# Patient Record
Sex: MALE | Race: WHITE | NOT HISPANIC OR LATINO
[De-identification: names, ages, dates, MRNs, and addresses within clinical notes are randomized per-mention and may not be internally consistent; named-entity substitution may affect disease eponyms.]

---

## 2017-07-06 ENCOUNTER — APPOINTMENT (OUTPATIENT)
Dept: DERMATOLOGY | Facility: CLINIC | Age: 72
End: 2017-07-06

## 2018-04-13 ENCOUNTER — APPOINTMENT (OUTPATIENT)
Dept: ORTHOPEDIC SURGERY | Facility: CLINIC | Age: 73
End: 2018-04-13
Payer: MEDICARE

## 2018-04-13 VITALS — BODY MASS INDEX: 34.07 KG/M2 | HEIGHT: 69 IN | WEIGHT: 230 LBS

## 2018-04-13 DIAGNOSIS — M75.102 UNSPECIFIED ROTATOR CUFF TEAR OR RUPTURE OF LEFT SHOULDER, NOT SPECIFIED AS TRAUMATIC: ICD-10-CM

## 2018-04-13 DIAGNOSIS — M75.101 UNSPECIFIED ROTATOR CUFF TEAR OR RUPTURE OF RIGHT SHOULDER, NOT SPECIFIED AS TRAUMATIC: ICD-10-CM

## 2018-04-13 DIAGNOSIS — M25.511 PAIN IN RIGHT SHOULDER: ICD-10-CM

## 2018-04-13 DIAGNOSIS — S16.1XXS STRAIN OF MUSCLE, FASCIA AND TENDON AT NECK LEVEL, SEQUELA: ICD-10-CM

## 2018-04-13 DIAGNOSIS — M25.512 PAIN IN RIGHT SHOULDER: ICD-10-CM

## 2018-04-13 PROCEDURE — 99202 OFFICE O/P NEW SF 15 MIN: CPT

## 2018-04-16 ENCOUNTER — MOBILE ON CALL (OUTPATIENT)
Age: 73
End: 2018-04-16

## 2018-04-16 PROBLEM — M75.101 RIGHT ROTATOR CUFF TEAR: Status: ACTIVE | Noted: 2018-04-16

## 2018-04-16 PROBLEM — M75.102 LEFT ROTATOR CUFF TEAR: Status: ACTIVE | Noted: 2018-04-16

## 2018-04-16 PROBLEM — S16.1XXS STRAIN OF NECK MUSCLE, SEQUELA: Status: ACTIVE | Noted: 2018-04-16

## 2018-04-16 PROBLEM — M25.511 SHOULDER PAIN, BILATERAL: Status: ACTIVE | Noted: 2018-04-16

## 2018-04-19 ENCOUNTER — APPOINTMENT (OUTPATIENT)
Dept: PHYSICAL MEDICINE AND REHAB | Facility: CLINIC | Age: 73
End: 2018-04-19
Payer: MEDICARE

## 2018-04-19 VITALS — HEART RATE: 74 BPM | HEIGHT: 69 IN | BODY MASS INDEX: 34.07 KG/M2 | WEIGHT: 230 LBS

## 2018-04-19 DIAGNOSIS — M54.2 CERVICALGIA: ICD-10-CM

## 2018-04-19 DIAGNOSIS — Z78.9 OTHER SPECIFIED HEALTH STATUS: ICD-10-CM

## 2018-04-19 PROCEDURE — 20552 NJX 1/MLT TRIGGER POINT 1/2: CPT

## 2018-04-19 PROCEDURE — 99204 OFFICE O/P NEW MOD 45 MIN: CPT | Mod: 25

## 2018-04-20 PROBLEM — Z78.9 CONSUMES ALCOHOL OCCASIONALLY: Status: ACTIVE | Noted: 2018-04-19

## 2018-05-25 ENCOUNTER — INPATIENT (INPATIENT)
Facility: HOSPITAL | Age: 73
LOS: 6 days | Discharge: OTHER INSTITUTION | DRG: 287 | End: 2018-06-01
Attending: HOSPITALIST | Admitting: HOSPITALIST
Payer: MEDICARE

## 2018-05-25 VITALS
TEMPERATURE: 98 F | OXYGEN SATURATION: 92 % | DIASTOLIC BLOOD PRESSURE: 61 MMHG | SYSTOLIC BLOOD PRESSURE: 119 MMHG | HEIGHT: 69 IN | WEIGHT: 229.94 LBS | HEART RATE: 71 BPM | RESPIRATION RATE: 18 BRPM

## 2018-05-25 DIAGNOSIS — M50.20 OTHER CERVICAL DISC DISPLACEMENT, UNSPECIFIED CERVICAL REGION: Chronic | ICD-10-CM

## 2018-05-25 PROCEDURE — 93010 ELECTROCARDIOGRAM REPORT: CPT

## 2018-05-25 PROCEDURE — 99285 EMERGENCY DEPT VISIT HI MDM: CPT

## 2018-05-25 NOTE — ED ADULT NURSE NOTE - OBJECTIVE STATEMENT
Patient presents to ER for medical evaluation, patient reports having colonoscopy this AM, was sent home and during evening hours wife states patient had an episode where patient was unable to speak and not responding to questions, patient currently A&O X3, resp even/unlabored, lungs CTAB.

## 2018-05-25 NOTE — ED PROVIDER NOTE - PMH
Diverticular disease    Hard of hearing, bilateral  wears bilateral hearing aids. not with patient  Hemorrhoids    HTN (hypertension)

## 2018-05-25 NOTE — ED PROVIDER NOTE - OBJECTIVE STATEMENT
74 y/o M with PMHx of HTN, gout and diverticular disease presents to ED c/o syncopal episode onset today while sitting in a chair at home. As per wife, the episode lasted a few minutes, his eyes appeared to roll into the back of is head and she was unable to wake him so she activated EMS. When the patient woke up, he was not aware of what happened and was extremely diaphoretic. Patient had colonoscopy earlier today and went home without any complications. Denies fevers, chills, head trauma, chest pain, difficulty breathing, abdominal pain, nausea, vomiting, headaches or previous cardiac history. No further acute complaints at this time.     Internist: Dr. Pal (who performed colonoscopy earlier today)

## 2018-05-26 DIAGNOSIS — R55 SYNCOPE AND COLLAPSE: ICD-10-CM

## 2018-05-26 DIAGNOSIS — M10.9 GOUT, UNSPECIFIED: ICD-10-CM

## 2018-05-26 DIAGNOSIS — I10 ESSENTIAL (PRIMARY) HYPERTENSION: ICD-10-CM

## 2018-05-26 DIAGNOSIS — Z29.9 ENCOUNTER FOR PROPHYLACTIC MEASURES, UNSPECIFIED: ICD-10-CM

## 2018-05-26 LAB
ALBUMIN SERPL ELPH-MCNC: 3.9 G/DL — SIGNIFICANT CHANGE UP (ref 3.3–5.2)
ALP SERPL-CCNC: 62 U/L — SIGNIFICANT CHANGE UP (ref 40–120)
ALT FLD-CCNC: 19 U/L — SIGNIFICANT CHANGE UP
ANION GAP SERPL CALC-SCNC: 18 MMOL/L — HIGH (ref 5–17)
APTT BLD: 27.8 SEC — SIGNIFICANT CHANGE UP (ref 27.5–37.4)
AST SERPL-CCNC: 24 U/L — SIGNIFICANT CHANGE UP
BASOPHILS # BLD AUTO: 0 K/UL — SIGNIFICANT CHANGE UP (ref 0–0.2)
BASOPHILS NFR BLD AUTO: 0.1 % — SIGNIFICANT CHANGE UP (ref 0–2)
BILIRUB SERPL-MCNC: 0.5 MG/DL — SIGNIFICANT CHANGE UP (ref 0.4–2)
BLD GP AB SCN SERPL QL: SIGNIFICANT CHANGE UP
BUN SERPL-MCNC: 22 MG/DL — HIGH (ref 8–20)
CALCIUM SERPL-MCNC: 9.1 MG/DL — SIGNIFICANT CHANGE UP (ref 8.6–10.2)
CHLORIDE SERPL-SCNC: 99 MMOL/L — SIGNIFICANT CHANGE UP (ref 98–107)
CK MB CFR SERPL CALC: 3.2 NG/ML — SIGNIFICANT CHANGE UP (ref 0–6.7)
CK MB CFR SERPL CALC: 3.5 NG/ML — SIGNIFICANT CHANGE UP (ref 0–6.7)
CK SERPL-CCNC: 221 U/L — HIGH (ref 30–200)
CK SERPL-CCNC: 233 U/L — HIGH (ref 30–200)
CO2 SERPL-SCNC: 23 MMOL/L — SIGNIFICANT CHANGE UP (ref 22–29)
CREAT SERPL-MCNC: 1.17 MG/DL — SIGNIFICANT CHANGE UP (ref 0.5–1.3)
EOSINOPHIL # BLD AUTO: 0 K/UL — SIGNIFICANT CHANGE UP (ref 0–0.5)
EOSINOPHIL NFR BLD AUTO: 0.1 % — SIGNIFICANT CHANGE UP (ref 0–5)
GLUCOSE SERPL-MCNC: 139 MG/DL — HIGH (ref 70–115)
HCT VFR BLD CALC: 46.6 % — SIGNIFICANT CHANGE UP (ref 42–52)
HGB BLD-MCNC: 15.4 G/DL — SIGNIFICANT CHANGE UP (ref 14–18)
INR BLD: 1.06 RATIO — SIGNIFICANT CHANGE UP (ref 0.88–1.16)
LACTATE BLDV-MCNC: 1.9 MMOL/L — SIGNIFICANT CHANGE UP (ref 0.5–2)
LYMPHOCYTES # BLD AUTO: 0.9 K/UL — LOW (ref 1–4.8)
LYMPHOCYTES # BLD AUTO: 10.3 % — LOW (ref 20–55)
MCHC RBC-ENTMCNC: 33 G/DL — SIGNIFICANT CHANGE UP (ref 32–36)
MCHC RBC-ENTMCNC: 33 PG — HIGH (ref 27–31)
MCV RBC AUTO: 99.8 FL — HIGH (ref 80–94)
MONOCYTES # BLD AUTO: 0.4 K/UL — SIGNIFICANT CHANGE UP (ref 0–0.8)
MONOCYTES NFR BLD AUTO: 4.6 % — SIGNIFICANT CHANGE UP (ref 3–10)
NEUTROPHILS # BLD AUTO: 7.7 K/UL — SIGNIFICANT CHANGE UP (ref 1.8–8)
NEUTROPHILS NFR BLD AUTO: 84.7 % — HIGH (ref 37–73)
PLATELET # BLD AUTO: 243 K/UL — SIGNIFICANT CHANGE UP (ref 150–400)
POTASSIUM SERPL-MCNC: 3.9 MMOL/L — SIGNIFICANT CHANGE UP (ref 3.5–5.3)
POTASSIUM SERPL-SCNC: 3.9 MMOL/L — SIGNIFICANT CHANGE UP (ref 3.5–5.3)
PROT SERPL-MCNC: 6.7 G/DL — SIGNIFICANT CHANGE UP (ref 6.6–8.7)
PROTHROM AB SERPL-ACNC: 11.7 SEC — SIGNIFICANT CHANGE UP (ref 9.8–12.7)
RBC # BLD: 4.67 M/UL — SIGNIFICANT CHANGE UP (ref 4.6–6.2)
RBC # FLD: 14.9 % — SIGNIFICANT CHANGE UP (ref 11–15.6)
SODIUM SERPL-SCNC: 140 MMOL/L — SIGNIFICANT CHANGE UP (ref 135–145)
TROPONIN T SERPL-MCNC: <0.01 NG/ML — SIGNIFICANT CHANGE UP (ref 0–0.06)
TROPONIN T SERPL-MCNC: <0.01 NG/ML — SIGNIFICANT CHANGE UP (ref 0–0.06)
TYPE + AB SCN PNL BLD: SIGNIFICANT CHANGE UP
WBC # BLD: 9.1 K/UL — SIGNIFICANT CHANGE UP (ref 4.8–10.8)
WBC # FLD AUTO: 9.1 K/UL — SIGNIFICANT CHANGE UP (ref 4.8–10.8)

## 2018-05-26 PROCEDURE — 71045 X-RAY EXAM CHEST 1 VIEW: CPT | Mod: 26

## 2018-05-26 PROCEDURE — 74177 CT ABD & PELVIS W/CONTRAST: CPT | Mod: 26

## 2018-05-26 PROCEDURE — 93010 ELECTROCARDIOGRAM REPORT: CPT

## 2018-05-26 PROCEDURE — 99223 1ST HOSP IP/OBS HIGH 75: CPT

## 2018-05-26 PROCEDURE — 99223 1ST HOSP IP/OBS HIGH 75: CPT | Mod: AI

## 2018-05-26 RX ORDER — ALLOPURINOL 300 MG
300 TABLET ORAL DAILY
Qty: 0 | Refills: 0 | Status: DISCONTINUED | OUTPATIENT
Start: 2018-05-26 | End: 2018-06-01

## 2018-05-26 RX ORDER — SODIUM CHLORIDE 9 MG/ML
1000 INJECTION, SOLUTION INTRAVENOUS
Qty: 0 | Refills: 0 | Status: DISCONTINUED | OUTPATIENT
Start: 2018-05-26 | End: 2018-05-26

## 2018-05-26 RX ORDER — VALSARTAN 80 MG/1
80 TABLET ORAL DAILY
Qty: 0 | Refills: 0 | Status: DISCONTINUED | OUTPATIENT
Start: 2018-05-26 | End: 2018-05-29

## 2018-05-26 RX ADMIN — Medication 300 MILLIGRAM(S): at 12:08

## 2018-05-26 RX ADMIN — VALSARTAN 80 MILLIGRAM(S): 80 TABLET ORAL at 12:08

## 2018-05-26 RX ADMIN — SODIUM CHLORIDE 100 MILLILITER(S): 9 INJECTION, SOLUTION INTRAVENOUS at 12:08

## 2018-05-26 NOTE — H&P ADULT - HISTORY OF PRESENT ILLNESS
72 yo M with h/o HTN, BPH presents from home with acute onset syncope. Pt reports that he had a colonoscopy on the morning of 5/25 which was uneventful. He returned home and was watching TV when his wife saw that he had slumped backwards in his chair, was unresponsive, and eyes rolled backwards. Wife immediately called EMS, and pt woke up shortly afterwards. Pt denies any palpitations, chest pain, shortness of breath, dizziness, vision changes. Reports that he did not remember the incident at all, and only remembers when he woke up and found himself diaphoretic.    In the ED, CT abdomen/pelvis was negative. EKG showed sinus bradycardia. Troponin neg x 1.

## 2018-05-26 NOTE — H&P ADULT - NEGATIVE NEUROLOGICAL SYMPTOMS
no vertigo/no headache/no confusion/no loss of consciousness/no difficulty walking/no weakness/no generalized seizures/no tremors/no hemiparesis/no facial palsy

## 2018-05-26 NOTE — H&P ADULT - NEUROLOGICAL DETAILS
sensation intact/cranial nerves intact/normal strength/responds to pain/responds to verbal commands/alert and oriented x 3

## 2018-05-26 NOTE — H&P ADULT - RS GEN PE MLT RESP DETAILS PC
good air movement/clear to auscultation bilaterally/no intercostal retractions/respirations non-labored/no chest wall tenderness/no rales

## 2018-05-26 NOTE — PROGRESS NOTE ADULT - SUBJECTIVE AND OBJECTIVE BOX
Pt seen and examined.   Full consult dictated.   One episode of syncope  EKG and tele with frequent PVCs  TTE and CT head  carotid Duplex  Plan to have a stress test.

## 2018-05-26 NOTE — H&P ADULT - PROBLEM SELECTOR PLAN 1
?Cardiogenic etiology  EKG showed sinus samaria  CT head pending  Trend cardiac enzymes  TTE pending  Had recent colonscopy, ? dehydration. No orthostatics done in ED, will start LR @100cc/hr  Cardio consult pending

## 2018-05-27 LAB
ANION GAP SERPL CALC-SCNC: 12 MMOL/L — SIGNIFICANT CHANGE UP (ref 5–17)
BUN SERPL-MCNC: 19 MG/DL — SIGNIFICANT CHANGE UP (ref 8–20)
CALCIUM SERPL-MCNC: 8.9 MG/DL — SIGNIFICANT CHANGE UP (ref 8.6–10.2)
CHLORIDE SERPL-SCNC: 103 MMOL/L — SIGNIFICANT CHANGE UP (ref 98–107)
CO2 SERPL-SCNC: 27 MMOL/L — SIGNIFICANT CHANGE UP (ref 22–29)
CREAT SERPL-MCNC: 1.17 MG/DL — SIGNIFICANT CHANGE UP (ref 0.5–1.3)
GLUCOSE SERPL-MCNC: 91 MG/DL — SIGNIFICANT CHANGE UP (ref 70–115)
HCT VFR BLD CALC: 44.8 % — SIGNIFICANT CHANGE UP (ref 42–52)
HGB BLD-MCNC: 14.5 G/DL — SIGNIFICANT CHANGE UP (ref 14–18)
MAGNESIUM SERPL-MCNC: 2.1 MG/DL — SIGNIFICANT CHANGE UP (ref 1.6–2.6)
MCHC RBC-ENTMCNC: 32.4 G/DL — SIGNIFICANT CHANGE UP (ref 32–36)
MCHC RBC-ENTMCNC: 32.4 PG — HIGH (ref 27–31)
MCV RBC AUTO: 100.2 FL — HIGH (ref 80–94)
PLATELET # BLD AUTO: 220 K/UL — SIGNIFICANT CHANGE UP (ref 150–400)
POTASSIUM SERPL-MCNC: 4.2 MMOL/L — SIGNIFICANT CHANGE UP (ref 3.5–5.3)
POTASSIUM SERPL-SCNC: 4.2 MMOL/L — SIGNIFICANT CHANGE UP (ref 3.5–5.3)
RBC # BLD: 4.47 M/UL — LOW (ref 4.6–6.2)
RBC # FLD: 14.7 % — SIGNIFICANT CHANGE UP (ref 11–15.6)
SODIUM SERPL-SCNC: 142 MMOL/L — SIGNIFICANT CHANGE UP (ref 135–145)
WBC # BLD: 5.9 K/UL — SIGNIFICANT CHANGE UP (ref 4.8–10.8)
WBC # FLD AUTO: 5.9 K/UL — SIGNIFICANT CHANGE UP (ref 4.8–10.8)

## 2018-05-27 PROCEDURE — 93970 EXTREMITY STUDY: CPT | Mod: 26

## 2018-05-27 PROCEDURE — 99233 SBSQ HOSP IP/OBS HIGH 50: CPT

## 2018-05-27 PROCEDURE — 70450 CT HEAD/BRAIN W/O DYE: CPT | Mod: 26

## 2018-05-27 PROCEDURE — 93306 TTE W/DOPPLER COMPLETE: CPT | Mod: 26

## 2018-05-27 PROCEDURE — 93880 EXTRACRANIAL BILAT STUDY: CPT | Mod: 26

## 2018-05-27 PROCEDURE — 93010 ELECTROCARDIOGRAM REPORT: CPT

## 2018-05-27 PROCEDURE — 99232 SBSQ HOSP IP/OBS MODERATE 35: CPT

## 2018-05-27 RX ORDER — ENOXAPARIN SODIUM 100 MG/ML
40 INJECTION SUBCUTANEOUS DAILY
Qty: 0 | Refills: 0 | Status: DISCONTINUED | OUTPATIENT
Start: 2018-05-27 | End: 2018-05-28

## 2018-05-27 RX ORDER — ATENOLOL 25 MG/1
12.5 TABLET ORAL DAILY
Qty: 0 | Refills: 0 | Status: DISCONTINUED | OUTPATIENT
Start: 2018-05-27 | End: 2018-06-01

## 2018-05-27 RX ADMIN — ENOXAPARIN SODIUM 40 MILLIGRAM(S): 100 INJECTION SUBCUTANEOUS at 21:44

## 2018-05-27 RX ADMIN — VALSARTAN 80 MILLIGRAM(S): 80 TABLET ORAL at 05:12

## 2018-05-27 RX ADMIN — ATENOLOL 12.5 MILLIGRAM(S): 25 TABLET ORAL at 17:31

## 2018-05-27 RX ADMIN — Medication 300 MILLIGRAM(S): at 11:56

## 2018-05-27 NOTE — PROGRESS NOTE ADULT - SUBJECTIVE AND OBJECTIVE BOX
CHIEF COMPLAINT:Patient is a 73y old  Male who presents with a chief complaint of Syncope (26 May 2018 09:05)  pt is walking today. no more syncope or presyncope.  PVCs on monitor.   no n/v/d    Allergies  No Known Allergies    MEDICATIONS:  valsartan 80 milliGRAM(s) Oral daily  allopurinol 300 milliGRAM(s) Oral daily    PHYSICAL EXAM:  T(C): 36.1 (05-27-18 @ 05:10), Max: 36.7 (05-26-18 @ 21:00)  HR: 74 (05-27-18 @ 13:06) (68 - 77)  BP: 118/87 (05-27-18 @ 13:06) (98/70 - 125/75)  RR: 18 (05-27-18 @ 13:06) (18 - 20)  SpO2: 97% (05-27-18 @ 10:10) (95% - 97%)  Wt(kg): --    I&O's Summary    26 May 2018 07:01  -  27 May 2018 07:00  --------------------------------------------------------  IN: 120 mL / OUT: 0 mL / NET: 120 mL    Appearance: Normal	  HEENT:   NC/AT  Eye: Pink Conjunctiva  Lungs: CTA B/L  CVS: RRR, Normal S1 and S2, No Edema  Pulses: Normal distal pulses.  Neuro: A&O x3      LABS:		    CARDIAC MARKERS:                            14.5   5.9   )-----------( 220      ( 27 May 2018 05:35 )             44.8     05-27    142  |  103  |  19.0  ----------------------------<  91  4.2   |  27.0  |  1.17    Ca    8.9      27 May 2018 05:35  Mg     2.1     05-27    TPro  6.7  /  Alb  3.9  /  TBili  0.5  /  DBili  x   /  AST  24  /  ALT  19  /  AlkPhos  62  05-26    TTE:  Summary:   1. Technically difficult study.   2. Endocardial visualization was enhanced with intravenous echo contrast.   3. There is moderate concentric left ventricular hypertrophy.   4. Normal global left ventricular systolic function.   5. Left ventricular ejection fraction, by visual estimation, is 60 to   65%.   6. Normal left ventricular internal cavity size.   7. Spectral Doppler shows impaired relaxation pattern of left   ventricular myocardial filling (Grade I diastolic dysfunction).   8. There is no evidence of pericardial effusion.   9. Thickening of the anterior mitral valve leaflet.  10. Dilatation of the aortic root.  11. The aortic valve mean gradient is 3.0 mmHg consistent with normally   opening aortic valve.  12. Trace tricuspid regurgitation.    [id number] Margarito Harper MD, Electronically signed on 5/27/2018 at 2:53:37 PM       ASSESSMENT/PLAN:

## 2018-05-27 NOTE — PROGRESS NOTE ADULT - SUBJECTIVE AND OBJECTIVE BOX
DONTA CRABTREE    [id number]    73y      Male    INTERVAL HPI/OVERNIGHT EVENTS: Multiple PVCs on monitor. Offers no complaints.    Hospital course:  74 yo M with h/o HTN, BPH presents from home with acute onset syncope. Pt reports that he had a colonoscopy on the morning of 5/25 which was uneventful. He returned home and was watching TV when his wife saw that he had slumped backwards in his chair, was unresponsive, and eyes rolled backwards. Wife immediately called EMS, and pt woke up shortly afterwards. Pt denies any palpitations, chest pain, shortness of breath, dizziness, vision changes. Reports that he did not remember the incident at all, and only remembers when he woke up and found himself diaphoretic. In the ED, CT abdomen/pelvis was negative. EKG showed sinus bradycardia. Troponin neg x 2. CT head negative.       REVIEW OF SYSTEMS:    CONSTITUTIONAL: No fever   RESPIRATORY: No cough   CARDIOVASCULAR: No chest pain       Vital Signs Last 24 Hrs  T(C): 36.1 (27 May 2018 05:10), Max: 36.7 (26 May 2018 21:00)  T(F): 97 (27 May 2018 05:10), Max: 98 (26 May 2018 21:00)  HR: 75 (27 May 2018 10:10) (68 - 77)  BP: 125/75 (27 May 2018 10:10) (98/70 - 125/75)  BP(mean): --  RR: 18 (27 May 2018 10:10) (18 - 20)  SpO2: 97% (27 May 2018 10:10) (95% - 97%)    PHYSICAL EXAM:    GENERAL: NAD, well-groomed  HEENT: PERRL, +EOMI, MMM  CHEST/LUNG: Clear to percussion bilaterally; No wheezing  HEART: S1S2+, Regular rate and rhythm   ABDOMEN: Soft, Nontender, Nondistended; Bowel sounds present  EXTREMITIES:  no edema    LABS:                        14.5   5.9   )-----------( 220      ( 27 May 2018 05:35 )             44.8     05-27    142  |  103  |  19.0  ----------------------------<  91  4.2   |  27.0  |  1.17    Ca    8.9      27 May 2018 05:35  Mg     2.1     05-27    TPro  6.7  /  Alb  3.9  /  TBili  0.5  /  DBili  x   /  AST  24  /  ALT  19  /  AlkPhos  62  05-26    PT/INR - ( 26 May 2018 00:11 )   PT: 11.7 sec;   INR: 1.06 ratio         PTT - ( 26 May 2018 00:11 )  PTT:27.8 sec        MEDICATIONS  (STANDING):  allopurinol 300 milliGRAM(s) Oral daily  valsartan 80 milliGRAM(s) Oral daily    MEDICATIONS  (PRN):      RADIOLOGY & ADDITIONAL TESTS:

## 2018-05-28 DIAGNOSIS — K62.5 HEMORRHAGE OF ANUS AND RECTUM: ICD-10-CM

## 2018-05-28 LAB
ANION GAP SERPL CALC-SCNC: 13 MMOL/L — SIGNIFICANT CHANGE UP (ref 5–17)
BUN SERPL-MCNC: 20 MG/DL — SIGNIFICANT CHANGE UP (ref 8–20)
CALCIUM SERPL-MCNC: 9.5 MG/DL — SIGNIFICANT CHANGE UP (ref 8.6–10.2)
CHLORIDE SERPL-SCNC: 101 MMOL/L — SIGNIFICANT CHANGE UP (ref 98–107)
CO2 SERPL-SCNC: 25 MMOL/L — SIGNIFICANT CHANGE UP (ref 22–29)
CREAT SERPL-MCNC: 1.16 MG/DL — SIGNIFICANT CHANGE UP (ref 0.5–1.3)
GLUCOSE SERPL-MCNC: 107 MG/DL — SIGNIFICANT CHANGE UP (ref 70–115)
HCT VFR BLD CALC: 48 % — SIGNIFICANT CHANGE UP (ref 42–52)
HGB BLD-MCNC: 15.8 G/DL — SIGNIFICANT CHANGE UP (ref 14–18)
MAGNESIUM SERPL-MCNC: 2 MG/DL — SIGNIFICANT CHANGE UP (ref 1.6–2.6)
MCHC RBC-ENTMCNC: 32.9 G/DL — SIGNIFICANT CHANGE UP (ref 32–36)
MCHC RBC-ENTMCNC: 33.1 PG — HIGH (ref 27–31)
MCV RBC AUTO: 100.4 FL — HIGH (ref 80–94)
PLATELET # BLD AUTO: 232 K/UL — SIGNIFICANT CHANGE UP (ref 150–400)
POTASSIUM SERPL-MCNC: 4.6 MMOL/L — SIGNIFICANT CHANGE UP (ref 3.5–5.3)
POTASSIUM SERPL-SCNC: 4.6 MMOL/L — SIGNIFICANT CHANGE UP (ref 3.5–5.3)
RBC # BLD: 4.78 M/UL — SIGNIFICANT CHANGE UP (ref 4.6–6.2)
RBC # FLD: 14.7 % — SIGNIFICANT CHANGE UP (ref 11–15.6)
SODIUM SERPL-SCNC: 139 MMOL/L — SIGNIFICANT CHANGE UP (ref 135–145)
WBC # BLD: 7.2 K/UL — SIGNIFICANT CHANGE UP (ref 4.8–10.8)
WBC # FLD AUTO: 7.2 K/UL — SIGNIFICANT CHANGE UP (ref 4.8–10.8)

## 2018-05-28 PROCEDURE — 99232 SBSQ HOSP IP/OBS MODERATE 35: CPT

## 2018-05-28 PROCEDURE — 99233 SBSQ HOSP IP/OBS HIGH 50: CPT

## 2018-05-28 RX ORDER — DOCUSATE SODIUM 100 MG
100 CAPSULE ORAL
Qty: 0 | Refills: 0 | Status: DISCONTINUED | OUTPATIENT
Start: 2018-05-28 | End: 2018-06-01

## 2018-05-28 RX ORDER — SENNA PLUS 8.6 MG/1
2 TABLET ORAL AT BEDTIME
Qty: 0 | Refills: 0 | Status: DISCONTINUED | OUTPATIENT
Start: 2018-05-28 | End: 2018-06-01

## 2018-05-28 RX ORDER — SIMETHICONE 80 MG/1
80 TABLET, CHEWABLE ORAL ONCE
Qty: 0 | Refills: 0 | Status: COMPLETED | OUTPATIENT
Start: 2018-05-28 | End: 2018-05-28

## 2018-05-28 RX ADMIN — SIMETHICONE 80 MILLIGRAM(S): 80 TABLET, CHEWABLE ORAL at 23:03

## 2018-05-28 RX ADMIN — ATENOLOL 12.5 MILLIGRAM(S): 25 TABLET ORAL at 05:02

## 2018-05-28 RX ADMIN — Medication 300 MILLIGRAM(S): at 11:24

## 2018-05-28 RX ADMIN — Medication 100 MILLIGRAM(S): at 05:02

## 2018-05-28 RX ADMIN — VALSARTAN 80 MILLIGRAM(S): 80 TABLET ORAL at 05:02

## 2018-05-28 NOTE — PROGRESS NOTE ADULT - SUBJECTIVE AND OBJECTIVE BOX
DONTA CRABTREE    [id number]    73y      Male    INTERVAL HPI/OVERNIGHT EVENTS: Had episode of rectal bleeding this morning that has since stopped. Denies any lightheadedness, palpitations. Reports that this happens occasionally. Lovenox d/c'd.    Hospital course:  72 yo M with h/o HTN, BPH presents from home with acute onset syncope. Pt reports that he had a colonoscopy on the morning of 5/25 which was uneventful. He returned home and was watching TV when his wife saw that he had slumped backwards in his chair, was unresponsive, and eyes rolled backwards. Wife immediately called EMS, and pt woke up shortly afterwards. Pt denies any palpitations, chest pain, shortness of breath, dizziness, vision changes. Reports that he did not remember the incident at all, and only remembers when he woke up and found himself diaphoretic. In the ED, CT abdomen/pelvis was negative. EKG showed sinus bradycardia. Troponin neg x 2. CT head negative. Carotid US negative. B/l LE US negative for DVT. TTE showed EF 60-65% and grade 1 diastolic dysfunction. Awaiting stress test.       REVIEW OF SYSTEMS:    CONSTITUTIONAL: No fever   RESPIRATORY: No cough   CARDIOVASCULAR: No chest pain     Vital Signs Last 24 Hrs  T(C): 36.2 (28 May 2018 05:01), Max: 36.8 (27 May 2018 21:43)  T(F): 97.1 (28 May 2018 05:01), Max: 98.3 (27 May 2018 21:43)  HR: 63 (28 May 2018 05:01) (50 - 74)  BP: 133/83 (28 May 2018 05:01) (114/71 - 133/83)  BP(mean): --  RR: 18 (28 May 2018 05:01) (18 - 18)  SpO2: 96% (28 May 2018 05:01) (93% - 96%)    PHYSICAL EXAM:    GENERAL: NAD   HEENT: PERRL, +EOMI, MMM  CHEST/LUNG: Clear to percussion bilaterally; No wheezing  HEART: S1S2+, Regular rate and rhythm  ABDOMEN: Soft, Nontender, Nondistended; Bowel sounds present  EXTREMITIES: no edema      LABS:                        15.8   7.2   )-----------( 232      ( 28 May 2018 05:28 )             48.0     05-28    139  |  101  |  20.0  ----------------------------<  107  4.6   |  25.0  |  1.16    Ca    9.5      28 May 2018 05:28  Mg     2.0     05-28              MEDICATIONS  (STANDING):  allopurinol 300 milliGRAM(s) Oral daily  ATENolol  Tablet 12.5 milliGRAM(s) Oral daily  docusate sodium 100 milliGRAM(s) Oral two times a day  senna 2 Tablet(s) Oral at bedtime  valsartan 80 milliGRAM(s) Oral daily    MEDICATIONS  (PRN):      RADIOLOGY & ADDITIONAL TESTS:

## 2018-05-28 NOTE — CHART NOTE - NSCHARTNOTEFT_GEN_A_CORE
Called by RN to report rectal bleeding. Pt seen and assessed, states he felt some left-side abdominal cramps, went to the bathroom and had a bloody bowel movement. Pt has a hx of hemorrhoids and diverticulosis, reports she was straining during BM. Bowel sounds hypoactive, abdomen distended but nontender to palpation. BP stable. Pt recently had colonoscopy and CT abdomen (unremarkable). Pt was admitted for rectal bleed and diverticulitis back in 2015 and was discharged on Flagyl/Cipro.  A/P:  -rectal bleed 2/2 hemorrhoids vs diverticular disease  -hold Lovenox  -f/u CBC  -add stool softeners  -consider GI consult

## 2018-05-28 NOTE — PROGRESS NOTE ADULT - SUBJECTIVE AND OBJECTIVE BOX
CHIEF COMPLAINT:Patient is a 73y old  Male who presents with a chief complaint of Syncope (26 May 2018 09:05)  no more syncope or presyncope  GIB, lower with BRBPR, possibly from diverticular bleed, hx of such in the past.  No cp or sob  No n/v.    Allergies  No Known Allergies    	  MEDICATIONS:  ATENolol  Tablet 12.5 milliGRAM(s) Oral daily  valsartan 80 milliGRAM(s) Oral daily  docusate sodium 100 milliGRAM(s) Oral two times a day  senna 2 Tablet(s) Oral at bedtime  allopurinol 300 milliGRAM(s) Oral daily    PHYSICAL EXAM:  T(C): 36.2 (05-28-18 @ 05:01), Max: 36.8 (05-27-18 @ 21:43)  HR: 63 (05-28-18 @ 05:01) (50 - 74)  BP: 133/83 (05-28-18 @ 05:01) (114/71 - 133/83)  RR: 18 (05-28-18 @ 05:01) (18 - 18)  SpO2: 96% (05-28-18 @ 05:01) (93% - 96%)  Wt(kg): --    I&O's Summary    27 May 2018 07:01  -  28 May 2018 07:00  --------------------------------------------------------  IN: 240 mL / OUT: 0 mL / NET: 240 mL    Appearance: Normal	  HEENT:   NC/AT  Eye: Pink Conjunctiva  Lungs: CTA B/L  CVS: RRR, Normal S1 and S2, No Edema  Pulses: Normal distal pulses.  Neuro: A&O x3    TELEMETRY: PVC, couplets     LABS:	 	    CARDIAC MARKERS:                            15.8   7.2   )-----------( 232      ( 28 May 2018 05:28 )             48.0     05-28    139  |  101  |  20.0  ----------------------------<  107  4.6   |  25.0  |  1.16    Ca    9.5      28 May 2018 05:28  Mg     2.0     05-28      proBNP:   Lipid Profile:   HgA1c:   TSH:     ASSESSMENT/PLAN:

## 2018-05-29 LAB
ANION GAP SERPL CALC-SCNC: 16 MMOL/L — SIGNIFICANT CHANGE UP (ref 5–17)
BUN SERPL-MCNC: 21 MG/DL — HIGH (ref 8–20)
CALCIUM SERPL-MCNC: 9.2 MG/DL — SIGNIFICANT CHANGE UP (ref 8.6–10.2)
CHLORIDE SERPL-SCNC: 100 MMOL/L — SIGNIFICANT CHANGE UP (ref 98–107)
CO2 SERPL-SCNC: 23 MMOL/L — SIGNIFICANT CHANGE UP (ref 22–29)
CREAT SERPL-MCNC: 1.22 MG/DL — SIGNIFICANT CHANGE UP (ref 0.5–1.3)
GLUCOSE SERPL-MCNC: 101 MG/DL — SIGNIFICANT CHANGE UP (ref 70–115)
HCT VFR BLD CALC: 45.8 % — SIGNIFICANT CHANGE UP (ref 42–52)
HGB BLD-MCNC: 15.1 G/DL — SIGNIFICANT CHANGE UP (ref 14–18)
MAGNESIUM SERPL-MCNC: 1.7 MG/DL — SIGNIFICANT CHANGE UP (ref 1.6–2.6)
MCHC RBC-ENTMCNC: 33 G/DL — SIGNIFICANT CHANGE UP (ref 32–36)
MCHC RBC-ENTMCNC: 33.3 PG — HIGH (ref 27–31)
MCV RBC AUTO: 100.9 FL — HIGH (ref 80–94)
PLATELET # BLD AUTO: 233 K/UL — SIGNIFICANT CHANGE UP (ref 150–400)
POTASSIUM SERPL-MCNC: 4 MMOL/L — SIGNIFICANT CHANGE UP (ref 3.5–5.3)
POTASSIUM SERPL-SCNC: 4 MMOL/L — SIGNIFICANT CHANGE UP (ref 3.5–5.3)
RBC # BLD: 4.54 M/UL — LOW (ref 4.6–6.2)
RBC # FLD: 14.6 % — SIGNIFICANT CHANGE UP (ref 11–15.6)
SODIUM SERPL-SCNC: 139 MMOL/L — SIGNIFICANT CHANGE UP (ref 135–145)
WBC # BLD: 14.4 K/UL — HIGH (ref 4.8–10.8)
WBC # FLD AUTO: 14.4 K/UL — HIGH (ref 4.8–10.8)

## 2018-05-29 PROCEDURE — 99152 MOD SED SAME PHYS/QHP 5/>YRS: CPT

## 2018-05-29 PROCEDURE — 93016 CV STRESS TEST SUPVJ ONLY: CPT

## 2018-05-29 PROCEDURE — 99233 SBSQ HOSP IP/OBS HIGH 50: CPT

## 2018-05-29 PROCEDURE — 99232 SBSQ HOSP IP/OBS MODERATE 35: CPT

## 2018-05-29 PROCEDURE — 93018 CV STRESS TEST I&R ONLY: CPT

## 2018-05-29 PROCEDURE — 78452 HT MUSCLE IMAGE SPECT MULT: CPT | Mod: 26

## 2018-05-29 PROCEDURE — 93458 L HRT ARTERY/VENTRICLE ANGIO: CPT | Mod: 26

## 2018-05-29 RX ORDER — MAGNESIUM SULFATE 500 MG/ML
1 VIAL (ML) INJECTION ONCE
Qty: 0 | Refills: 0 | Status: COMPLETED | OUTPATIENT
Start: 2018-05-29 | End: 2018-05-29

## 2018-05-29 RX ORDER — ASPIRIN/CALCIUM CARB/MAGNESIUM 324 MG
81 TABLET ORAL DAILY
Qty: 0 | Refills: 0 | Status: DISCONTINUED | OUTPATIENT
Start: 2018-05-29 | End: 2018-05-30

## 2018-05-29 RX ORDER — SODIUM CHLORIDE 9 MG/ML
1000 INJECTION INTRAMUSCULAR; INTRAVENOUS; SUBCUTANEOUS
Qty: 0 | Refills: 0 | Status: DISCONTINUED | OUTPATIENT
Start: 2018-05-29 | End: 2018-06-01

## 2018-05-29 RX ORDER — SODIUM CHLORIDE 9 MG/ML
500 INJECTION INTRAMUSCULAR; INTRAVENOUS; SUBCUTANEOUS ONCE
Qty: 0 | Refills: 0 | Status: COMPLETED | OUTPATIENT
Start: 2018-05-29 | End: 2018-05-29

## 2018-05-29 RX ORDER — VALSARTAN 80 MG/1
40 TABLET ORAL DAILY
Qty: 0 | Refills: 0 | Status: DISCONTINUED | OUTPATIENT
Start: 2018-05-30 | End: 2018-06-01

## 2018-05-29 RX ORDER — CLOPIDOGREL BISULFATE 75 MG/1
600 TABLET, FILM COATED ORAL ONCE
Qty: 0 | Refills: 0 | Status: COMPLETED | OUTPATIENT
Start: 2018-05-29 | End: 2018-05-29

## 2018-05-29 RX ORDER — METOPROLOL TARTRATE 50 MG
2.5 TABLET ORAL ONCE
Qty: 0 | Refills: 0 | Status: COMPLETED | OUTPATIENT
Start: 2018-05-29 | End: 2018-05-29

## 2018-05-29 RX ORDER — CLOPIDOGREL BISULFATE 75 MG/1
75 TABLET, FILM COATED ORAL DAILY
Qty: 0 | Refills: 0 | Status: DISCONTINUED | OUTPATIENT
Start: 2018-05-30 | End: 2018-05-30

## 2018-05-29 RX ADMIN — SODIUM CHLORIDE 1000 MILLILITER(S): 9 INJECTION INTRAMUSCULAR; INTRAVENOUS; SUBCUTANEOUS at 06:16

## 2018-05-29 RX ADMIN — SODIUM CHLORIDE 50 MILLILITER(S): 9 INJECTION INTRAMUSCULAR; INTRAVENOUS; SUBCUTANEOUS at 22:04

## 2018-05-29 RX ADMIN — Medication 81 MILLIGRAM(S): at 14:31

## 2018-05-29 RX ADMIN — Medication 100 GRAM(S): at 15:36

## 2018-05-29 RX ADMIN — Medication 2.5 MILLIGRAM(S): at 02:32

## 2018-05-29 RX ADMIN — Medication 300 MILLIGRAM(S): at 12:49

## 2018-05-29 RX ADMIN — CLOPIDOGREL BISULFATE 600 MILLIGRAM(S): 75 TABLET, FILM COATED ORAL at 15:55

## 2018-05-29 RX ADMIN — ATENOLOL 12.5 MILLIGRAM(S): 25 TABLET ORAL at 06:16

## 2018-05-29 NOTE — PROGRESS NOTE ADULT - SUBJECTIVE AND OBJECTIVE BOX
DONTA CRABTREE    [id number]    73y      Male    INTERVAL HPI/OVERNIGHT EVENTS: Offers no complaints. S/p stress test.    Hospital course:  74 yo M with h/o HTN, BPH presents from home with acute onset syncope. Pt reports that he had a colonoscopy on the morning of 5/25 which was uneventful. He returned home and was watching TV when his wife saw that he had slumped backwards in his chair, was unresponsive, and eyes rolled backwards. Wife immediately called EMS, and pt woke up shortly afterwards. Pt denies any palpitations, chest pain, shortness of breath, dizziness, vision changes. Reports that he did not remember the incident at all, and only remembers when he woke up and found himself diaphoretic. In the ED, CT abdomen/pelvis was negative. EKG showed sinus bradycardia. Troponin neg x 2. CT head negative. Carotid US negative. B/l LE US negative for DVT. TTE showed EF 60-65% and grade 1 diastolic dysfunction. Awaiting stress test.     REVIEW OF SYSTEMS:    CONSTITUTIONAL: No fever  RESPIRATORY: No cough  CARDIOVASCULAR: No chest pain     Vital Signs Last 24 Hrs  T(C): 36.9 (29 May 2018 10:25), Max: 37.2 (28 May 2018 15:44)  T(F): 98.4 (29 May 2018 10:25), Max: 98.9 (28 May 2018 15:44)  HR: 112 (29 May 2018 05:47) (73 - 125)  BP: 100/58 (29 May 2018 07:15) (94/69 - 129/80)  BP(mean): --  RR: 17 (29 May 2018 05:47) (17 - 18)  SpO2: --    PHYSICAL EXAM:    GENERAL: NAD  HEENT: PERRL, +EOMI  NECK: soft, Supple   CHEST/LUNG: Clear to percussion bilaterally; No wheezing  HEART: S1S2+, Regular rate and rhythm   ABDOMEN: Soft, Nontender, Nondistended; Bowel sounds present  EXTREMITIES: no edema    LABS:                        15.1   14.4  )-----------( 233      ( 29 May 2018 06:04 )             45.8     05-29    139  |  100  |  21.0<H>  ----------------------------<  101  4.0   |  23.0  |  1.22    Ca    9.2      29 May 2018 06:04  Mg     1.7     05-29              MEDICATIONS  (STANDING):  allopurinol 300 milliGRAM(s) Oral daily  ATENolol  Tablet 12.5 milliGRAM(s) Oral daily  docusate sodium 100 milliGRAM(s) Oral two times a day  senna 2 Tablet(s) Oral at bedtime  valsartan 80 milliGRAM(s) Oral daily    MEDICATIONS  (PRN):      RADIOLOGY & ADDITIONAL TESTS:

## 2018-05-29 NOTE — PROGRESS NOTE ADULT - SUBJECTIVE AND OBJECTIVE BOX
Dr. Cai PCP  Dr. Pal GI  Syncope after colonscopy  Troponins negative   WBC elevated today  ASA2/Mallampati3  NPO >12 hours Dr. Cai PCP  Dr. Pal GI  Syncope after colonscopy  Troponins negative   WBC elevated today  ASA2/Mallampati3 Has BEV but does not wear his CPAP  NPO >12 hours

## 2018-05-29 NOTE — PROGRESS NOTE ADULT - SUBJECTIVE AND OBJECTIVE BOX
CHIEF COMPLAINT: Patient is a 73y old  Male who is seen with syncope, pvcs.  Feels well, no cp,  had nuclear stress test  no n/v/ syncope or presyncope.     Allergies  No Known Allergies    	  MEDICATIONS:  ATENolol  Tablet 12.5 milliGRAM(s) Oral daily  docusate sodium 100 milliGRAM(s) Oral two times a day  senna 2 Tablet(s) Oral at bedtime  allopurinol 300 milliGRAM(s) Oral daily    PHYSICAL EXAM:  T(C): 36.9 (05-29-18 @ 10:25), Max: 37.2 (05-28-18 @ 15:44)  HR: 112 (05-29-18 @ 05:47) (73 - 125)  BP: 100/58 (05-29-18 @ 07:15) (94/69 - 129/80)  RR: 17 (05-29-18 @ 05:47) (17 - 18)  I&O's Summary    28 May 2018 07:01  -  29 May 2018 07:00  --------------------------------------------------------  IN: 560 mL / OUT: 0 mL / NET: 560 mL    29 May 2018 07:01  -  29 May 2018 12:17  --------------------------------------------------------  IN: 240 mL / OUT: 0 mL / NET: 240 mL  Appearance: Normal	  HEENT:   NC/AT  Eye: Pink Conjunctiva  Lungs: CTA B/L  CVS: RRR, Normal S1 and S2, No Edema  Pulses: Normal distal pulses.  Neuro: A&O x3    TELEMETRY: PVCs, couplets     LABS:	 	                          15.1   14.4  )-----------( 233      ( 29 May 2018 06:04 )             45.8     05-29    139  |  100  |  21.0<H>  ----------------------------<  101  4.0   |  23.0  |  1.22    Ca    9.2      29 May 2018 06:04  Mg     1.7     05-29      proBNP:   Lipid Profile:   HgA1c:   TSH:     ASSESSMENT/PLAN:

## 2018-05-29 NOTE — PROGRESS NOTE ADULT - SUBJECTIVE AND OBJECTIVE BOX
S/P cath  mid LAD 80%  mid to distal LAD 90%  LCX 20-40%  RCA 30-40%  Official report pending  Plavix load today then 75 mg daily  If patient tolerates plavix PCI of LAD on Friday morning S/P cath  mid LAD 80%  mid to distal LAD 90%  LCX 20-40%  RCA 30-40%  Official report pending  Plavix load today then 75 mg daily  If patient tolerates plavix PCI of LAD on Friday morning  NPO after midnight on Thursday  Will monitor. S/P cath  mid LAD 80%  mid to distal LAD 90%  LCX 20-40%  RCA 30-40%  Right radial site benign  Remove radial band at 1630  Official report pending  Plavix load today then 75 mg daily  If patient tolerates plavix PCI of LAD on Friday morning  NPO after midnight on Thursday  Will monitor.

## 2018-05-30 DIAGNOSIS — I25.10 ATHEROSCLEROTIC HEART DISEASE OF NATIVE CORONARY ARTERY WITHOUT ANGINA PECTORIS: ICD-10-CM

## 2018-05-30 DIAGNOSIS — R50.9 FEVER, UNSPECIFIED: ICD-10-CM

## 2018-05-30 DIAGNOSIS — I49.3 VENTRICULAR PREMATURE DEPOLARIZATION: ICD-10-CM

## 2018-05-30 LAB
ANION GAP SERPL CALC-SCNC: 12 MMOL/L — SIGNIFICANT CHANGE UP (ref 5–17)
APPEARANCE UR: CLEAR — SIGNIFICANT CHANGE UP
BILIRUB UR-MCNC: NEGATIVE — SIGNIFICANT CHANGE UP
BLD GP AB SCN SERPL QL: SIGNIFICANT CHANGE UP
BUN SERPL-MCNC: 21 MG/DL — HIGH (ref 8–20)
CALCIUM SERPL-MCNC: 8.6 MG/DL — SIGNIFICANT CHANGE UP (ref 8.6–10.2)
CHLORIDE SERPL-SCNC: 100 MMOL/L — SIGNIFICANT CHANGE UP (ref 98–107)
CO2 SERPL-SCNC: 24 MMOL/L — SIGNIFICANT CHANGE UP (ref 22–29)
COLOR SPEC: YELLOW — SIGNIFICANT CHANGE UP
CREAT SERPL-MCNC: 1.27 MG/DL — SIGNIFICANT CHANGE UP (ref 0.5–1.3)
DIFF PNL FLD: ABNORMAL
EPI CELLS # UR: SIGNIFICANT CHANGE UP
GLUCOSE SERPL-MCNC: 92 MG/DL — SIGNIFICANT CHANGE UP (ref 70–115)
GLUCOSE UR QL: NEGATIVE MG/DL — SIGNIFICANT CHANGE UP
HCT VFR BLD CALC: 42.7 % — SIGNIFICANT CHANGE UP (ref 42–52)
HCT VFR BLD CALC: 44.4 % — SIGNIFICANT CHANGE UP (ref 42–52)
HGB BLD-MCNC: 13.8 G/DL — LOW (ref 14–18)
HGB BLD-MCNC: 14.6 G/DL — SIGNIFICANT CHANGE UP (ref 14–18)
KETONES UR-MCNC: NEGATIVE — SIGNIFICANT CHANGE UP
LEUKOCYTE ESTERASE UR-ACNC: ABNORMAL
MAGNESIUM SERPL-MCNC: 2.2 MG/DL — SIGNIFICANT CHANGE UP (ref 1.6–2.6)
MCHC RBC-ENTMCNC: 32.3 G/DL — SIGNIFICANT CHANGE UP (ref 32–36)
MCHC RBC-ENTMCNC: 32.5 PG — HIGH (ref 27–31)
MCHC RBC-ENTMCNC: 32.7 PG — HIGH (ref 27–31)
MCHC RBC-ENTMCNC: 32.9 G/DL — SIGNIFICANT CHANGE UP (ref 32–36)
MCV RBC AUTO: 100.5 FL — HIGH (ref 80–94)
MCV RBC AUTO: 99.6 FL — HIGH (ref 80–94)
NITRITE UR-MCNC: NEGATIVE — SIGNIFICANT CHANGE UP
PH UR: 6.5 — SIGNIFICANT CHANGE UP (ref 5–8)
PLATELET # BLD AUTO: 162 K/UL — SIGNIFICANT CHANGE UP (ref 150–400)
PLATELET # BLD AUTO: 173 K/UL — SIGNIFICANT CHANGE UP (ref 150–400)
POTASSIUM SERPL-MCNC: 4.3 MMOL/L — SIGNIFICANT CHANGE UP (ref 3.5–5.3)
POTASSIUM SERPL-SCNC: 4.3 MMOL/L — SIGNIFICANT CHANGE UP (ref 3.5–5.3)
PROT UR-MCNC: 30 MG/DL
RBC # BLD: 4.25 M/UL — LOW (ref 4.6–6.2)
RBC # BLD: 4.46 M/UL — LOW (ref 4.6–6.2)
RBC # FLD: 14.7 % — SIGNIFICANT CHANGE UP (ref 11–15.6)
RBC # FLD: 15 % — SIGNIFICANT CHANGE UP (ref 11–15.6)
RBC CASTS # UR COMP ASSIST: ABNORMAL /HPF (ref 0–4)
SODIUM SERPL-SCNC: 136 MMOL/L — SIGNIFICANT CHANGE UP (ref 135–145)
SP GR SPEC: 1.01 — SIGNIFICANT CHANGE UP (ref 1.01–1.02)
TYPE + AB SCN PNL BLD: SIGNIFICANT CHANGE UP
UROBILINOGEN FLD QL: 4 MG/DL
WBC # BLD: 10.7 K/UL — SIGNIFICANT CHANGE UP (ref 4.8–10.8)
WBC # BLD: 7.3 K/UL — SIGNIFICANT CHANGE UP (ref 4.8–10.8)
WBC # FLD AUTO: 10.7 K/UL — SIGNIFICANT CHANGE UP (ref 4.8–10.8)
WBC # FLD AUTO: 7.3 K/UL — SIGNIFICANT CHANGE UP (ref 4.8–10.8)
WBC UR QL: >50

## 2018-05-30 PROCEDURE — 99233 SBSQ HOSP IP/OBS HIGH 50: CPT

## 2018-05-30 PROCEDURE — 99232 SBSQ HOSP IP/OBS MODERATE 35: CPT

## 2018-05-30 RX ORDER — SODIUM CHLORIDE 9 MG/ML
1000 INJECTION, SOLUTION INTRAVENOUS
Qty: 0 | Refills: 0 | Status: DISCONTINUED | OUTPATIENT
Start: 2018-05-30 | End: 2018-06-01

## 2018-05-30 RX ORDER — CEFTRIAXONE 500 MG/1
INJECTION, POWDER, FOR SOLUTION INTRAMUSCULAR; INTRAVENOUS
Qty: 0 | Refills: 0 | Status: DISCONTINUED | OUTPATIENT
Start: 2018-05-30 | End: 2018-05-30

## 2018-05-30 RX ORDER — CEFTRIAXONE 500 MG/1
1000 INJECTION, POWDER, FOR SOLUTION INTRAMUSCULAR; INTRAVENOUS ONCE
Qty: 0 | Refills: 0 | Status: COMPLETED | OUTPATIENT
Start: 2018-05-30 | End: 2018-05-30

## 2018-05-30 RX ORDER — ATORVASTATIN CALCIUM 80 MG/1
20 TABLET, FILM COATED ORAL AT BEDTIME
Qty: 0 | Refills: 0 | Status: DISCONTINUED | OUTPATIENT
Start: 2018-05-30 | End: 2018-06-01

## 2018-05-30 RX ORDER — CEFTRIAXONE 500 MG/1
1000 INJECTION, POWDER, FOR SOLUTION INTRAMUSCULAR; INTRAVENOUS EVERY 24 HOURS
Qty: 0 | Refills: 0 | Status: DISCONTINUED | OUTPATIENT
Start: 2018-05-31 | End: 2018-06-01

## 2018-05-30 RX ORDER — CEFTRIAXONE 500 MG/1
INJECTION, POWDER, FOR SOLUTION INTRAMUSCULAR; INTRAVENOUS
Qty: 0 | Refills: 0 | Status: DISCONTINUED | OUTPATIENT
Start: 2018-05-30 | End: 2018-06-01

## 2018-05-30 RX ORDER — ACETAMINOPHEN 500 MG
650 TABLET ORAL EVERY 6 HOURS
Qty: 0 | Refills: 0 | Status: DISCONTINUED | OUTPATIENT
Start: 2018-05-30 | End: 2018-06-01

## 2018-05-30 RX ADMIN — ATENOLOL 12.5 MILLIGRAM(S): 25 TABLET ORAL at 06:09

## 2018-05-30 RX ADMIN — CLOPIDOGREL BISULFATE 75 MILLIGRAM(S): 75 TABLET, FILM COATED ORAL at 13:49

## 2018-05-30 RX ADMIN — ATORVASTATIN CALCIUM 20 MILLIGRAM(S): 80 TABLET, FILM COATED ORAL at 22:11

## 2018-05-30 RX ADMIN — Medication 81 MILLIGRAM(S): at 13:49

## 2018-05-30 RX ADMIN — Medication 650 MILLIGRAM(S): at 15:00

## 2018-05-30 RX ADMIN — SODIUM CHLORIDE 120 MILLILITER(S): 9 INJECTION, SOLUTION INTRAVENOUS at 18:40

## 2018-05-30 RX ADMIN — CEFTRIAXONE 1000 MILLIGRAM(S): 500 INJECTION, POWDER, FOR SOLUTION INTRAMUSCULAR; INTRAVENOUS at 18:28

## 2018-05-30 RX ADMIN — Medication 300 MILLIGRAM(S): at 13:49

## 2018-05-30 RX ADMIN — VALSARTAN 40 MILLIGRAM(S): 80 TABLET ORAL at 06:09

## 2018-05-30 NOTE — PROGRESS NOTE ADULT - SUBJECTIVE AND OBJECTIVE BOX
DONTA CRABTREE    [id number]    73y      Male    INTERVAL HPI/OVERNIGHT EVENTS: Reports chills and rigors. Reported by RN pt has urinary frequency and Tmax 102.1. Wife at bedside.     Hospital course:  72 yo M with h/o HTN, BPH presents from home with acute onset syncope. Pt reports that he had a colonoscopy on the morning of 5/25 which was uneventful. He returned home and was watching TV when his wife saw that he had slumped backwards in his chair, was unresponsive, and eyes rolled backwards. Wife immediately called EMS, and pt woke up shortly afterwards. Pt denies any palpitations, chest pain, shortness of breath, dizziness, vision changes. Reports that he did not remember the incident at all, and only remembers when he woke up and found himself diaphoretic. In the ED, CT abdomen/pelvis was negative. EKG showed sinus bradycardia. Troponin neg x 2. CT head negative. Carotid US negative. B/l LE US negative for DVT. TTE showed EF 60-65% and grade 1 diastolic dysfunction. Stress test showed apical ischemia. On 5/29, pt had cardiac cath that showed mid LAD 80% and mid to distal LAD 90%. Given recent GIB, no intervention was performed yet, and pt was loaded with plavix. On 5/30, pt developed fevers and increased urinary frequency.     REVIEW OF SYSTEMS:    CONSTITUTIONAL: +chills  RESPIRATORY: No cough   CARDIOVASCULAR: No chest pain  GASTROINTESTINAL: No diarrhea  NEUROLOGICAL: No headaches     Vital Signs Last 24 Hrs  T(C): 38.9 (30 May 2018 13:05), Max: 38.9 (30 May 2018 13:05)  T(F): 102.1 (30 May 2018 13:05), Max: 102.1 (30 May 2018 13:05)  HR: 91 (30 May 2018 12:50) (67 - 91)  BP: 142/80 (30 May 2018 12:50) (84/52 - 142/80)  BP(mean): --  RR: 18 (30 May 2018 10:00) (17 - 24)  SpO2: 95% (29 May 2018 16:15) (94% - 96%)    PHYSICAL EXAM:    GENERAL: NAD, not diaphoretic  HEENT: PERRL, +EOMI, MMM  NECK: soft, Supple   CHEST/LUNG: Clear to percussion bilaterally   HEART: S1S2+, Regular rate and rhythm   ABDOMEN: Soft, Nontender, Nondistended; Bowel sounds present  EXTREMITIES:  no edema      LABS:                        13.8   7.3   )-----------( 173      ( 30 May 2018 06:17 )             42.7     05-30    136  |  100  |  21.0<H>  ----------------------------<  92  4.3   |  24.0  |  1.27    Ca    8.6      30 May 2018 06:17  Mg     2.2     05-30              MEDICATIONS  (STANDING):  allopurinol 300 milliGRAM(s) Oral daily  aspirin  chewable 81 milliGRAM(s) Oral daily  ATENolol  Tablet 12.5 milliGRAM(s) Oral daily  atorvastatin 20 milliGRAM(s) Oral at bedtime  clopidogrel Tablet 75 milliGRAM(s) Oral daily  docusate sodium 100 milliGRAM(s) Oral two times a day  senna 2 Tablet(s) Oral at bedtime  sodium chloride 0.9%. 1000 milliLiter(s) (50 mL/Hr) IV Continuous <Continuous>  valsartan 40 milliGRAM(s) Oral daily    MEDICATIONS  (PRN):  acetaminophen   Tablet 650 milliGRAM(s) Oral every 6 hours PRN For Temp greater than 38 C (100.4 F)      RADIOLOGY & ADDITIONAL TESTS:

## 2018-05-30 NOTE — PROGRESS NOTE ADULT - SUBJECTIVE AND OBJECTIVE BOX
SUBJECTIVE:  Cardiology NP F/U note:  SP: [facility] which revealed:   Severe mid LAD disease and critical mid to distal  LAD disease.  denies complaints of chest pain/sob/dizziness/palps overnight       	  MEDICATIONS:  ATENolol  Tablet 12.5 milliGRAM(s) Oral daily  valsartan 40 milliGRAM(s) Oral daily  docusate sodium 100 milliGRAM(s) Oral two times a day  senna 2 Tablet(s) Oral at bedtime  allopurinol 300 milliGRAM(s) Oral daily  aspirin  chewable 81 milliGRAM(s) Oral daily  clopidogrel Tablet 75 milliGRAM(s) Oral daily  sodium chloride 0.9%. 1000 milliLiter(s) IV Continuous <Continuous>        PHYSICAL EXAM:    T(C): 36.7 (18 @ 10:00), Max: 37.4 (18 @ 21:49)  HR: 71 (18 @ 10:00) (67 - 91)  BP: 84/52 (18 @ 10:00) (84/52 - 133/75)  RR: 18 (18 @ 10:00) (17 - 24)  SpO2: 95% (18 @ 16:15) (94% - 96%)  Wt(kg): --    I&O's Summary    29 May 2018 07:01  -  30 May 2018 07:00  --------------------------------------------------------  IN: 910 mL / OUT: 0 mL / NET: 910 mL        Daily     Daily Weight in k.9 (30 May 2018 00:59)    Appearance: Normal	  HEENT:   Normal oral mucosa, 	  Lymphatic: No lymphadenopathy  Cardiovascular: Normal S1 S2, 78  No JVD, No murmurs, No edema  Respiratory: Lungs clear to auscultation	  Psychiatry: A & O x 3, Mood & affect appropriate  Gastrointestinal:  Soft, Non-tender, + BS	  Skin: No rashes, No ecchymoses, No cyanosis  Neurologic: Non-focal  Extremities: Normal range of motion, No clubbing, cyanosis or edema Right radial site no hematoma/ good pulses and cap refill   Vascular: Peripheral pulses palpable 2+ bilaterally    TELEMETRY: 	 RSR 78 occ pVCs  no events.    ECG:  	  RADIOLOGY:   DIAGNOSTIC TESTING:  [x  ] Echocardiogram:< from: TTE Echo Complete w/Doppler (18 @ 11:00) >   Endocardial visualization was enhanced with intravenous echo contrast.   3. There is moderate concentric left ventricular hypertrophy.   4. Normal global left ventricular systolic function.   5. Left ventricular ejection fraction, by visual estimation, is 60 to   65%.   6. Normal left ventricular internal cavity size.   7. Spectral Doppler shows impaired relaxation pattern of left   ventricular myocardial filling (Grade I diastolic dysfunction).   8. There is no evidence of pericardial effusion.   9. Thickening of the anterior mitral valve leaflet.  10. Dilatation of the aortic root.  11. The aortic valve mean gradient is 3.0 mmHg consistent with normally   opening aortic valve.  12. Trace tricuspid regurgitation.    < end of copied text >    [X ]  Catheterization:  < from: Cardiac Cath Lab - Adult (18 @ 14:34) >  LAD:   --  Mid LAD: There was a 80 % stenosis. In a second lesion, there  was a 90 % stenosis.  CX:   --  Circumflex: There was a 20 % stenosis.  RCA:   --  Proximal RCA: There was a 40 % stenosis.  COMPLICATIONS: No complications occurred during the cath lab visit.  DIAGNOSTIC IMPRESSIONS: Severe mid LAD disease and critical mid to distal  LAD disease.  DIAGNOSTIC RECOMMENDATIONS: Considering recent bleeding. Willplan for  treating with plavix and monitoring. If patient has no active bleeding,  will plan for PCI to LAD with rotational atherectomy on Friday.    [ ] Stress Test:    OTHER: 	    LABS:	 	    CARDIAC MARKERS:                                  13.8   7.3   )-----------( 173      ( 30 May 2018 06:17 )             42.7         136  |  100  |  21.0<H>  ----------------------------<  92  4.3   |  24.0  |  1.27    Ca    8.6      30 May 2018 06:17  Mg     2.2           proBNP:   Lipid Profile:   HgA1c:   TSH:     ASSESSMENT: 73 m adm with syncope  HX: HTN/BPH/ SP: Divertic bleed / colonoscopy  SP LHC : with severe LAD disease          Plan:  Continue current meds and management. ASA/ Plavix / ARB/BB   if tolerate Plavix well will have PCI on friday  Start Statin   check lipid panel

## 2018-05-31 DIAGNOSIS — N39.0 URINARY TRACT INFECTION, SITE NOT SPECIFIED: ICD-10-CM

## 2018-05-31 LAB
ANION GAP SERPL CALC-SCNC: 16 MMOL/L — SIGNIFICANT CHANGE UP (ref 5–17)
BUN SERPL-MCNC: 27 MG/DL — HIGH (ref 8–20)
CALCIUM SERPL-MCNC: 8.7 MG/DL — SIGNIFICANT CHANGE UP (ref 8.6–10.2)
CHLORIDE SERPL-SCNC: 100 MMOL/L — SIGNIFICANT CHANGE UP (ref 98–107)
CHOLEST SERPL-MCNC: 112 MG/DL — SIGNIFICANT CHANGE UP (ref 110–199)
CO2 SERPL-SCNC: 22 MMOL/L — SIGNIFICANT CHANGE UP (ref 22–29)
CREAT SERPL-MCNC: 1.33 MG/DL — HIGH (ref 0.5–1.3)
GLUCOSE SERPL-MCNC: 109 MG/DL — SIGNIFICANT CHANGE UP (ref 70–115)
HCT VFR BLD CALC: 39.4 % — LOW (ref 42–52)
HCT VFR BLD CALC: 41.2 % — LOW (ref 42–52)
HDLC SERPL-MCNC: 43 MG/DL — LOW
HGB BLD-MCNC: 12.9 G/DL — LOW (ref 14–18)
HGB BLD-MCNC: 13.4 G/DL — LOW (ref 14–18)
LIPID PNL WITH DIRECT LDL SERPL: 52 MG/DL — SIGNIFICANT CHANGE UP
MAGNESIUM SERPL-MCNC: 2 MG/DL — SIGNIFICANT CHANGE UP (ref 1.6–2.6)
MCHC RBC-ENTMCNC: 32.5 G/DL — SIGNIFICANT CHANGE UP (ref 32–36)
MCHC RBC-ENTMCNC: 32.5 PG — HIGH (ref 27–31)
MCV RBC AUTO: 100 FL — HIGH (ref 80–94)
PLATELET # BLD AUTO: 183 K/UL — SIGNIFICANT CHANGE UP (ref 150–400)
POTASSIUM SERPL-MCNC: 4.1 MMOL/L — SIGNIFICANT CHANGE UP (ref 3.5–5.3)
POTASSIUM SERPL-SCNC: 4.1 MMOL/L — SIGNIFICANT CHANGE UP (ref 3.5–5.3)
RBC # BLD: 4.12 M/UL — LOW (ref 4.6–6.2)
RBC # FLD: 14.7 % — SIGNIFICANT CHANGE UP (ref 11–15.6)
SODIUM SERPL-SCNC: 138 MMOL/L — SIGNIFICANT CHANGE UP (ref 135–145)
TOTAL CHOLESTEROL/HDL RATIO MEASUREMENT: 3 RATIO — LOW (ref 3.4–9.6)
TRIGL SERPL-MCNC: 85 MG/DL — SIGNIFICANT CHANGE UP (ref 10–200)
WBC # BLD: 13.7 K/UL — HIGH (ref 4.8–10.8)
WBC # FLD AUTO: 13.7 K/UL — HIGH (ref 4.8–10.8)

## 2018-05-31 PROCEDURE — 99232 SBSQ HOSP IP/OBS MODERATE 35: CPT

## 2018-05-31 PROCEDURE — 99233 SBSQ HOSP IP/OBS HIGH 50: CPT

## 2018-05-31 RX ORDER — CLOPIDOGREL BISULFATE 75 MG/1
75 TABLET, FILM COATED ORAL DAILY
Qty: 0 | Refills: 0 | Status: DISCONTINUED | OUTPATIENT
Start: 2018-05-31 | End: 2018-06-01

## 2018-05-31 RX ADMIN — CLOPIDOGREL BISULFATE 75 MILLIGRAM(S): 75 TABLET, FILM COATED ORAL at 13:34

## 2018-05-31 RX ADMIN — ATORVASTATIN CALCIUM 20 MILLIGRAM(S): 80 TABLET, FILM COATED ORAL at 22:44

## 2018-05-31 RX ADMIN — VALSARTAN 40 MILLIGRAM(S): 80 TABLET ORAL at 05:20

## 2018-05-31 RX ADMIN — Medication 300 MILLIGRAM(S): at 13:35

## 2018-05-31 RX ADMIN — CEFTRIAXONE 1000 MILLIGRAM(S): 500 INJECTION, POWDER, FOR SOLUTION INTRAMUSCULAR; INTRAVENOUS at 16:19

## 2018-05-31 RX ADMIN — ATENOLOL 12.5 MILLIGRAM(S): 25 TABLET ORAL at 05:20

## 2018-05-31 NOTE — PROGRESS NOTE ADULT - SUBJECTIVE AND OBJECTIVE BOX
DONTA CRABTREE    [id number]    73y      Male    INTERVAL HPI/OVERNIGHT EVENTS: Rectal bleeding has stopped. Wife at bedside. Pt reports improvement in chills and rigors.    Hospital course:  72 yo M with h/o HTN, BPH presents from home with acute onset syncope. Pt reports that he had a colonoscopy on the morning of  which was uneventful. He returned home and was watching TV when his wife saw that he had slumped backwards in his chair, was unresponsive, and eyes rolled backwards. Wife immediately called EMS, and pt woke up shortly afterwards. Pt denies any palpitations, chest pain, shortness of breath, dizziness, vision changes. Reports that he did not remember the incident at all, and only remembers when he woke up and found himself diaphoretic. In the ED, CT abdomen/pelvis was negative. EKG showed sinus bradycardia. Troponin neg x 2. CT head negative. Carotid US negative. B/l LE US negative for DVT. TTE showed EF 60-65% and grade 1 diastolic dysfunction. Stress test showed apical ischemia. On , pt had cardiac cath that showed mid LAD 80% and mid to distal LAD 90%. Given recent GIB, no intervention was performed yet, and pt was loaded with plavix. On , pt developed fevers and increased urinary frequency. +UA, ceftriaxone started. Pt redeveloped rectal bleeding. Hemoglobin remained relatively stable.     REVIEW OF SYSTEMS:    RESPIRATORY: No cough  CARDIOVASCULAR: No chest pain     Vital Signs Last 24 Hrs  T(C): 38.2 (31 May 2018 10:20), Max: 38.2 (31 May 2018 10:20)  T(F): 100.7 (31 May 2018 10:20), Max: 100.7 (31 May 2018 10:20)  HR: 80 (31 May 2018 10:20) (80 - 112)  BP: 99/61 (31 May 2018 10:20) (99/61 - 126/62)  BP(mean): --  RR: 18 (31 May 2018 10:20) (18 - 18)  SpO2: 92% (31 May 2018 10:20) (92% - 93%)    PHYSICAL EXAM:    GENERAL: NAD   HEENT: PERRL, +EOMI, MMM  NECK: soft, Supple   CHEST/LUNG: Clear to percussion bilaterally; No wheezing  HEART: S1S2+, Regular rate and rhythm  ABDOMEN: Soft, Nontender, Nondistended; Bowel sounds present       LABS:                        13.4   13.7  )-----------( 183      ( 31 May 2018 05:40 )             41.2         138  |  100  |  27.0<H>  ----------------------------<  109  4.1   |  22.0  |  1.33<H>    Ca    8.7      31 May 2018 05:40  Mg     2.0             Urinalysis Basic - ( 30 May 2018 14:44 )    Color: Yellow / Appearance: Clear / S.010 / pH: x  Gluc: x / Ketone: Negative  / Bili: Negative / Urobili: 4 mg/dL   Blood: x / Protein: 30 mg/dL / Nitrite: Negative   Leuk Esterase: Moderate / RBC: 11-25 /HPF / WBC >50   Sq Epi: x / Non Sq Epi: Few / Bacteria: x          MEDICATIONS  (STANDING):  allopurinol 300 milliGRAM(s) Oral daily  ATENolol  Tablet 12.5 milliGRAM(s) Oral daily  atorvastatin 20 milliGRAM(s) Oral at bedtime  cefTRIAXone Injectable. 1000 milliGRAM(s) IV Push every 24 hours  cefTRIAXone Injectable.      clopidogrel Tablet 75 milliGRAM(s) Oral daily  docusate sodium 100 milliGRAM(s) Oral two times a day  lactated ringers. 1000 milliLiter(s) (120 mL/Hr) IV Continuous <Continuous>  senna 2 Tablet(s) Oral at bedtime  sodium chloride 0.9%. 1000 milliLiter(s) (50 mL/Hr) IV Continuous <Continuous>  valsartan 40 milliGRAM(s) Oral daily    MEDICATIONS  (PRN):  acetaminophen   Tablet 650 milliGRAM(s) Oral every 6 hours PRN For Temp greater than 38 C (100.4 F)      RADIOLOGY & ADDITIONAL TESTS:

## 2018-05-31 NOTE — PROGRESS NOTE ADULT - SUBJECTIVE AND OBJECTIVE BOX
[place] [facility] Faculty Practice                                                        Office: [address] [address]                                                       Telephone: [phone]. Fax:[phone]                                                                             PROGRESS NOTE   Reason for follow up:   Syncope                            Overnight: No new events.   Update:   72 yo M with h/o HTN, BPH admitted for syncope.  Cardiac cath 5/29 with LAD disease 80-90%, with history of bleeding, plan is to monitor response to Plavix today and possible cath on Friday.      Subjective: "No bleeding today, so far"   Complains of:   Review of symptoms: Cardiac:  No chest pain. No dyspnea. No palpitations.  Respiratory:no cough. No dyspnea  Gastrointestinal: No diarrhea. No abdominal pain. No bleeding.     Past medical history: No updates.   Chronic conditions:  Hypertension CAD, and PBP    	  Vitals:  T(C): 36.9 (05-31-18 @ 05:19), Max: 38.9 (05-30-18 @ 13:05)  HR: 82 (05-31-18 @ 05:19) (82 - 112)  BP: 126/62 (05-31-18 @ 05:19) (102/64 - 142/80)  RR: 18 (05-31-18 @ 05:19) (18 - 18)  SpO2: 92% (05-31-18 @ 05:19) (92% - 93%)    I&O's Summary  30 May 2018 07:01  -  31 May 2018 07:00  --------------------------------------------------------  IN: 1680 mL / OUT: 400 mL / NET: 1280 mL    PHYSICAL EXAM:  Appearance: Comfortable, obese male in nad.    HEENT:  Head and neck: Atraumatic. Normocephalic.  Normal oral mucosa, PERRL, Neck is supple. No JVD, No carotid bruit.   Neurologic: A & O x 3, no focal deficits. EOMI , Cranial nerves are intact.  Lymphatic: No cervical lymphadenopathy  Cardiovascular: Normal S1 S2, No murmur, rubs/gallops. No JVD, No edema  Respiratory: Lungs clear to auscultation, rr wnl for rate and rhythm, color pink  Gastrointestinal:  Soft, Non-tender, + BS  Lower Extremities: No edema  Psychiatry: Patient is calm. No agitation. Mood & affect appropriate  Skin: No rashes/ ecchymoses/cyanosis/ulcers visualized on the face, hands or feet.    CURRENT MEDICATIONS:  ATENolol  Tablet 12.5 milliGRAM(s) Oral daily  valsartan 40 milliGRAM(s) Oral daily  cefTRIAXone Injectable.  cefTRIAXone Injectable.  docusate sodium  senna  allopurinol  atorvastatin  clopidogrel Tablet  lactated ringers.  sodium chloride 0.9%.    LABS:	 	               13.4   13.7  )-----------( 183      ( 31 May 2018 05:40 )             41.2     138  |  100  |  27.0<H>  ----------------------------<  109  4.1   |  22.0  |  1.33<H>  Ca    8.7      31 May 2018 05:40  Mg     2.0     05-31  Lipid Profile: Date: 05-31 @ 05:40  Total cholesterol 112; Direct LDL: 52; HDL: 43; Triglycerides:85    TELEMETRY:  Sr with isolated episode of PVC"S trigeminy.

## 2018-05-31 NOTE — PROGRESS NOTE ADULT - ATTENDING COMMENTS
pt was seen and examined.   Plan of care d/w pt and np.   There is no bleeding on asa and plavix challenge. Pt will need PCI to LAD.   Lipid panel in am   statin therapy.   I reviewed the above and agree with a/p.
pt was seen and examined. He has a fever, on abx, bleeding present. I started plavix today and asked Dr. Brantley to also review the films.   We will decide on CABG vs. PCI based on bleeding once on antiplatelet therapy. I also asked IR if they will be able to intervene if pt bleeds again to embolize the bleeding source.   I agree with a/p.

## 2018-06-01 ENCOUNTER — TRANSCRIPTION ENCOUNTER (OUTPATIENT)
Age: 73
End: 2018-06-01

## 2018-06-01 VITALS
SYSTOLIC BLOOD PRESSURE: 113 MMHG | DIASTOLIC BLOOD PRESSURE: 67 MMHG | HEART RATE: 84 BPM | OXYGEN SATURATION: 97 % | TEMPERATURE: 99 F | RESPIRATION RATE: 18 BRPM

## 2018-06-01 LAB
-  AMIKACIN: SIGNIFICANT CHANGE UP
-  AMPICILLIN/SULBACTAM: SIGNIFICANT CHANGE UP
-  AMPICILLIN: SIGNIFICANT CHANGE UP
-  AZTREONAM: SIGNIFICANT CHANGE UP
-  CEFAZOLIN: SIGNIFICANT CHANGE UP
-  CEFEPIME: SIGNIFICANT CHANGE UP
-  CEFOXITIN: SIGNIFICANT CHANGE UP
-  CEFTRIAXONE: SIGNIFICANT CHANGE UP
-  CIPROFLOXACIN: SIGNIFICANT CHANGE UP
-  ERTAPENEM: SIGNIFICANT CHANGE UP
-  GENTAMICIN: SIGNIFICANT CHANGE UP
-  IMIPENEM: SIGNIFICANT CHANGE UP
-  LEVOFLOXACIN: SIGNIFICANT CHANGE UP
-  MEROPENEM: SIGNIFICANT CHANGE UP
-  NITROFURANTOIN: SIGNIFICANT CHANGE UP
-  PIPERACILLIN/TAZOBACTAM: SIGNIFICANT CHANGE UP
-  TIGECYCLINE: SIGNIFICANT CHANGE UP
-  TOBRAMYCIN: SIGNIFICANT CHANGE UP
-  TRIMETHOPRIM/SULFAMETHOXAZOLE: SIGNIFICANT CHANGE UP
ANION GAP SERPL CALC-SCNC: 13 MMOL/L — SIGNIFICANT CHANGE UP (ref 5–17)
BUN SERPL-MCNC: 24 MG/DL — HIGH (ref 8–20)
CALCIUM SERPL-MCNC: 8.4 MG/DL — LOW (ref 8.6–10.2)
CHLORIDE SERPL-SCNC: 101 MMOL/L — SIGNIFICANT CHANGE UP (ref 98–107)
CO2 SERPL-SCNC: 22 MMOL/L — SIGNIFICANT CHANGE UP (ref 22–29)
CREAT SERPL-MCNC: 1.1 MG/DL — SIGNIFICANT CHANGE UP (ref 0.5–1.3)
CULTURE RESULTS: SIGNIFICANT CHANGE UP
GLUCOSE SERPL-MCNC: 96 MG/DL — SIGNIFICANT CHANGE UP (ref 70–115)
HCT VFR BLD CALC: 39.2 % — LOW (ref 42–52)
HGB BLD-MCNC: 12.8 G/DL — LOW (ref 14–18)
MAGNESIUM SERPL-MCNC: 2 MG/DL — SIGNIFICANT CHANGE UP (ref 1.6–2.6)
MCHC RBC-ENTMCNC: 32.4 PG — HIGH (ref 27–31)
MCHC RBC-ENTMCNC: 32.7 G/DL — SIGNIFICANT CHANGE UP (ref 32–36)
MCV RBC AUTO: 99.2 FL — HIGH (ref 80–94)
METHOD TYPE: SIGNIFICANT CHANGE UP
ORGANISM # SPEC MICROSCOPIC CNT: SIGNIFICANT CHANGE UP
ORGANISM # SPEC MICROSCOPIC CNT: SIGNIFICANT CHANGE UP
PLATELET # BLD AUTO: 185 K/UL — SIGNIFICANT CHANGE UP (ref 150–400)
POTASSIUM SERPL-MCNC: 3.8 MMOL/L — SIGNIFICANT CHANGE UP (ref 3.5–5.3)
POTASSIUM SERPL-SCNC: 3.8 MMOL/L — SIGNIFICANT CHANGE UP (ref 3.5–5.3)
RBC # BLD: 3.95 M/UL — LOW (ref 4.6–6.2)
RBC # FLD: 14.6 % — SIGNIFICANT CHANGE UP (ref 11–15.6)
SODIUM SERPL-SCNC: 136 MMOL/L — SIGNIFICANT CHANGE UP (ref 135–145)
SPECIMEN SOURCE: SIGNIFICANT CHANGE UP
WBC # BLD: 13.9 K/UL — HIGH (ref 4.8–10.8)
WBC # FLD AUTO: 13.9 K/UL — HIGH (ref 4.8–10.8)

## 2018-06-01 PROCEDURE — 84484 ASSAY OF TROPONIN QUANT: CPT

## 2018-06-01 PROCEDURE — 99285 EMERGENCY DEPT VISIT HI MDM: CPT

## 2018-06-01 PROCEDURE — 93306 TTE W/DOPPLER COMPLETE: CPT

## 2018-06-01 PROCEDURE — 71045 X-RAY EXAM CHEST 1 VIEW: CPT

## 2018-06-01 PROCEDURE — 99152 MOD SED SAME PHYS/QHP 5/>YRS: CPT

## 2018-06-01 PROCEDURE — 78452 HT MUSCLE IMAGE SPECT MULT: CPT

## 2018-06-01 PROCEDURE — 80053 COMPREHEN METABOLIC PANEL: CPT

## 2018-06-01 PROCEDURE — A9500: CPT

## 2018-06-01 PROCEDURE — 85610 PROTHROMBIN TIME: CPT

## 2018-06-01 PROCEDURE — 99239 HOSP IP/OBS DSCHRG MGMT >30: CPT

## 2018-06-01 PROCEDURE — 83735 ASSAY OF MAGNESIUM: CPT

## 2018-06-01 PROCEDURE — C1887: CPT

## 2018-06-01 PROCEDURE — 93970 EXTREMITY STUDY: CPT

## 2018-06-01 PROCEDURE — 87040 BLOOD CULTURE FOR BACTERIA: CPT

## 2018-06-01 PROCEDURE — 93005 ELECTROCARDIOGRAM TRACING: CPT

## 2018-06-01 PROCEDURE — 85730 THROMBOPLASTIN TIME PARTIAL: CPT

## 2018-06-01 PROCEDURE — 74177 CT ABD & PELVIS W/CONTRAST: CPT

## 2018-06-01 PROCEDURE — 80061 LIPID PANEL: CPT

## 2018-06-01 PROCEDURE — 93458 L HRT ARTERY/VENTRICLE ANGIO: CPT

## 2018-06-01 PROCEDURE — C1769: CPT

## 2018-06-01 PROCEDURE — 93880 EXTRACRANIAL BILAT STUDY: CPT

## 2018-06-01 PROCEDURE — 85014 HEMATOCRIT: CPT

## 2018-06-01 PROCEDURE — 85027 COMPLETE CBC AUTOMATED: CPT

## 2018-06-01 PROCEDURE — 87086 URINE CULTURE/COLONY COUNT: CPT

## 2018-06-01 PROCEDURE — 93017 CV STRESS TEST TRACING ONLY: CPT

## 2018-06-01 PROCEDURE — 81001 URINALYSIS AUTO W/SCOPE: CPT

## 2018-06-01 PROCEDURE — 99232 SBSQ HOSP IP/OBS MODERATE 35: CPT

## 2018-06-01 PROCEDURE — 70450 CT HEAD/BRAIN W/O DYE: CPT

## 2018-06-01 PROCEDURE — 82553 CREATINE MB FRACTION: CPT

## 2018-06-01 PROCEDURE — C1894: CPT

## 2018-06-01 PROCEDURE — 85018 HEMOGLOBIN: CPT

## 2018-06-01 PROCEDURE — 80048 BASIC METABOLIC PNL TOTAL CA: CPT

## 2018-06-01 PROCEDURE — 82550 ASSAY OF CK (CPK): CPT

## 2018-06-01 PROCEDURE — 86900 BLOOD TYPING SEROLOGIC ABO: CPT

## 2018-06-01 PROCEDURE — 83605 ASSAY OF LACTIC ACID: CPT

## 2018-06-01 PROCEDURE — 87186 SC STD MICRODIL/AGAR DIL: CPT

## 2018-06-01 PROCEDURE — 86901 BLOOD TYPING SEROLOGIC RH(D): CPT

## 2018-06-01 PROCEDURE — 36415 COLL VENOUS BLD VENIPUNCTURE: CPT

## 2018-06-01 PROCEDURE — 86850 RBC ANTIBODY SCREEN: CPT

## 2018-06-01 RX ORDER — ATORVASTATIN CALCIUM 80 MG/1
1 TABLET, FILM COATED ORAL
Qty: 0 | Refills: 0 | DISCHARGE
Start: 2018-06-01

## 2018-06-01 RX ORDER — VALSARTAN 80 MG/1
1 TABLET ORAL
Qty: 0 | Refills: 0 | DISCHARGE
Start: 2018-06-01

## 2018-06-01 RX ORDER — CEFTRIAXONE 500 MG/1
1 INJECTION, POWDER, FOR SOLUTION INTRAMUSCULAR; INTRAVENOUS
Qty: 0 | Refills: 0 | DISCHARGE
Start: 2018-06-01 | End: 2018-06-07

## 2018-06-01 RX ORDER — ATENOLOL 25 MG/1
12.5 TABLET ORAL
Qty: 0 | Refills: 0 | DISCHARGE
Start: 2018-06-01

## 2018-06-01 RX ADMIN — CEFTRIAXONE 1000 MILLIGRAM(S): 500 INJECTION, POWDER, FOR SOLUTION INTRAMUSCULAR; INTRAVENOUS at 15:56

## 2018-06-01 RX ADMIN — ATENOLOL 12.5 MILLIGRAM(S): 25 TABLET ORAL at 05:45

## 2018-06-01 RX ADMIN — Medication 300 MILLIGRAM(S): at 14:17

## 2018-06-01 RX ADMIN — VALSARTAN 40 MILLIGRAM(S): 80 TABLET ORAL at 05:45

## 2018-06-01 NOTE — DIETITIAN INITIAL EVALUATION ADULT. - OTHER INFO
Pt reports good po intake at meals, no complaints at this time.  Pt states never receiving formal nutrition education, but does follow low na meal plan.  Pt declined nutrition education at this time.

## 2018-06-01 NOTE — DISCHARGE NOTE ADULT - MEDICATION SUMMARY - MEDICATIONS TO CHANGE
I will SWITCH the dose or number of times a day I take the medications listed below when I get home from the hospital:    Diovan 80 mg oral tablet  -- 1 tab(s) by mouth once a day

## 2018-06-01 NOTE — PROGRESS NOTE ADULT - PROBLEM SELECTOR PROBLEM 3
Rectal bleed
Essential hypertension
Essential hypertension
Gout
UTI (urinary tract infection)
UTI (urinary tract infection)

## 2018-06-01 NOTE — DISCHARGE NOTE ADULT - PATIENT PORTAL LINK FT
You can access the [facility] Patient Portal, offered by [facility], by registering with the following website: [email or web address]

## 2018-06-01 NOTE — PROGRESS NOTE ADULT - PROBLEM SELECTOR PROBLEM 4
PVC (premature ventricular contraction)
Gout
Gout
PVC (premature ventricular contraction)
PVC (premature ventricular contraction)
Prophylactic measure

## 2018-06-01 NOTE — DISCHARGE NOTE ADULT - PLAN OF CARE
Therapeutic optimization Cardiac cath showed at mid LAD: There was a 80 % stenosis, and at a second lesion, there was a 90 % stenosis. You were unable to tolerate plavix and aspirin due to rectal bleeding. For transfer to [facility] for further evaluation. Hemoglobin was 12.8 upon discharge. You will need to localize the source of your bleed. For transfer to [facility] for further evaluation. Urine culture +serratia marescens. Continue with ceftriaxone until June 7. Continue with valsartan at lower dose. Continue with allopurinol. Continue with atenolol.

## 2018-06-01 NOTE — PROGRESS NOTE ADULT - PROBLEM SELECTOR PLAN 1
?Cardiogenic etiology  EKG showed sinus samaria  CT head negative  Cardiac enzymes negative  TTE pending  Carotid US negative  Cardio consult appreciated - possible stress test
?Cardiogenic etiology  EKG showed sinus samaria with frequent PVCs  CT head negative  Cardiac enzymes negative  TTE: EF 60-65%, grade 1 diastolic dysfunction  Carotid US negative  Cardio consult appreciated - stress test in AM
?Cardiogenic etiology  EKG showed sinus samaria with frequent PVCs  CT head negative  Cardiac enzymes negative  TTE: EF 60-65%, grade 1 diastolic dysfunction  Carotid US negative  Cardio consult appreciated - stress test today
S/p cardiac cath  Restart plavix   plan for possible PIC of LAD on 6/1  appreciate cardio consult
S/p cardiac cath  Unable to tolerate plavix at this point due to bleeding  Plan for transfer to [facility] under Dr. Crockett as per family request  appreciate cardio consult
Unclear etiology  UA, urine culture, blood cultures pending  Acetaminophen PRN fevers  Given +increased urinary frequency, if +UA, will start ceftriaxone

## 2018-06-01 NOTE — DISCHARGE NOTE ADULT - MEDICATION SUMMARY - MEDICATIONS TO TAKE
I will START or STAY ON the medications listed below when I get home from the hospital:    valsartan 40 mg oral tablet  -- 1 tab(s) by mouth once a day  -- Indication: For Essential hypertension    allopurinol 300 mg oral tablet  -- 1 tab(s) by mouth once a day  -- Indication: For Gout    atorvastatin 20 mg oral tablet  -- 1 tab(s) by mouth once a day (at bedtime)  -- Indication: For HLD    atenolol  -- 12.5 milligram(s) by mouth once a day  -- Indication: For PVC (premature ventricular contraction)    cefTRIAXone  -- 1 gram(s) intravenous once a day  -- Indication: For UTI (urinary tract infection)

## 2018-06-01 NOTE — PROGRESS NOTE ADULT - PROBLEM SELECTOR PROBLEM 1
CAD (coronary artery disease)
CAD (coronary artery disease)
Syncope, unspecified syncope type
Fever

## 2018-06-01 NOTE — PROGRESS NOTE ADULT - PROVIDER SPECIALTY LIST ADULT
Cardiology
Hospitalist

## 2018-06-01 NOTE — PROGRESS NOTE ADULT - PROBLEM SELECTOR PLAN 2
S/p cardiac cath  On asa and plavix  plan for possible PIC of LAD on 6/1  appreciate cardio consult
Has h/o hemorrhoids and diverticular bleed  Bleeding has stopped  Monitor Hgb  If persists, will notify Dr. Pal
Has h/o hemorrhoids and diverticular bleed  Bleeding has stopped  hgb stable
Has h/o hemorrhoids and diverticular bleed  Hgb 12.8 today  Transfuse to keep Hgb >9
Has h/o hemorrhoids and diverticular bleed  Intermittent bleeding  Monitor Hgb and transfuse to keep Hgb >9  Dr. Pal notified
Normotensive  c/w diovan with holding parameters

## 2018-06-01 NOTE — DISCHARGE NOTE ADULT - SECONDARY DIAGNOSIS.
Rectal bleed UTI (urinary tract infection) Essential hypertension Gout PVC (premature ventricular contraction)

## 2018-06-01 NOTE — PROGRESS NOTE ADULT - SUBJECTIVE AND OBJECTIVE BOX
CHIEF COMPLAINT:Patient is a 73y old  Male who is seen with CAD and GIB.   Still having bleeding, BRBPR and clots.  Off plavix now.   Not a candidate for PCI.   Asked NM to perform bleeding scan and IR to evaluate for embolization.   No cp or sob, no syncope or presyncope.     Allergies  No Known Allergies    MEDICATIONS:  ATENolol  Tablet 12.5 milliGRAM(s) Oral daily  valsartan 40 milliGRAM(s) Oral daily  cefTRIAXone Injectable. 1000 milliGRAM(s) IV Push every 24 hours  cefTRIAXone Injectable.      acetaminophen   Tablet 650 milliGRAM(s) Oral every 6 hours PRN  docusate sodium 100 milliGRAM(s) Oral two times a day  senna 2 Tablet(s) Oral at bedtime  allopurinol 300 milliGRAM(s) Oral daily  atorvastatin 20 milliGRAM(s) Oral at bedtime  lactated ringers. 1000 milliLiter(s) IV Continuous <Continuous>  sodium chloride 0.9%. 1000 milliLiter(s) IV Continuous <Continuous>    PHYSICAL EXAM:  T(C): 36.8 (06-01-18 @ 10:33), Max: 37.3 (05-31-18 @ 16:18)  HR: 77 (06-01-18 @ 10:33) (72 - 78)  BP: 116/64 (06-01-18 @ 10:33) (104/58 - 116/64)  RR: 17 (06-01-18 @ 10:33) (17 - 18)  I&O's Summary    31 May 2018 07:01  -  01 Jun 2018 07:00  --------------------------------------------------------  IN: 1800 mL / OUT: 100 mL / NET: 1700 mL    Appearance: Normal	  HEENT:   NC/AT  Eye: Pink Conjunctiva  Lungs: CTA B/L  CVS: RRR, Normal S1 and S2, No Edema  Pulses: Normal distal pulses.  Neuro: A&O x3    LABS:	 	                        12.8   13.9  )-----------( 185      ( 01 Jun 2018 05:53 )             39.2     06-01    136  |  101  |  24.0<H>  ----------------------------<  96  3.8   |  22.0  |  1.10    Ca    8.4<L>      01 Jun 2018 05:53  Mg     2.0     06-01      proBNP:   Lipid Profile:   HgA1c:   TSH:     ASSESSMENT/PLAN:

## 2018-06-01 NOTE — DISCHARGE NOTE ADULT - PROVIDER TOKENS
FREE:[LAST:[Keira],PHONE:[(   )    -],FAX:[(   )    -],ADDRESS:[[facility]]]

## 2018-06-01 NOTE — PROGRESS NOTE ADULT - ASSESSMENT
1. BP to goal  2. BRBPR, ASA and lovenox dc  3. Cont with current meds   4.plan stress test in am
1. Carotid duplex, lower ext Doppler and CT was reviewed  2. Pt will need ischemic orona  3. stress test tuesday  4 addl low dose bb  5. dvt prophylaxis
1. PVCs, syncope. Pt is on BB  2. LVEF is normal  3. Stress test with apical ischemia, due to syncope, PVCs advised cardiac cath. He is agreeable to proceed understanding risks, benefits and alternatives
72 yo M with h/o HTN, BPH admitted for syncope. Found to have rectal bleed, CAD, now with UTI
72 yo M with h/o HTN, BPH admitted for syncope. Found to have rectal bleed, CAD, now with UTI
72 yo M with h/o HTN, BPH here for r/o cardiogenic syncope.
72 yo M with h/o HTN, BPH here for r/o cardiogenic syncope.
74 yo M with h/o HTN, BPH admitted for syncope.  Cardiac cath 5/29 with LAD disease 80-90%, with history of bleeding, plan is to monitor response to Plavix today and possible cath on Friday.        Problem/Plan - 1:  ·  Problem: CAD (coronary artery disease).  Plan: S/p cardiac cath  Restart Plavix today and monitor for bleeding.    Plan for possible PIC of LAD on 6/1  Ct Lipitor 20 po qhs     Problem/Plan - 2:  ·  Problem: PVC (premature ventricular contraction).  Plan: C/w atenolol.  Insolated episode of trigeminy overnight       Problem/Plan - 3:  ·  Problem: Essential hypertension.  Plan: C/w atenolol and valsartan.
74 yo M with h/o HTN, BPH here for r/o cardiogenic syncope.
I spoke with Dr. Brantley, possibility to perform CABG x3 was discussed.   I will ask for NM GI bleeding scan  Cont with current meds  Rocephin for UTI.  If bleeding source is identified and embolized, we can then try DAPT and if no bleeding can proceed with PCI.   Cont with statin   Venodynes for DVT prophylaxis.
74 yo M with h/o HTN, BPH admitted for syncope. Found to have rectal bleed, CAD, now with fevers.

## 2018-06-01 NOTE — PROGRESS NOTE ADULT - PROBLEM SELECTOR PLAN 3
Has h/o hemorrhoids and diverticular bleed  No further episodes of bleeding  Monitor Hgb - slight downtrend today, possibly due to dilution
+UA   Urine culture + serratia marescens  c/w ceftriaxone
+UA  Urine cultures pending  c/w ceftriaxone
C/w allopurinol
Hypotensive  Decrease valsartan dose   C/w atenolol
Normotensive  c/w diovan with holding parameters

## 2018-06-01 NOTE — DISCHARGE NOTE ADULT - HOSPITAL COURSE
74 yo M with h/o HTN, BPH presents from home with acute onset syncope. Pt reports that he had a colonoscopy on the morning of 5/25 which was uneventful. He returned home and was watching TV when his wife saw that he had slumped backwards in his chair, was unresponsive, and eyes rolled backwards. Wife immediately called EMS, and pt woke up shortly afterwards. Pt denies any palpitations, chest pain, shortness of breath, dizziness, vision changes. Reports that he did not remember the incident at all, and only remembers when he woke up and found himself diaphoretic. In the ED, CT abdomen/pelvis was negative. EKG showed sinus bradycardia. Troponin neg x 2. CT head negative. Carotid US negative. B/l LE US negative for DVT. TTE showed EF 60-65% and grade 1 diastolic dysfunction. Stress test showed apical ischemia. On 5/29, pt had cardiac cath that showed mid LAD 80% and mid to distal LAD 90%. Given recent GIB, no intervention was performed yet, and pt was loaded with plavix. On 5/30, pt developed fevers and increased urinary frequency. +UA, ceftriaxone started. Pt redeveloped rectal bleeding. Hemoglobin remained relatively stable. Patient was rechallenged on the plavix, but continued to have rectal bleeding. Bleeding scan was ordered to evaluate for source of gastrointestinal bleed, but was cancelled as patient's family requested patient to be transferred to [place]. Signout given to Dr. Crockett who accepted patient.     Time to discharge: >35 minutes spent coordinating care

## 2018-06-01 NOTE — DISCHARGE NOTE ADULT - CARE PLAN
Principal Discharge DX:	Coronary artery disease involving native coronary artery of native heart without angina pectoris  Goal:	Therapeutic optimization  Assessment and plan of treatment:	Cardiac cath showed at mid LAD: There was a 80 % stenosis, and at a second lesion, there was a 90 % stenosis. You were unable to tolerate plavix and aspirin due to rectal bleeding. For transfer to [facility] for further evaluation.  Secondary Diagnosis:	Rectal bleed  Assessment and plan of treatment:	Hemoglobin was 12.8 upon discharge. You will need to localize the source of your bleed. For transfer to [facility] for further evaluation.  Secondary Diagnosis:	UTI (urinary tract infection)  Assessment and plan of treatment:	Urine culture +serratia marescens. Continue with ceftriaxone until June 7.  Secondary Diagnosis:	Essential hypertension  Assessment and plan of treatment:	Continue with valsartan at lower dose.  Secondary Diagnosis:	Gout  Assessment and plan of treatment:	Continue with allopurinol.  Secondary Diagnosis:	PVC (premature ventricular contraction)  Assessment and plan of treatment:	Continue with atenolol.

## 2018-06-01 NOTE — PROGRESS NOTE ADULT - PROBLEM SELECTOR PROBLEM 2
CAD (coronary artery disease)
Essential hypertension
Rectal bleed

## 2018-06-01 NOTE — PROGRESS NOTE ADULT - PROBLEM SELECTOR PROBLEM 5
Essential hypertension
Prophylactic measure
Prophylactic measure

## 2018-06-04 LAB
-  ERTAPENEM: SIGNIFICANT CHANGE UP
CULTURE RESULTS: SIGNIFICANT CHANGE UP
CULTURE RESULTS: SIGNIFICANT CHANGE UP
ORGANISM # SPEC MICROSCOPIC CNT: SIGNIFICANT CHANGE UP
SPECIMEN SOURCE: SIGNIFICANT CHANGE UP
SPECIMEN SOURCE: SIGNIFICANT CHANGE UP

## 2018-09-17 ENCOUNTER — APPOINTMENT (OUTPATIENT)
Dept: UROLOGY | Facility: CLINIC | Age: 73
End: 2018-09-17
Payer: MEDICARE

## 2018-09-17 VITALS
TEMPERATURE: 97.7 F | SYSTOLIC BLOOD PRESSURE: 127 MMHG | RESPIRATION RATE: 17 BRPM | DIASTOLIC BLOOD PRESSURE: 82 MMHG | WEIGHT: 218 LBS | HEART RATE: 93 BPM | BODY MASS INDEX: 32.29 KG/M2 | HEIGHT: 69 IN

## 2018-09-17 PROCEDURE — 99213 OFFICE O/P EST LOW 20 MIN: CPT

## 2018-10-24 ENCOUNTER — APPOINTMENT (OUTPATIENT)
Dept: UROLOGY | Facility: CLINIC | Age: 73
End: 2018-10-24
Payer: MEDICARE

## 2018-10-24 PROCEDURE — 99213 OFFICE O/P EST LOW 20 MIN: CPT

## 2019-01-22 ENCOUNTER — TRANSCRIPTION ENCOUNTER (OUTPATIENT)
Age: 74
End: 2019-01-22

## 2019-04-21 ENCOUNTER — TRANSCRIPTION ENCOUNTER (OUTPATIENT)
Age: 74
End: 2019-04-21

## 2019-04-24 ENCOUNTER — APPOINTMENT (OUTPATIENT)
Dept: UROLOGY | Facility: CLINIC | Age: 74
End: 2019-04-24
Payer: MEDICARE

## 2019-04-24 VITALS
HEART RATE: 84 BPM | SYSTOLIC BLOOD PRESSURE: 117 MMHG | RESPIRATION RATE: 18 BRPM | WEIGHT: 218 LBS | HEIGHT: 69 IN | BODY MASS INDEX: 32.29 KG/M2 | DIASTOLIC BLOOD PRESSURE: 77 MMHG

## 2019-04-24 PROCEDURE — 99213 OFFICE O/P EST LOW 20 MIN: CPT

## 2019-04-24 NOTE — HISTORY OF PRESENT ILLNESS
[FreeTextEntry1] : 74 year old male presents for BPH f/u.\par Hx of cardiac stent placement last year and is on Brilinta.\par Nocturia every 2 hours.\par Denies diurnal frequency.\par Prostate volume was 101 cc in Sep 2015.\par He stopped on Flomax because his legs felt weak.\par He takes Cialis 0.5mg daily.\par We will schedule him for UDS.

## 2019-04-24 NOTE — ADDENDUM
[FreeTextEntry1] :  I, Flakita Espinal, acted solely as a scribe for Dr. Kris Alarcon. The documentation recorded by the scribe accurately reflects the service I personally performed and the decision by me.\par

## 2019-04-24 NOTE — ASSESSMENT
[FreeTextEntry1] : Prostate volume was 101 cc in Sep 2015.\par He stopped on Flomax because his legs felt weak.\par He takes Cialis 0.5mg daily.\par We will schedule him for UDS.

## 2019-05-01 ENCOUNTER — APPOINTMENT (OUTPATIENT)
Dept: UROLOGY | Facility: CLINIC | Age: 74
End: 2019-05-01
Payer: MEDICARE

## 2019-05-01 ENCOUNTER — OUTPATIENT (OUTPATIENT)
Dept: OUTPATIENT SERVICES | Facility: HOSPITAL | Age: 74
LOS: 1 days | End: 2019-05-01
Payer: MEDICARE

## 2019-05-01 DIAGNOSIS — M50.20 OTHER CERVICAL DISC DISPLACEMENT, UNSPECIFIED CERVICAL REGION: Chronic | ICD-10-CM

## 2019-05-01 DIAGNOSIS — R35.0 FREQUENCY OF MICTURITION: ICD-10-CM

## 2019-05-01 PROCEDURE — 51784 ANAL/URINARY MUSCLE STUDY: CPT | Mod: 26

## 2019-05-01 PROCEDURE — 51798 US URINE CAPACITY MEASURE: CPT | Mod: 59

## 2019-05-01 PROCEDURE — 51741 ELECTRO-UROFLOWMETRY FIRST: CPT

## 2019-05-01 PROCEDURE — 51728 CYSTOMETROGRAM W/VP: CPT | Mod: 26

## 2019-05-01 PROCEDURE — 51797 INTRAABDOMINAL PRESSURE TEST: CPT

## 2019-05-01 PROCEDURE — 51728 CYSTOMETROGRAM W/VP: CPT

## 2019-05-01 PROCEDURE — 51797 INTRAABDOMINAL PRESSURE TEST: CPT | Mod: 26

## 2019-05-01 PROCEDURE — 51741 ELECTRO-UROFLOWMETRY FIRST: CPT | Mod: 26

## 2019-05-01 PROCEDURE — 51784 ANAL/URINARY MUSCLE STUDY: CPT

## 2019-05-01 NOTE — HISTORY OF PRESENT ILLNESS
[FreeTextEntry1] : 74 year old male presents for UDS today.\par Hx of high PSA\par PSA of 18.4 on 04/16/19\par PSA 22.6 in Oct 2018\par PSA 32.8 in Aug 2018\par 2 MRI prostate in the past both with no suspicious lesions and PIRADS-2. Prostate volume of 101cc on MRI in Sep 2015.\par Pt had 2 NBP in the past, both were negative for malignancy. \par On UDS today, pt has an obstructed pattern. peak pressure of 74. The patient has bladder outlet obstruction. The intravesical voiding pressure is increased. The patient has incomplete bladder emptying, a post void residual of 100 cc.\par \par Discussed Laser enucleation of prostate with morcellation (HOLEP/ThuLEP). \par Indications, options including chronic Wright catheter, suprapubic catheter, intermittent self-catheterization, transurethral resection of prostate, transurethral vaporization of prostate with laser or electrocautery, open or laparoscopic enucleation of the prostate, all discussed. \par Potential complications of transurethral holmium or thulium laser enucleation of prostate with morcellation discussed. This included risk of infection, bleeding, transfusion, conversion to open enucleation, need for staged procedure, adjacent organ injury, bladder injury, clot retention of urine requiring return to operating room for cystoscopy clot evacuation, prolonged duration of Wright catheterization, urethral stricture, transient or permanent urinary incontinence, retrograde ejaculation is a permanent and irreversible complication, redo or staged surgery due to equipment malfunction, anesthetic complications, cardiac complications, all discussed. \par Will schedule.\par

## 2019-05-01 NOTE — ASSESSMENT
[FreeTextEntry1] : On UDS today, pt has an obstructed pattern. peak pressure of 74 cm of water. The patient has bladder outlet obstruction. The intravesical voiding pressure is increased. The patient has incomplete bladder emptying, a post void residual of 100 cc.\par Discussed Laser enucleation of prostate with morcellation (HOLEP/ThuLEP).

## 2019-05-03 DIAGNOSIS — R97.20 ELEVATED PROSTATE SPECIFIC ANTIGEN [PSA]: ICD-10-CM

## 2019-05-03 DIAGNOSIS — N40.1 BENIGN PROSTATIC HYPERPLASIA WITH LOWER URINARY TRACT SYMPTOMS: ICD-10-CM

## 2019-05-31 ENCOUNTER — APPOINTMENT (OUTPATIENT)
Dept: UROLOGY | Facility: CLINIC | Age: 74
End: 2019-05-31
Payer: MEDICARE

## 2019-05-31 DIAGNOSIS — R97.20 ELEVATED PROSTATE, SPECIFIC ANTIGEN [PSA]: ICD-10-CM

## 2019-05-31 DIAGNOSIS — R35.0 FREQUENCY OF MICTURITION: ICD-10-CM

## 2019-05-31 PROCEDURE — 99214 OFFICE O/P EST MOD 30 MIN: CPT

## 2019-05-31 NOTE — HISTORY OF PRESENT ILLNESS
[FreeTextEntry1] : see notes from Dr. Alarcon.\par Chart reviewed.\par Reviewed HOLEP r/b/a/pc, post op course.\par Will schedule

## 2019-07-02 ENCOUNTER — OUTPATIENT (OUTPATIENT)
Dept: OUTPATIENT SERVICES | Facility: HOSPITAL | Age: 74
LOS: 1 days | End: 2019-07-02
Payer: MEDICARE

## 2019-07-02 VITALS
RESPIRATION RATE: 16 BRPM | SYSTOLIC BLOOD PRESSURE: 147 MMHG | HEART RATE: 62 BPM | TEMPERATURE: 98 F | OXYGEN SATURATION: 96 % | WEIGHT: 205.03 LBS | DIASTOLIC BLOOD PRESSURE: 88 MMHG | HEIGHT: 69 IN

## 2019-07-02 DIAGNOSIS — Z98.890 OTHER SPECIFIED POSTPROCEDURAL STATES: Chronic | ICD-10-CM

## 2019-07-02 DIAGNOSIS — Z98.49 CATARACT EXTRACTION STATUS, UNSPECIFIED EYE: Chronic | ICD-10-CM

## 2019-07-02 DIAGNOSIS — G47.33 OBSTRUCTIVE SLEEP APNEA (ADULT) (PEDIATRIC): ICD-10-CM

## 2019-07-02 DIAGNOSIS — Z01.818 ENCOUNTER FOR OTHER PREPROCEDURAL EXAMINATION: ICD-10-CM

## 2019-07-02 DIAGNOSIS — Z95.5 PRESENCE OF CORONARY ANGIOPLASTY IMPLANT AND GRAFT: ICD-10-CM

## 2019-07-02 DIAGNOSIS — R97.20 ELEVATED PROSTATE SPECIFIC ANTIGEN [PSA]: ICD-10-CM

## 2019-07-02 DIAGNOSIS — N40.1 BENIGN PROSTATIC HYPERPLASIA WITH LOWER URINARY TRACT SYMPTOMS: ICD-10-CM

## 2019-07-02 DIAGNOSIS — N40.0 BENIGN PROSTATIC HYPERPLASIA WITHOUT LOWER URINARY TRACT SYMPTOMS: ICD-10-CM

## 2019-07-02 DIAGNOSIS — Z29.9 ENCOUNTER FOR PROPHYLACTIC MEASURES, UNSPECIFIED: ICD-10-CM

## 2019-07-02 DIAGNOSIS — M50.20 OTHER CERVICAL DISC DISPLACEMENT, UNSPECIFIED CERVICAL REGION: Chronic | ICD-10-CM

## 2019-07-02 LAB
ANION GAP SERPL CALC-SCNC: 12 MMOL/L — SIGNIFICANT CHANGE UP (ref 5–17)
BLD GP AB SCN SERPL QL: NEGATIVE — SIGNIFICANT CHANGE UP
BUN SERPL-MCNC: 20 MG/DL — SIGNIFICANT CHANGE UP (ref 7–23)
CALCIUM SERPL-MCNC: 9.6 MG/DL — SIGNIFICANT CHANGE UP (ref 8.4–10.5)
CHLORIDE SERPL-SCNC: 104 MMOL/L — SIGNIFICANT CHANGE UP (ref 96–108)
CO2 SERPL-SCNC: 25 MMOL/L — SIGNIFICANT CHANGE UP (ref 22–31)
CREAT SERPL-MCNC: 1.09 MG/DL — SIGNIFICANT CHANGE UP (ref 0.5–1.3)
GLUCOSE SERPL-MCNC: 88 MG/DL — SIGNIFICANT CHANGE UP (ref 70–99)
HCT VFR BLD CALC: 45.2 % — SIGNIFICANT CHANGE UP (ref 39–50)
HGB BLD-MCNC: 15 G/DL — SIGNIFICANT CHANGE UP (ref 13–17)
MCHC RBC-ENTMCNC: 33 PG — SIGNIFICANT CHANGE UP (ref 27–34)
MCHC RBC-ENTMCNC: 33.2 GM/DL — SIGNIFICANT CHANGE UP (ref 32–36)
MCV RBC AUTO: 99.3 FL — SIGNIFICANT CHANGE UP (ref 80–100)
PLATELET # BLD AUTO: 222 K/UL — SIGNIFICANT CHANGE UP (ref 150–400)
POTASSIUM SERPL-MCNC: 4.6 MMOL/L — SIGNIFICANT CHANGE UP (ref 3.5–5.3)
POTASSIUM SERPL-SCNC: 4.6 MMOL/L — SIGNIFICANT CHANGE UP (ref 3.5–5.3)
RBC # BLD: 4.55 M/UL — SIGNIFICANT CHANGE UP (ref 4.2–5.8)
RBC # FLD: 14.6 % — HIGH (ref 10.3–14.5)
RH IG SCN BLD-IMP: POSITIVE — SIGNIFICANT CHANGE UP
SODIUM SERPL-SCNC: 141 MMOL/L — SIGNIFICANT CHANGE UP (ref 135–145)
WBC # BLD: 5.33 K/UL — SIGNIFICANT CHANGE UP (ref 3.8–10.5)
WBC # FLD AUTO: 5.33 K/UL — SIGNIFICANT CHANGE UP (ref 3.8–10.5)

## 2019-07-02 PROCEDURE — 87086 URINE CULTURE/COLONY COUNT: CPT

## 2019-07-02 PROCEDURE — 86901 BLOOD TYPING SEROLOGIC RH(D): CPT

## 2019-07-02 PROCEDURE — 86900 BLOOD TYPING SEROLOGIC ABO: CPT

## 2019-07-02 PROCEDURE — G0463: CPT

## 2019-07-02 PROCEDURE — 80048 BASIC METABOLIC PNL TOTAL CA: CPT

## 2019-07-02 PROCEDURE — 85027 COMPLETE CBC AUTOMATED: CPT

## 2019-07-02 PROCEDURE — 86850 RBC ANTIBODY SCREEN: CPT

## 2019-07-02 RX ORDER — CEFAZOLIN SODIUM 1 G
2000 VIAL (EA) INJECTION ONCE
Refills: 0 | Status: DISCONTINUED | OUTPATIENT
Start: 2019-07-09 | End: 2019-07-09

## 2019-07-02 RX ORDER — SODIUM CHLORIDE 9 MG/ML
3 INJECTION INTRAMUSCULAR; INTRAVENOUS; SUBCUTANEOUS EVERY 8 HOURS
Refills: 0 | Status: DISCONTINUED | OUTPATIENT
Start: 2019-07-09 | End: 2019-07-09

## 2019-07-02 RX ORDER — LIDOCAINE HCL 20 MG/ML
0.2 VIAL (ML) INJECTION ONCE
Refills: 0 | Status: DISCONTINUED | OUTPATIENT
Start: 2019-07-09 | End: 2019-07-09

## 2019-07-02 NOTE — H&P PST ADULT - NSICDXPROBLEM_GEN_ALL_CORE_FT
PROBLEM DIAGNOSES  Problem: BPH (benign prostatic hyperplasia)  Assessment and Plan: cystoscopy, laser enucleation of the prostate with/without  morecellation   PST instructions provided, surgical scrub given, patient verbalized understanding.   CBC, BMP, urine cx , T&S collected and sent.   Will see PCP to follow up on tick bite     Problem: Presence of stent in coronary artery  Assessment and Plan: Cardiology note will be faxed to [facility]  will continue Aspirin     Problem: Need for prophylactic measure  Assessment and Plan: The Caprini score indicates that this patient is at high risk for a VTE event (score 6 or greater). Surgical patients in this group will benefit from both pharmacologic prophylaxis and intermittent compression devices.  The surgical team will determine the balance between VTE risk and bleeding risk, and other clinical considerations PROBLEM DIAGNOSES  Problem: BPH (benign prostatic hyperplasia)  Assessment and Plan: cystoscopy, laser enucleation of the prostate with/without  morecellation   PST instructions provided, surgical scrub given, patient verbalized understanding.   CBC, BMP, urine cx , T&S collected and sent.   Will see PCP to follow up on tick bite     Problem: Obstructive sleep apnea  Assessment and Plan: OR booking notified   BEV precautions     Problem: Presence of stent in coronary artery  Assessment and Plan: Cardiology note will be faxed to [facility]  will continue Aspirin     Problem: Need for prophylactic measure  Assessment and Plan: The Caprini score indicates that this patient is at high risk for a VTE event (score 6 or greater). Surgical patients in this group will benefit from both pharmacologic prophylaxis and intermittent compression devices.  The surgical team will determine the balance between VTE risk and bleeding risk, and other clinical considerations

## 2019-07-02 NOTE — H&P PST ADULT - NSICDXFAMILYHX_GEN_ALL_CORE_FT
FAMILY HISTORY:  Father  Still living? Unknown  Family history of essential hypertension, Age at diagnosis: Age Unknown

## 2019-07-02 NOTE — H&P PST ADULT - ANESTHESIA, PREVIOUS REACTION, PROFILE
was told by anesthesiologist in the past that he has narrow trachea ( " narrow windpipe ") , denies anesthesia complications

## 2019-07-02 NOTE — H&P PST ADULT - HISTORY OF PRESENT ILLNESS
74 yo M with h/o HTN, BPH presents from home with acute onset syncope. Pt reports that he had a colonoscopy on the morning of 5/25 which was uneventful. He returned home and was watching TV when his wife saw that he had slumped backwards in his chair, was unresponsive, and eyes rolled backwards. Wife immediately called EMS, and pt woke up shortly afterwards. Pt denies any palpitations, chest pain, shortness of breath, dizziness, vision changes. Reports that he did not remember the incident at all, and only remembers when he woke up and found himself diaphoretic.    In the ED, CT abdomen/pelvis was negative. EKG showed sinus bradycardia. Troponin neg x 1. 74 yr old male with h/o CAD - 4 cardiac stents in LAD ( 6/2018), no longer on Brilinta, ASA only ( as per cardiologist ), HTN, HLD, Gout, BPH, patient c/o urinary frequency, poor flow, h/o multiple, benign,  prostate biopsies, presents to [facility] for scheduled cystoscopy, laser enucleation of the prostate with/without morecellation on 7/9/19. Denies fever, chills, no acute complaints. Ambulating without assistance. Accompanied by wife.     Patient reports tick bite 2 days ago, patient removed tick by himself within 24 hr, will see PCP today for follow up. No ring noted. 74 yr old male with h/o CAD - 4 cardiac stents in LAD ( 6/2018), no longer on Brilinta, ASA only ( as per cardiologist ), HTN, HLD, Gout, BPH, patient c/o urinary frequency, poor flow, h/o multiple, benign,  prostate biopsies, presents to [facility] for scheduled cystoscopy, laser enucleation of the prostate with/without morecellation on 7/9/19. Denies fever, chills, no acute complaints. Ambulating without assistance. Accompanied by wife.     Patient reports tick bite 2 days ago, patient removed tick by himself within 24 hr, will see PCP tomorrow 7/3/19  for follow up. No ring noted.

## 2019-07-02 NOTE — H&P PST ADULT - NEGATIVE NEUROLOGICAL SYMPTOMS
no headache/no facial palsy/no tremors/no weakness/no generalized seizures/no vertigo/no difficulty walking/no confusion/no loss of consciousness/no hemiparesis

## 2019-07-02 NOTE — H&P PST ADULT - NSICDXPASTSURGICALHX_GEN_ALL_CORE_FT
PAST SURGICAL HISTORY:  Cervical disc displacement replaced c7 - and c 6 disc    History of colonoscopy 2018 PAST SURGICAL HISTORY:  Cervical disc displacement replaced c7 - and c 6 disc, Cervical fusion 2014    History of cataract surgery b/l few years ago    History of colonoscopy 2018    History of prostate biopsy multiple- benign    History of shoulder surgery right rotator cuff x1, left rotater cuff x 2    S/P cardiac catheterization 5/2018 and 6/2018 s/p 4 stents in LAD

## 2019-07-02 NOTE — H&P PST ADULT - ASSESSMENT
OBIEI VTE 2.0 SCORE [CLOT updated 2019]    AGE RELATED RISK FACTORS                                                       MOBILITY RELATED FACTORS  [ ] Age 41-60 years                                            (1 Point)                    [ ] Bed rest                                                        (1 Point)  [ ] Age: 61-74 years                                           (2 Points)                  [ ] Plaster cast                                                   (2 Points)  [x] Age= 75 years                                              (3 Points)                    [ ] Bed bound for more than 72 hours                 (2 Points)    DISEASE RELATED RISK FACTORS                                               GENDER SPECIFIC FACTORS  [ ] Edema in the lower extremities                       (1 Point)              [ ] Pregnancy                                                     (1 Point)  [ ] Varicose veins                                               (1 Point)                     [ ] Post-partum < 6 weeks                                   (1 Point)             [ x] BMI > 25 Kg/m2                                            (1 Point)                     [ ] Hormonal therapy  or oral contraception          (1 Point)                 [ ] Sepsis (in the previous month)                        (1 Point)               [ ] History of pregnancy complications                 (1 point)  [ ] Pneumonia or serious lung disease                                               [ ] Unexplained or recurrent                     (1 Point)           (in the previous month)                               (1 Point)  [ ] Abnormal pulmonary function test                     (1 Point)                 SURGERY RELATED RISK FACTORS  [ ] Acute myocardial infarction                              (1 Point)               [ ]  Section                                             (1 Point)  [ ] Congestive heart failure (in the previous month)  (1 Point)      [ ] Minor surgery                                                  (1 Point)   [ ] Inflammatory bowel disease                             (1 Point)               [ ] Arthroscopic surgery                                        (2 Points)  [ ] Central venous access                                      (2 Points)                [x ] General surgery lasting more than 45 minutes (2 points)  [ ] Malignancy- Present or previous                   (2 Points)                [ ] Elective arthroplasty                                         (5 points)    [ ] Stroke (in the previous month)                          (5 Points)                                                                                                                                                           HEMATOLOGY RELATED FACTORS                                                 TRAUMA RELATED RISK FACTORS  [ ] Prior episodes of VTE                                     (3 Points)                [ ] Fracture of the hip, pelvis, or leg                       (5 Points)  [ ] Positive family history for VTE                         (3 Points)             [ ] Acute spinal cord injury (in the previous month)  (5 Points)  [ ] Prothrombin 07444 A                                     (3 Points)               [ ] Paralysis  (less than 1 month)                             (5 Points)  [ ] Factor V Leiden                                             (3 Points)                  [ ] Multiple Trauma within 1 month                        (5 Points)  [ ] Lupus anticoagulants                                     (3 Points)                                                           [ ] Anticardiolipin antibodies                               (3 Points)                                                       [ ] High homocysteine in the blood                      (3 Points)                                             [ ] Other congenital or acquired thrombophilia      (3 Points)                                                [ ] Heparin induced thrombocytopenia                  (3 Points)                                     Total Score [  6        ]

## 2019-07-02 NOTE — H&P PST ADULT - NSICDXPASTMEDICALHX_GEN_ALL_CORE_FT
PAST MEDICAL HISTORY:  Diverticular disease     Hard of hearing, bilateral wears bilateral hearing aids. not with patient    Hemorrhoids     HTN (hypertension) PAST MEDICAL HISTORY:  BPH (benign prostatic hyperplasia)     CAD (coronary artery disease)     Hard of hearing, bilateral wears bilateral hearing aids    History of diverticulosis on colonoscopy    History of gout     History of hemorrhoids cauterization ( h/o bleeding due to Plavix )    History of hypertension no longer on medications    History of psoriasis     History of syncope 2/2019 on the plane to [place], was thoroughly evaluated , off HTN medications, was dehydrated.    Hyperlipidemia     BEV (obstructive sleep apnea) severe, not using CPAP    Presence of stent in coronary artery 4 stents in LAD 6/2018 now on ASA

## 2019-07-02 NOTE — H&P PST ADULT - NEGATIVE CARDIOVASCULAR SYMPTOMS
no chest pain/no orthopnea/no palpitations/no dyspnea on exertion/no paroxysmal nocturnal dyspnea no chest pain/no paroxysmal nocturnal dyspnea/no peripheral edema/no dyspnea on exertion/no orthopnea/no palpitations

## 2019-07-03 LAB
CULTURE RESULTS: SIGNIFICANT CHANGE UP
SPECIMEN SOURCE: SIGNIFICANT CHANGE UP

## 2019-07-08 ENCOUNTER — TRANSCRIPTION ENCOUNTER (OUTPATIENT)
Age: 74
End: 2019-07-08

## 2019-07-09 ENCOUNTER — RESULT REVIEW (OUTPATIENT)
Age: 74
End: 2019-07-09

## 2019-07-09 ENCOUNTER — INPATIENT (INPATIENT)
Facility: HOSPITAL | Age: 74
LOS: 0 days | Discharge: HOME | DRG: 713 | End: 2019-07-10
Attending: UROLOGY | Admitting: UROLOGY
Payer: COMMERCIAL

## 2019-07-09 ENCOUNTER — APPOINTMENT (OUTPATIENT)
Dept: UROLOGY | Facility: HOSPITAL | Age: 74
End: 2019-07-09

## 2019-07-09 VITALS
HEART RATE: 80 BPM | HEIGHT: 69 IN | TEMPERATURE: 98 F | RESPIRATION RATE: 16 BRPM | DIASTOLIC BLOOD PRESSURE: 81 MMHG | OXYGEN SATURATION: 95 % | SYSTOLIC BLOOD PRESSURE: 142 MMHG | WEIGHT: 205.03 LBS

## 2019-07-09 DIAGNOSIS — Z98.890 OTHER SPECIFIED POSTPROCEDURAL STATES: Chronic | ICD-10-CM

## 2019-07-09 DIAGNOSIS — N40.1 BENIGN PROSTATIC HYPERPLASIA WITH LOWER URINARY TRACT SYMPTOMS: ICD-10-CM

## 2019-07-09 DIAGNOSIS — R97.20 ELEVATED PROSTATE SPECIFIC ANTIGEN [PSA]: ICD-10-CM

## 2019-07-09 DIAGNOSIS — Z98.49 CATARACT EXTRACTION STATUS, UNSPECIFIED EYE: Chronic | ICD-10-CM

## 2019-07-09 DIAGNOSIS — M50.20 OTHER CERVICAL DISC DISPLACEMENT, UNSPECIFIED CERVICAL REGION: Chronic | ICD-10-CM

## 2019-07-09 LAB
ANION GAP SERPL CALC-SCNC: 13 MMOL/L — SIGNIFICANT CHANGE UP (ref 5–17)
BASOPHILS # BLD AUTO: 0 K/UL — SIGNIFICANT CHANGE UP (ref 0–0.2)
BASOPHILS NFR BLD AUTO: 0.2 % — SIGNIFICANT CHANGE UP (ref 0–2)
BUN SERPL-MCNC: 20 MG/DL — SIGNIFICANT CHANGE UP (ref 7–23)
CALCIUM SERPL-MCNC: 8.5 MG/DL — SIGNIFICANT CHANGE UP (ref 8.4–10.5)
CHLORIDE SERPL-SCNC: 101 MMOL/L — SIGNIFICANT CHANGE UP (ref 96–108)
CO2 SERPL-SCNC: 26 MMOL/L — SIGNIFICANT CHANGE UP (ref 22–31)
CREAT SERPL-MCNC: 1.24 MG/DL — SIGNIFICANT CHANGE UP (ref 0.5–1.3)
EOSINOPHIL # BLD AUTO: 0.1 K/UL — SIGNIFICANT CHANGE UP (ref 0–0.5)
EOSINOPHIL NFR BLD AUTO: 1 % — SIGNIFICANT CHANGE UP (ref 0–6)
GLUCOSE SERPL-MCNC: 100 MG/DL — HIGH (ref 70–99)
HCT VFR BLD CALC: 43.4 % — SIGNIFICANT CHANGE UP (ref 39–50)
HGB BLD-MCNC: 14.8 G/DL — SIGNIFICANT CHANGE UP (ref 13–17)
LYMPHOCYTES # BLD AUTO: 0.7 K/UL — LOW (ref 1–3.3)
LYMPHOCYTES # BLD AUTO: 9.5 % — LOW (ref 13–44)
MCHC RBC-ENTMCNC: 34 GM/DL — SIGNIFICANT CHANGE UP (ref 32–36)
MCHC RBC-ENTMCNC: 34.7 PG — HIGH (ref 27–34)
MCV RBC AUTO: 102 FL — HIGH (ref 80–100)
MONOCYTES # BLD AUTO: 0.1 K/UL — SIGNIFICANT CHANGE UP (ref 0–0.9)
MONOCYTES NFR BLD AUTO: 1.5 % — LOW (ref 2–14)
NEUTROPHILS # BLD AUTO: 6.6 K/UL — SIGNIFICANT CHANGE UP (ref 1.8–7.4)
NEUTROPHILS NFR BLD AUTO: 87.8 % — HIGH (ref 43–77)
PLATELET # BLD AUTO: 188 K/UL — SIGNIFICANT CHANGE UP (ref 150–400)
POTASSIUM SERPL-MCNC: 4.6 MMOL/L — SIGNIFICANT CHANGE UP (ref 3.5–5.3)
POTASSIUM SERPL-SCNC: 4.6 MMOL/L — SIGNIFICANT CHANGE UP (ref 3.5–5.3)
RBC # BLD: 4.25 M/UL — SIGNIFICANT CHANGE UP (ref 4.2–5.8)
RBC # FLD: 13.9 % — SIGNIFICANT CHANGE UP (ref 10.3–14.5)
RH IG SCN BLD-IMP: POSITIVE — SIGNIFICANT CHANGE UP
SODIUM SERPL-SCNC: 140 MMOL/L — SIGNIFICANT CHANGE UP (ref 135–145)
WBC # BLD: 7.6 K/UL — SIGNIFICANT CHANGE UP (ref 3.8–10.5)
WBC # FLD AUTO: 7.6 K/UL — SIGNIFICANT CHANGE UP (ref 3.8–10.5)

## 2019-07-09 PROCEDURE — 88341 IMHCHEM/IMCYTCHM EA ADD ANTB: CPT | Mod: 26

## 2019-07-09 PROCEDURE — 88305 TISSUE EXAM BY PATHOLOGIST: CPT | Mod: 26

## 2019-07-09 PROCEDURE — 88342 IMHCHEM/IMCYTCHM 1ST ANTB: CPT | Mod: 26

## 2019-07-09 RX ORDER — FUROSEMIDE 40 MG
10 TABLET ORAL ONCE
Refills: 0 | Status: COMPLETED | OUTPATIENT
Start: 2019-07-10 | End: 2019-07-10

## 2019-07-09 RX ORDER — LIDOCAINE 4 G/100G
1 CREAM TOPICAL
Refills: 0 | Status: DISCONTINUED | OUTPATIENT
Start: 2019-07-09 | End: 2019-07-10

## 2019-07-09 RX ORDER — SENNA PLUS 8.6 MG/1
1 TABLET ORAL AT BEDTIME
Refills: 0 | Status: DISCONTINUED | OUTPATIENT
Start: 2019-07-09 | End: 2019-07-10

## 2019-07-09 RX ORDER — ACETAMINOPHEN 500 MG
650 TABLET ORAL EVERY 6 HOURS
Refills: 0 | Status: DISCONTINUED | OUTPATIENT
Start: 2019-07-09 | End: 2019-07-10

## 2019-07-09 RX ORDER — CEFAZOLIN SODIUM 1 G
2000 VIAL (EA) INJECTION EVERY 8 HOURS
Refills: 0 | Status: DISCONTINUED | OUTPATIENT
Start: 2019-07-09 | End: 2019-07-10

## 2019-07-09 RX ORDER — ASPIRIN/CALCIUM CARB/MAGNESIUM 324 MG
81 TABLET ORAL DAILY
Refills: 0 | Status: DISCONTINUED | OUTPATIENT
Start: 2019-07-09 | End: 2019-07-10

## 2019-07-09 RX ORDER — FUROSEMIDE 40 MG
10 TABLET ORAL ONCE
Refills: 0 | Status: COMPLETED | OUTPATIENT
Start: 2019-07-09 | End: 2019-07-09

## 2019-07-09 RX ORDER — CEFAZOLIN SODIUM 1 G
2000 VIAL (EA) INJECTION EVERY 8 HOURS
Refills: 0 | Status: DISCONTINUED | OUTPATIENT
Start: 2019-07-09 | End: 2019-07-09

## 2019-07-09 RX ORDER — HYDROMORPHONE HYDROCHLORIDE 2 MG/ML
0.5 INJECTION INTRAMUSCULAR; INTRAVENOUS; SUBCUTANEOUS
Refills: 0 | Status: DISCONTINUED | OUTPATIENT
Start: 2019-07-09 | End: 2019-07-10

## 2019-07-09 RX ORDER — HEPARIN SODIUM 5000 [USP'U]/ML
5000 INJECTION INTRAVENOUS; SUBCUTANEOUS EVERY 8 HOURS
Refills: 0 | Status: DISCONTINUED | OUTPATIENT
Start: 2019-07-09 | End: 2019-07-10

## 2019-07-09 RX ORDER — ALLOPURINOL 300 MG
300 TABLET ORAL DAILY
Refills: 0 | Status: DISCONTINUED | OUTPATIENT
Start: 2019-07-09 | End: 2019-07-10

## 2019-07-09 RX ORDER — SODIUM CHLORIDE 9 MG/ML
1000 INJECTION INTRAMUSCULAR; INTRAVENOUS; SUBCUTANEOUS
Refills: 0 | Status: DISCONTINUED | OUTPATIENT
Start: 2019-07-09 | End: 2019-07-10

## 2019-07-09 RX ORDER — ONDANSETRON 8 MG/1
4 TABLET, FILM COATED ORAL ONCE
Refills: 0 | Status: DISCONTINUED | OUTPATIENT
Start: 2019-07-09 | End: 2019-07-10

## 2019-07-09 RX ORDER — ATORVASTATIN CALCIUM 80 MG/1
10 TABLET, FILM COATED ORAL AT BEDTIME
Refills: 0 | Status: DISCONTINUED | OUTPATIENT
Start: 2019-07-09 | End: 2019-07-10

## 2019-07-09 RX ORDER — DOCUSATE SODIUM 100 MG
100 CAPSULE ORAL THREE TIMES A DAY
Refills: 0 | Status: DISCONTINUED | OUTPATIENT
Start: 2019-07-09 | End: 2019-07-10

## 2019-07-09 RX ADMIN — Medication 100 MILLIGRAM(S): at 23:28

## 2019-07-09 RX ADMIN — SODIUM CHLORIDE 100 MILLILITER(S): 9 INJECTION INTRAMUSCULAR; INTRAVENOUS; SUBCUTANEOUS at 18:51

## 2019-07-09 RX ADMIN — Medication 10 MILLIGRAM(S): at 18:29

## 2019-07-09 RX ADMIN — HEPARIN SODIUM 5000 UNIT(S): 5000 INJECTION INTRAVENOUS; SUBCUTANEOUS at 23:16

## 2019-07-09 RX ADMIN — Medication 100 MILLIGRAM(S): at 23:16

## 2019-07-09 NOTE — PROGRESS NOTE ADULT - ASSESSMENT
A/P: 74y Male s/p HoLEP, post op lab stable, CBI clear    plan  CBI over night  DVT prophylaxis/OOB  Incentive spirometry  Analgesia and antiemetics as needed  regular Diet  AM labs

## 2019-07-09 NOTE — PROGRESS NOTE ADULT - SUBJECTIVE AND OBJECTIVE BOX
Post op Check    Pt 74 M hx of BPH w urinary obstruction. s/p HoLEP. Seen and examined without complaints. Pain is controlled. Denies SOB/CP/N/V.     Vital Signs Last 24 Hrs  T(C): 36.6 (09 Jul 2019 17:45), Max: 36.7 (09 Jul 2019 14:52)  T(F): 97.9 (09 Jul 2019 17:45), Max: 98.1 (09 Jul 2019 14:52)  HR: 82 (09 Jul 2019 20:00) (62 - 82)  BP: 120/65 (09 Jul 2019 20:00) (111/71 - 142/81)  BP(mean): 87 (09 Jul 2019 20:00) (84 - 101)  RR: 17 (09 Jul 2019 20:00) (14 - 17)  SpO2: 97% (09 Jul 2019 20:00) (92% - 98%)    I&O's Summary    09 Jul 2019 07:01  -  09 Jul 2019 20:10  --------------------------------------------------------  IN: 500 mL / OUT: 0 mL / NET: 500 mL        Physical Exam  Gen: NAD, A&Ox3  Pulm: No respiratory distress, no subcostal retractions  CV: RRR, no JVD  Abd: Soft, NT, ND  : 3 way almendarez in place, CBI running water clear                          14.8   7.6   )-----------( 188      ( 09 Jul 2019 18:43 )             43.4       07-09    140  |  101  |  20  ----------------------------<  100<H>  4.6   |  26  |  1.24    Ca    8.5      09 Jul 2019 18:43

## 2019-07-10 ENCOUNTER — TRANSCRIPTION ENCOUNTER (OUTPATIENT)
Age: 74
End: 2019-07-10

## 2019-07-10 VITALS
OXYGEN SATURATION: 94 % | DIASTOLIC BLOOD PRESSURE: 70 MMHG | RESPIRATION RATE: 18 BRPM | SYSTOLIC BLOOD PRESSURE: 114 MMHG | HEART RATE: 62 BPM | TEMPERATURE: 98 F

## 2019-07-10 LAB
ANION GAP SERPL CALC-SCNC: 10 MMOL/L — SIGNIFICANT CHANGE UP (ref 5–17)
BASOPHILS # BLD AUTO: 0 K/UL — SIGNIFICANT CHANGE UP (ref 0–0.2)
BASOPHILS NFR BLD AUTO: 0.2 % — SIGNIFICANT CHANGE UP (ref 0–2)
BUN SERPL-MCNC: 23 MG/DL — SIGNIFICANT CHANGE UP (ref 7–23)
CALCIUM SERPL-MCNC: 8.2 MG/DL — LOW (ref 8.4–10.5)
CHLORIDE SERPL-SCNC: 101 MMOL/L — SIGNIFICANT CHANGE UP (ref 96–108)
CO2 SERPL-SCNC: 27 MMOL/L — SIGNIFICANT CHANGE UP (ref 22–31)
CREAT SERPL-MCNC: 1.23 MG/DL — SIGNIFICANT CHANGE UP (ref 0.5–1.3)
EOSINOPHIL # BLD AUTO: 0 K/UL — SIGNIFICANT CHANGE UP (ref 0–0.5)
EOSINOPHIL NFR BLD AUTO: 0.1 % — SIGNIFICANT CHANGE UP (ref 0–6)
GLUCOSE SERPL-MCNC: 139 MG/DL — HIGH (ref 70–99)
HCT VFR BLD CALC: 43.1 % — SIGNIFICANT CHANGE UP (ref 39–50)
HGB BLD-MCNC: 14 G/DL — SIGNIFICANT CHANGE UP (ref 13–17)
LYMPHOCYTES # BLD AUTO: 0.7 K/UL — LOW (ref 1–3.3)
LYMPHOCYTES # BLD AUTO: 5.4 % — LOW (ref 13–44)
MCHC RBC-ENTMCNC: 32.4 GM/DL — SIGNIFICANT CHANGE UP (ref 32–36)
MCHC RBC-ENTMCNC: 33.3 PG — SIGNIFICANT CHANGE UP (ref 27–34)
MCV RBC AUTO: 103 FL — HIGH (ref 80–100)
MONOCYTES # BLD AUTO: 0.3 K/UL — SIGNIFICANT CHANGE UP (ref 0–0.9)
MONOCYTES NFR BLD AUTO: 2.8 % — SIGNIFICANT CHANGE UP (ref 2–14)
NEUTROPHILS # BLD AUTO: 11.2 K/UL — HIGH (ref 1.8–7.4)
NEUTROPHILS NFR BLD AUTO: 91.4 % — HIGH (ref 43–77)
PLATELET # BLD AUTO: 211 K/UL — SIGNIFICANT CHANGE UP (ref 150–400)
POTASSIUM SERPL-MCNC: 4.2 MMOL/L — SIGNIFICANT CHANGE UP (ref 3.5–5.3)
POTASSIUM SERPL-SCNC: 4.2 MMOL/L — SIGNIFICANT CHANGE UP (ref 3.5–5.3)
RBC # BLD: 4.2 M/UL — SIGNIFICANT CHANGE UP (ref 4.2–5.8)
RBC # FLD: 13.7 % — SIGNIFICANT CHANGE UP (ref 10.3–14.5)
SODIUM SERPL-SCNC: 138 MMOL/L — SIGNIFICANT CHANGE UP (ref 135–145)
WBC # BLD: 12.2 K/UL — HIGH (ref 3.8–10.5)
WBC # FLD AUTO: 12.2 K/UL — HIGH (ref 3.8–10.5)

## 2019-07-10 PROCEDURE — C1889: CPT

## 2019-07-10 PROCEDURE — 88341 IMHCHEM/IMCYTCHM EA ADD ANTB: CPT

## 2019-07-10 PROCEDURE — C1782: CPT

## 2019-07-10 PROCEDURE — 80048 BASIC METABOLIC PNL TOTAL CA: CPT

## 2019-07-10 PROCEDURE — 86901 BLOOD TYPING SEROLOGIC RH(D): CPT

## 2019-07-10 PROCEDURE — 88305 TISSUE EXAM BY PATHOLOGIST: CPT

## 2019-07-10 PROCEDURE — 85027 COMPLETE CBC AUTOMATED: CPT

## 2019-07-10 PROCEDURE — 86900 BLOOD TYPING SEROLOGIC ABO: CPT

## 2019-07-10 PROCEDURE — 52649 PROSTATE LASER ENUCLEATION: CPT

## 2019-07-10 PROCEDURE — 88342 IMHCHEM/IMCYTCHM 1ST ANTB: CPT

## 2019-07-10 RX ORDER — SENNA PLUS 8.6 MG/1
1 TABLET ORAL
Qty: 0 | Refills: 0 | DISCHARGE
Start: 2019-07-10

## 2019-07-10 RX ORDER — DOCUSATE SODIUM 100 MG
1 CAPSULE ORAL
Qty: 0 | Refills: 0 | DISCHARGE
Start: 2019-07-10

## 2019-07-10 RX ORDER — CEPHALEXIN 500 MG
1 CAPSULE ORAL
Qty: 9 | Refills: 0
Start: 2019-07-10 | End: 2019-07-12

## 2019-07-10 RX ORDER — TICAGRELOR 90 MG/1
1 TABLET ORAL
Qty: 0 | Refills: 0 | DISCHARGE

## 2019-07-10 RX ADMIN — Medication 300 MILLIGRAM(S): at 11:35

## 2019-07-10 RX ADMIN — Medication 100 MILLIGRAM(S): at 13:53

## 2019-07-10 RX ADMIN — Medication 100 MILLIGRAM(S): at 05:07

## 2019-07-10 RX ADMIN — Medication 100 MILLIGRAM(S): at 05:08

## 2019-07-10 RX ADMIN — Medication 10 MILLIGRAM(S): at 05:07

## 2019-07-10 RX ADMIN — HEPARIN SODIUM 5000 UNIT(S): 5000 INJECTION INTRAVENOUS; SUBCUTANEOUS at 13:53

## 2019-07-10 RX ADMIN — HEPARIN SODIUM 5000 UNIT(S): 5000 INJECTION INTRAVENOUS; SUBCUTANEOUS at 05:07

## 2019-07-10 RX ADMIN — Medication 81 MILLIGRAM(S): at 11:35

## 2019-07-10 NOTE — DISCHARGE NOTE PROVIDER - NSDCFUADDINST_GEN_ALL_CORE_FT
1. Follow up with Dr. King in 2 weeks  2. Take medications as prescribed. May continue home dose aspirin  3.  Drink plenty of fluids.  No heavy lifting >10lbs or straining for 4 to 6 weeks. Avoid constipation. You may have intermittent pink/blood tinged urine and urinary dribbling.  This is normal. If your urine becomes bright red or with clots, please call the office.   4. Call the office if you have fever greater than 101, difficulty urinating or unable to urinate, pain not relieved with pain medication, nausea/vomiting.

## 2019-07-10 NOTE — DISCHARGE NOTE PROVIDER - CARE PROVIDER_API CALL
Jorge Luis King)  Urology  [address] [place] [address]  Phone: [phone]  Fax: [phone]  Follow Up Time:

## 2019-07-10 NOTE — DISCHARGE NOTE PROVIDER - HOSPITAL COURSE
74 year old male with BPH presents for elective hoLEP 7/9. Patient tolerated procedure well without complications. Admitted postoperatively on CBI. CBI weaned and clamped POD#1 and urine remained clear. Wright discontinued with passed TOV. On the day of discharge, patient was tolerating diet, ambulating, voiding and pain controlled. Patient is deemed stable for discharge home with follow up instructions

## 2019-07-10 NOTE — DISCHARGE NOTE PROVIDER - NSDCCPCAREPLAN_GEN_ALL_CORE_FT
PRINCIPAL DISCHARGE DIAGNOSIS  Diagnosis: BPH with urinary obstruction  Assessment and Plan of Treatment: you had a laser enucleation of the prostate   Call the office if you experience fever, chills, uncontrolled pain, the inability to tolerate liquids, or the urine does not flow   take stool softeners to avoid constipation,  continue to walk frequently, return to daily living activities slowly, no heavy lifting greater than 10lbs for 4-6 weeks, follow up Dr King in two weeks      SECONDARY DISCHARGE DIAGNOSES  Diagnosis: CAD in native artery  Assessment and Plan of Treatment: continue your baby aspirin   you are no longer supposed to take your Brilinta

## 2019-07-10 NOTE — PROGRESS NOTE ADULT - SUBJECTIVE AND OBJECTIVE BOX
Urology PA Progress Note    Subjective:  Patient seen and examined at bedside. No acute events overnight. Patient mentions having some burning around the catheter    Objective:  Vital signs  T(F): , Max: 98.1 (07-09-19 @ 14:52)  HR: 62 (07-10-19 @ 05:06)  BP: 102/73 (07-10-19 @ 05:06)  SpO2: 93% (07-10-19 @ 07:30)  Wt(kg): --    Output     07-09 @ 07:01  -  07-10 @ 07:00  --------------------------------------------------------  IN: 1740 mL / OUT: 0 mL / NET: 1740 mL    07-10 @ 07:01  -  07-10 @ 08:04  --------------------------------------------------------  IN: 0 mL / OUT: 50 mL / NET: -50 mL      Physical Exam:  Gen: NAD. AAOx3  Pulm: nonlabored  Abd: soft, NT/ND  : almendarez in place with clear pink output, CBI clamped    Labs:  07-10  12.2  / 43.1  /x      07-10  x     / x     /1.23                           14.0   12.2  )-----------( 211      ( 10 Jul 2019 07:20 )             43.1     07-10    138  |  101  |  23  ----------------------------<  139<H>  4.2   |  27  |  1.23    Ca    8.2<L>      10 Jul 2019 07:19

## 2019-07-10 NOTE — DISCHARGE NOTE NURSING/CASE MANAGEMENT/SOCIAL WORK - NSDCDPATPORTLINK_GEN_ALL_CORE
You can access the [facility] Portal, offered by [facility], by registering with the following website: [email or web address]

## 2019-07-10 NOTE — PROGRESS NOTE ADULT - ASSESSMENT
74M s/p hoLEP POD#1    - AM Labs  - Diet  - check color  - lasix 10mg x 1  - D/C JEFFREY almendarez, PVR  - OOB/ambulate  - Encourage incentive spirometry  - DVT ppx  - Dispo home today for TOWILLIS Keflex x 3 days

## 2019-07-11 PROBLEM — E78.5 HYPERLIPIDEMIA, UNSPECIFIED: Chronic | Status: ACTIVE | Noted: 2019-07-02

## 2019-07-11 PROBLEM — Z87.898 PERSONAL HISTORY OF OTHER SPECIFIED CONDITIONS: Chronic | Status: ACTIVE | Noted: 2019-07-02

## 2019-07-11 PROBLEM — G47.33 OBSTRUCTIVE SLEEP APNEA (ADULT) (PEDIATRIC): Chronic | Status: ACTIVE | Noted: 2019-07-02

## 2019-07-11 PROBLEM — Z87.2 PERSONAL HISTORY OF DISEASES OF THE SKIN AND SUBCUTANEOUS TISSUE: Chronic | Status: ACTIVE | Noted: 2019-07-02

## 2019-07-11 PROBLEM — Z86.79 PERSONAL HISTORY OF OTHER DISEASES OF THE CIRCULATORY SYSTEM: Chronic | Status: ACTIVE | Noted: 2019-07-02

## 2019-07-11 PROBLEM — Z87.19 PERSONAL HISTORY OF OTHER DISEASES OF THE DIGESTIVE SYSTEM: Chronic | Status: ACTIVE | Noted: 2019-07-02

## 2019-07-11 PROBLEM — Z87.39 PERSONAL HISTORY OF OTHER DISEASES OF THE MUSCULOSKELETAL SYSTEM AND CONNECTIVE TISSUE: Chronic | Status: ACTIVE | Noted: 2019-07-02

## 2019-07-11 PROBLEM — N40.0 BENIGN PROSTATIC HYPERPLASIA WITHOUT LOWER URINARY TRACT SYMPTOMS: Chronic | Status: ACTIVE | Noted: 2019-07-02

## 2019-07-11 PROBLEM — I25.10 ATHEROSCLEROTIC HEART DISEASE OF NATIVE CORONARY ARTERY WITHOUT ANGINA PECTORIS: Chronic | Status: ACTIVE | Noted: 2019-07-02

## 2019-07-18 LAB — SURGICAL PATHOLOGY STUDY: SIGNIFICANT CHANGE UP

## 2019-07-29 ENCOUNTER — APPOINTMENT (OUTPATIENT)
Dept: UROLOGY | Facility: CLINIC | Age: 74
End: 2019-07-29
Payer: MEDICARE

## 2019-07-29 PROCEDURE — 99024 POSTOP FOLLOW-UP VISIT: CPT

## 2019-07-29 NOTE — ASSESSMENT
[FreeTextEntry1] : hoLEP\par -voiding without difficulty\par -continue Cialis\par -PVR 14 cc\par -PVR, uroflowmetry, PSA in 3 months\par \par RTC in 3 months

## 2019-07-29 NOTE — HISTORY OF PRESENT ILLNESS
[FreeTextEntry1] : This is a 74 year old gentleman who presents for follow up after holmium laser enucleation of the prostate on 7/9/19.  \par path  = 40 grams benign\par Feels well today.  As per patient nocturia improved from 5-6 times a night to 2 times a night.  Voiding without difficulty, notes urine is a little darker in color but no difficulty urinating.\par Reports strong urinary stream after procedure.\par No leakage.\par Denies any dysuria, no nausea/vomiting, return of appetite, moving bowels without difficulty.\par

## 2019-07-29 NOTE — PHYSICAL EXAM
[General Appearance - Well Developed] : well developed [General Appearance - Well Nourished] : well nourished [Normal Appearance] : normal appearance [General Appearance - In No Acute Distress] : no acute distress [Well Groomed] : well groomed [Abdomen Tenderness] : non-tender [Costovertebral Angle Tenderness] : no ~M costovertebral angle tenderness [Abdomen Soft] : soft [] : no respiratory distress [Edema] : no peripheral edema [Respiration, Rhythm And Depth] : normal respiratory rhythm and effort [Exaggerated Use Of Accessory Muscles For Inspiration] : no accessory muscle use [Oriented To Time, Place, And Person] : oriented to person, place, and time [Not Anxious] : not anxious [Mood] : the mood was normal [Affect] : the affect was normal

## 2019-10-23 ENCOUNTER — APPOINTMENT (OUTPATIENT)
Dept: UROLOGY | Facility: CLINIC | Age: 74
End: 2019-10-23
Payer: MEDICARE

## 2019-10-23 DIAGNOSIS — R39.15 URGENCY OF URINATION: ICD-10-CM

## 2019-10-23 DIAGNOSIS — N40.1 BENIGN PROSTATIC HYPERPLASIA WITH LOWER URINARY TRACT SYMPMS: ICD-10-CM

## 2019-10-23 DIAGNOSIS — N13.8 BENIGN PROSTATIC HYPERPLASIA WITH LOWER URINARY TRACT SYMPMS: ICD-10-CM

## 2019-10-23 DIAGNOSIS — R97.20 ELEVATED PROSTATE, SPECIFIC ANTIGEN [PSA]: ICD-10-CM

## 2019-10-23 PROCEDURE — 99214 OFFICE O/P EST MOD 30 MIN: CPT

## 2020-02-18 ENCOUNTER — EMERGENCY (EMERGENCY)
Facility: HOSPITAL | Age: 75
LOS: 1 days | End: 2020-02-18
Attending: EMERGENCY MEDICINE
Payer: MEDICARE

## 2020-02-18 VITALS
HEIGHT: 69 IN | RESPIRATION RATE: 16 BRPM | SYSTOLIC BLOOD PRESSURE: 156 MMHG | OXYGEN SATURATION: 95 % | WEIGHT: 220.02 LBS | TEMPERATURE: 98 F | HEART RATE: 100 BPM | DIASTOLIC BLOOD PRESSURE: 96 MMHG

## 2020-02-18 DIAGNOSIS — Z98.49 CATARACT EXTRACTION STATUS, UNSPECIFIED EYE: Chronic | ICD-10-CM

## 2020-02-18 DIAGNOSIS — Z98.890 OTHER SPECIFIED POSTPROCEDURAL STATES: Chronic | ICD-10-CM

## 2020-02-18 DIAGNOSIS — M50.20 OTHER CERVICAL DISC DISPLACEMENT, UNSPECIFIED CERVICAL REGION: Chronic | ICD-10-CM

## 2020-02-18 LAB
ALBUMIN SERPL ELPH-MCNC: 4.1 G/DL — SIGNIFICANT CHANGE UP (ref 3.3–5.2)
ALP SERPL-CCNC: 54 U/L — SIGNIFICANT CHANGE UP (ref 40–120)
ALT FLD-CCNC: 21 U/L — SIGNIFICANT CHANGE UP
ANION GAP SERPL CALC-SCNC: 13 MMOL/L — SIGNIFICANT CHANGE UP (ref 5–17)
APTT BLD: 31.9 SEC — SIGNIFICANT CHANGE UP (ref 27.5–36.3)
AST SERPL-CCNC: 27 U/L — SIGNIFICANT CHANGE UP
BASOPHILS # BLD AUTO: 0.03 K/UL — SIGNIFICANT CHANGE UP (ref 0–0.2)
BASOPHILS NFR BLD AUTO: 0.4 % — SIGNIFICANT CHANGE UP (ref 0–2)
BILIRUB SERPL-MCNC: 0.6 MG/DL — SIGNIFICANT CHANGE UP (ref 0.4–2)
BLD GP AB SCN SERPL QL: SIGNIFICANT CHANGE UP
BUN SERPL-MCNC: 17 MG/DL — SIGNIFICANT CHANGE UP (ref 8–20)
CALCIUM SERPL-MCNC: 9.5 MG/DL — SIGNIFICANT CHANGE UP (ref 8.6–10.2)
CHLORIDE SERPL-SCNC: 98 MMOL/L — SIGNIFICANT CHANGE UP (ref 98–107)
CO2 SERPL-SCNC: 25 MMOL/L — SIGNIFICANT CHANGE UP (ref 22–29)
CREAT SERPL-MCNC: 1.17 MG/DL — SIGNIFICANT CHANGE UP (ref 0.5–1.3)
EOSINOPHIL # BLD AUTO: 0.05 K/UL — SIGNIFICANT CHANGE UP (ref 0–0.5)
EOSINOPHIL NFR BLD AUTO: 0.6 % — SIGNIFICANT CHANGE UP (ref 0–6)
GLUCOSE SERPL-MCNC: 104 MG/DL — HIGH (ref 70–99)
HCT VFR BLD CALC: 43.3 % — SIGNIFICANT CHANGE UP (ref 39–50)
HGB BLD-MCNC: 14 G/DL — SIGNIFICANT CHANGE UP (ref 13–17)
IMM GRANULOCYTES NFR BLD AUTO: 0.5 % — SIGNIFICANT CHANGE UP (ref 0–1.5)
INR BLD: 1.15 RATIO — SIGNIFICANT CHANGE UP (ref 0.88–1.16)
LYMPHOCYTES # BLD AUTO: 1.79 K/UL — SIGNIFICANT CHANGE UP (ref 1–3.3)
LYMPHOCYTES # BLD AUTO: 21.5 % — SIGNIFICANT CHANGE UP (ref 13–44)
MCHC RBC-ENTMCNC: 32.3 GM/DL — SIGNIFICANT CHANGE UP (ref 32–36)
MCHC RBC-ENTMCNC: 32.7 PG — SIGNIFICANT CHANGE UP (ref 27–34)
MCV RBC AUTO: 101.2 FL — HIGH (ref 80–100)
MONOCYTES # BLD AUTO: 0.56 K/UL — SIGNIFICANT CHANGE UP (ref 0–0.9)
MONOCYTES NFR BLD AUTO: 6.7 % — SIGNIFICANT CHANGE UP (ref 2–14)
NEUTROPHILS # BLD AUTO: 5.86 K/UL — SIGNIFICANT CHANGE UP (ref 1.8–7.4)
NEUTROPHILS NFR BLD AUTO: 70.3 % — SIGNIFICANT CHANGE UP (ref 43–77)
PLATELET # BLD AUTO: 252 K/UL — SIGNIFICANT CHANGE UP (ref 150–400)
POTASSIUM SERPL-MCNC: 4.6 MMOL/L — SIGNIFICANT CHANGE UP (ref 3.5–5.3)
POTASSIUM SERPL-SCNC: 4.6 MMOL/L — SIGNIFICANT CHANGE UP (ref 3.5–5.3)
PROT SERPL-MCNC: 6.5 G/DL — LOW (ref 6.6–8.7)
PROTHROM AB SERPL-ACNC: 13.1 SEC — HIGH (ref 10–12.9)
RBC # BLD: 4.28 M/UL — SIGNIFICANT CHANGE UP (ref 4.2–5.8)
RBC # FLD: 14.1 % — SIGNIFICANT CHANGE UP (ref 10.3–14.5)
SODIUM SERPL-SCNC: 136 MMOL/L — SIGNIFICANT CHANGE UP (ref 135–145)
WBC # BLD: 8.33 K/UL — SIGNIFICANT CHANGE UP (ref 3.8–10.5)
WBC # FLD AUTO: 8.33 K/UL — SIGNIFICANT CHANGE UP (ref 3.8–10.5)

## 2020-02-18 PROCEDURE — 86901 BLOOD TYPING SEROLOGIC RH(D): CPT

## 2020-02-18 PROCEDURE — 99284 EMERGENCY DEPT VISIT MOD MDM: CPT

## 2020-02-18 PROCEDURE — 80053 COMPREHEN METABOLIC PANEL: CPT

## 2020-02-18 PROCEDURE — 86850 RBC ANTIBODY SCREEN: CPT

## 2020-02-18 PROCEDURE — 36415 COLL VENOUS BLD VENIPUNCTURE: CPT

## 2020-02-18 PROCEDURE — 86900 BLOOD TYPING SEROLOGIC ABO: CPT

## 2020-02-18 PROCEDURE — 99283 EMERGENCY DEPT VISIT LOW MDM: CPT

## 2020-02-18 PROCEDURE — 85610 PROTHROMBIN TIME: CPT

## 2020-02-18 PROCEDURE — 85730 THROMBOPLASTIN TIME PARTIAL: CPT

## 2020-02-18 PROCEDURE — 85027 COMPLETE CBC AUTOMATED: CPT

## 2020-02-18 NOTE — ED PROVIDER NOTE - OBJECTIVE STATEMENT
73 y/o male with PMHx of BPH, CAD, HLD, SA, and Hemorrhoids presents to ED c/o rectal bleeding. Patient states that at 3am he started having rectal bleeding. States he used to take Aspirin and Brilinta but no longer takes them. denies fever. denies HA or neck pain. no chest pain or sob. no abd pain. no n/v/d. no urinary f/u/d. no back pain. no motor or sensory deficits. denies illicit drug use. no recent travel. no rash. no other acute issues symptoms or concerns

## 2020-02-18 NOTE — ED PROVIDER NOTE - CLINICAL SUMMARY MEDICAL DECISION MAKING FREE TEXT BOX
rectal nml no eveidence active bleeding no fissure or fistula no blood thinners hemodynamically stable return precautions given

## 2020-02-18 NOTE — ED ADULT TRIAGE NOTE - CHIEF COMPLAINT QUOTE
Pt with rectal bleeding since 3 am. Patient states it is BRB. Has a h/o of internal hemorrhoid that he has had cauterized in the past. Denies any rectal or abdominal pain.

## 2020-02-18 NOTE — ED STATDOCS - DR. NAME
Call regarding: Patient had here labs done a few months and was told to repeat the A1C in 3 or 4 months.  She is due now.  Can orders be entered in Epic?  Call when orders are entered and with any questions.    Okay to leave detailed voice mail?  Yes    Pharmacy information verified:  No      Clemente

## 2020-02-18 NOTE — ED STATDOCS - PMH
BPH (benign prostatic hyperplasia)    CAD (coronary artery disease)    Hard of hearing, bilateral  wears bilateral hearing aids  History of diverticulosis  on colonoscopy  History of gout    History of hemorrhoids  cauterization ( h/o bleeding due to Plavix )  History of hypertension  no longer on medications  History of psoriasis    History of syncope  2/2019 on the plane to [place], was thoroughly evaluated , off HTN medications, was dehydrated.  Hyperlipidemia    BEV (obstructive sleep apnea)  severe, not using CPAP  Presence of stent in coronary artery  4 stents in LAD 6/2018 now on ASA

## 2020-02-18 NOTE — ED PROVIDER NOTE - PATIENT PORTAL LINK FT
You can access the FollowMyHealth Patient Portal offered by [facility] by registering at the following website: [email or web address]. By joining [employer]’s FollowMyHealth portal, you will also be able to view your health information using other applications (apps) compatible with our system.

## 2020-02-18 NOTE — ED PROVIDER NOTE - CARE PROVIDERS DIRECT ADDRESSES
[email or web address]

## 2020-02-18 NOTE — ED PROVIDER NOTE - CARE PROVIDER_API CALL
Maximo Gotti)  ColonRectal Surgery; Surgery  [address] [facility] [address] [place] [address]  Phone: [phone]  Fax: [phone]  Follow Up Time: 1-3 Days    Evelina Dunaway)  ColonRectal Surgery; Surgery  [address] [place] [address]  Phone: [phone]  Fax: [phone]  Follow Up Time: 1-3 Days

## 2020-02-18 NOTE — ED STATDOCS - PSH
Cervical disc displacement  replaced c7 - and c 6 disc, Cervical fusion 2014  History of cataract surgery  b/l few years ago  History of colonoscopy  2018  History of prostate biopsy  multiple- benign  History of shoulder surgery  right rotator cuff x1, left rotater cuff x 2  S/P cardiac catheterization  5/2018 and 6/2018 s/p 4 stents in LAD

## 2020-02-18 NOTE — ED PROVIDER NOTE - PROVIDER TOKENS
PROVIDER:[TOKEN:[[id number]],FOLLOWUP:[1-3 Days]],PROVIDER:[TOKEN:[[id number]],FOLLOWUP:[1-3 Days]]

## 2020-02-19 ENCOUNTER — APPOINTMENT (OUTPATIENT)
Dept: COLORECTAL SURGERY | Facility: CLINIC | Age: 75
End: 2020-02-19
Payer: MEDICARE

## 2020-02-19 VITALS
BODY MASS INDEX: 31.84 KG/M2 | HEART RATE: 80 BPM | WEIGHT: 215 LBS | SYSTOLIC BLOOD PRESSURE: 130 MMHG | HEIGHT: 69 IN | DIASTOLIC BLOOD PRESSURE: 80 MMHG | RESPIRATION RATE: 14 BRPM

## 2020-02-19 DIAGNOSIS — Z86.39 PERSONAL HISTORY OF OTHER ENDOCRINE, NUTRITIONAL AND METABOLIC DISEASE: ICD-10-CM

## 2020-02-19 DIAGNOSIS — Z82.3 FAMILY HISTORY OF STROKE: ICD-10-CM

## 2020-02-19 DIAGNOSIS — K62.5 HEMORRHAGE OF ANUS AND RECTUM: ICD-10-CM

## 2020-02-19 DIAGNOSIS — Z86.69 PERSONAL HISTORY OF OTHER DISEASES OF THE NERVOUS SYSTEM AND SENSE ORGANS: ICD-10-CM

## 2020-02-19 DIAGNOSIS — Z87.438 PERSONAL HISTORY OF OTHER DISEASES OF MALE GENITAL ORGANS: ICD-10-CM

## 2020-02-19 DIAGNOSIS — Z86.79 PERSONAL HISTORY OF OTHER DISEASES OF THE CIRCULATORY SYSTEM: ICD-10-CM

## 2020-02-19 DIAGNOSIS — Z82.49 FAMILY HISTORY OF ISCHEMIC HEART DISEASE AND OTHER DISEASES OF THE CIRCULATORY SYSTEM: ICD-10-CM

## 2020-02-19 PROCEDURE — 99203 OFFICE O/P NEW LOW 30 MIN: CPT

## 2020-02-19 PROCEDURE — 45300 PROCTOSIGMOIDOSCOPY DX: CPT

## 2020-02-19 RX ORDER — SENNOSIDES 8.6 MG
TABLET ORAL
Refills: 0 | Status: ACTIVE | COMMUNITY

## 2020-02-19 RX ORDER — PRAVASTATIN SODIUM 40 MG/1
40 TABLET ORAL
Qty: 30 | Refills: 0 | Status: ACTIVE | COMMUNITY
Start: 2019-02-22

## 2020-02-19 RX ORDER — TAMSULOSIN HYDROCHLORIDE 0.4 MG/1
0.4 CAPSULE ORAL
Qty: 180 | Refills: 3 | Status: DISCONTINUED | COMMUNITY
Start: 2018-10-24 | End: 2020-02-19

## 2020-02-19 NOTE — PHYSICAL EXAM
[Normal Breath Sounds] : Normal breath sounds [Normal Heart Sounds] : normal heart sounds [Normal Rate and Rhythm] : normal rate and rhythm [Alert] : alert [Oriented to Person] : oriented to person [No Edema] : No edema [Oriented to Place] : oriented to place [Oriented to Time] : oriented to time [Calm] : calm [de-identified] : obese +BS NT/ND [de-identified] : +ROM [de-identified] : NC/AT [de-identified] : intact

## 2020-02-19 NOTE — DIETITIAN INITIAL EVALUATION ADULT. - ETIOLOGY
spoke with dr michaels related to energy intake exceeds expenditure and limited adherence to specific meal plan

## 2020-02-19 NOTE — REVIEW OF SYSTEMS
[As Noted in HPI] : as noted in HPI [Negative] : Endocrine [Chest Pain] : no chest pain [Shortness Of Breath] : no shortness of breath [Easy Bruising] : no tendency for easy bruising [FreeTextEntry9] : shoulder/neck pain [Easy Bleeding] : no tendency for easy bleeding

## 2020-02-19 NOTE — ASSESSMENT
[FreeTextEntry1] : Pleasant 74-year-old obese male who presents with a chief complaint of rectal bleeding.\par \par In 2018 the patient underwent a colonoscopy and was told he had diverticulosis and all was well. That night he had syncopal episode. He was brought to [facility] and was told he had cardiac issues and needed bypass surgery. He was given Plavix and had a massive lower GI bleed. He was transferred to [facility] where multiple stents were placed. Patient was seen by colorectal surgery where a hemorrhoid was supposedly cauterized. Patient was on various antiplatelet medications, which were discontinued one year after stent placement. He remains on a baby aspirin. Patient had a second bout of rectal bleeding and once again hemorrhoid was "cauterized". He did not have any rectal bleeding until tw days ago when he passed a significant amount of painless bright red blood. He went to the emergency room and was told his blood count was normal. He presents today for evaluation. He is passing some dark blood in the stool today.\par \par Inspection of the perianal region reveals some blood on the perianal skin. Sigmoidoscopy to 12 cm reveals some fecal matter and some dark blood. Anoscopy confirms the presence of hemorrhoids but nothing acutely bleeding.\par \par It seems he had two hemorrhoidal bleeds within 2 years and a negative colonoscopy less than two years ago. Since the bleeding has stopped today I would simply observe. If bleeding continues, I would consider a trial of hemorrhoidal banding

## 2020-02-19 NOTE — HISTORY OF PRESENT ILLNESS
[FreeTextEntry1] : 75yo obese WM with colonoscopy performed May 2018 (reportedly nl study). That evening experienced syncopal episode resulting in admission to [facility].  In anticipation of a cardiac intervention patient received an injection of Plavix, which led to significant rectal bleeding.  Transferred to [facility] for cardiac cath/stent placement (4stents placed).  Prior to catheterization [facility] colorectal surgeon "cauterized" IH.\par \par Post cardiac intervention pt was treated w/Brilinta/ASA x1yr.  Experienced second bleed, which was again cauterized.  Brilinta discontinued after one year.  Two days ago w/BM pt experienced additional rectal bleeding, which has continued through today.  Presently dark blood on stool/on wiping.  [facility] colorectal surgeon not available. Presents for evaluation.\par \par Denies lightheadedness/dizziness/CP

## 2020-02-24 ENCOUNTER — APPOINTMENT (OUTPATIENT)
Dept: COLORECTAL SURGERY | Facility: CLINIC | Age: 75
End: 2020-02-24

## 2020-04-23 NOTE — HISTORY OF PRESENT ILLNESS
[FreeTextEntry1] : This is a 74 year old gentleman who presents for follow up after holmium laser enucleation of the prostate on 7/9/19. \par path = 40 grams benign\par \par No complaints today. Denies frequency. Nocturia 2-3x/night (stable. \par Urine stream is strong. No leakage. \par \par Uroflow: Peak flow 24.9 mL/s, Voided volume 372 ml\par PVR 6 mL [Urinary Incontinence] : no urinary incontinence [Urinary Urgency] : no urinary urgency [Urinary Frequency] : no urinary frequency [Post-Void Dribbling] : no post-void dribbling

## 2020-04-23 NOTE — ASSESSMENT
[FreeTextEntry1] : PSA 10/2019 is 1.4 (from PCP)\par \par Plan: \par \par Continue Cialis 5 mg (gets it from PCP). Was taking it for BPH and ED. Taking it every 2-3 days now. Counseled pt that since he had HOLEP he can try taking the Cialis only before intimacy. If urine stream is not affected, can cont using Cialis PRN. If urine stream is affected, can go back on it. \par \par \par RTC in 6 months for follow up with Dr. Alarcon

## 2020-04-27 ENCOUNTER — APPOINTMENT (OUTPATIENT)
Dept: UROLOGY | Facility: CLINIC | Age: 75
End: 2020-04-27

## 2020-08-07 ENCOUNTER — APPOINTMENT (OUTPATIENT)
Dept: COLORECTAL SURGERY | Facility: CLINIC | Age: 75
End: 2020-08-07
Payer: MEDICARE

## 2020-08-07 PROCEDURE — 45300 PROCTOSIGMOIDOSCOPY DX: CPT

## 2020-08-07 PROCEDURE — 99214 OFFICE O/P EST MOD 30 MIN: CPT | Mod: 25

## 2020-08-07 NOTE — HISTORY OF PRESENT ILLNESS
[FreeTextEntry1] : 75-year-old obese male whom I have seen in the past for GI bleeding.  Approximately 2 years ago he had a major GI bleed after being placed on anticoagulants for a cardiac event.\par \par I saw him in February 2020 with some additional rectal bleeding. He has been well until the last few days when he developed some constipation. He had significant bright red rectal bleeding yesterday and today is passing a small amount of dark blood. He is not lightheaded or dizzy. He takes one aspirin daily. He has had cardiac stents placed in the past.\par \par Last colonoscopy was 2 years ago.\par \par Inspection of the anorectal area is unremarkable. On digital examination no mass is palpable. Limited sigmoidoscopy to 40 cm reveals some dark blood and some brown stool. No lesion is seen in the rectum. On anoscopy his hemorrhoids do not appear irritated and I do not see evidence of recent or ongoing bleeding.\par \par The patient is known to have diverticulosis. I believe that he had another small diverticular bleed yesterday. I asked him to drink a lot of fluid and start a fiber supplement. I want him to follow-up with GI. I advised him to come to the emergency room if bleeding recurs and is significant.

## 2020-08-12 ENCOUNTER — APPOINTMENT (OUTPATIENT)
Dept: COLORECTAL SURGERY | Facility: CLINIC | Age: 75
End: 2020-08-12

## 2020-08-18 ENCOUNTER — APPOINTMENT (OUTPATIENT)
Dept: COLORECTAL SURGERY | Facility: CLINIC | Age: 75
End: 2020-08-18
Payer: MEDICARE

## 2020-08-18 VITALS
BODY MASS INDEX: 32.58 KG/M2 | TEMPERATURE: 97.8 F | SYSTOLIC BLOOD PRESSURE: 144 MMHG | HEIGHT: 69 IN | DIASTOLIC BLOOD PRESSURE: 93 MMHG | HEART RATE: 81 BPM | RESPIRATION RATE: 14 BRPM | WEIGHT: 220 LBS

## 2020-08-18 PROCEDURE — 99204 OFFICE O/P NEW MOD 45 MIN: CPT

## 2020-08-19 VITALS
DIASTOLIC BLOOD PRESSURE: 93 MMHG | HEIGHT: 69 IN | RESPIRATION RATE: 14 BRPM | HEART RATE: 81 BPM | TEMPERATURE: 97.8 F | BODY MASS INDEX: 32.58 KG/M2 | WEIGHT: 220 LBS | SYSTOLIC BLOOD PRESSURE: 144 MMHG

## 2020-08-20 DIAGNOSIS — Z01.818 ENCOUNTER FOR OTHER PREPROCEDURAL EXAMINATION: ICD-10-CM

## 2020-08-20 NOTE — PHYSICAL EXAM
[Abdomen Masses] : No abdominal masses [Abdomen Tenderness] : ~T No ~M abdominal tenderness [JVD] : no jugular venous distention  [Tender] : nontender [Normal Breath Sounds] : Normal breath sounds [Normal Heart Sounds] : normal heart sounds [Normal Rate and Rhythm] : normal rate and rhythm [No Rash or Lesion] : No rash or lesion [Alert] : alert [Oriented to Place] : oriented to place [Oriented to Time] : oriented to time [Oriented to Person] : oriented to person [Calm] : calm [de-identified] : Obese [de-identified] : Looks well in no distress, of stated age. [de-identified] : pupils equal reactive to light normocephalic atraumatic. [de-identified] : moves all 4 extremities appropriately with 5 over 5 strength

## 2020-08-20 NOTE — HISTORY OF PRESENT ILLNESS
[FreeTextEntry1] : Consultation requested by Dr. Ashish garcia for gastrointestinal bleeding. 75-year-old who has had intermittent episodes of lower GI bleeding thought to be from hemorrhoids but could be from diverticular disease. Patient reports having dark blood with clots with bowel movements for the last several weeks denies any abdominal pain fevers or chills

## 2020-08-20 NOTE — CONSULT LETTER
[Consult Letter:] : I had the pleasure of evaluating your patient, [unfilled]. [Dear  ___] : Dear  [unfilled], [Consult Closing:] : Thank you very much for allowing me to participate in the care of this patient.  If you have any questions, please do not hesitate to contact me. [Please see my note below.] : Please see my note below. [Sincerely,] : Sincerely, [FreeTextEntry3] : Margarito Wright M.D. FACS, FASCRS

## 2020-08-20 NOTE — ASSESSMENT
[FreeTextEntry1] : 75-year-old with lower GI bleeding. Recommend CT angiogram, Recommend colonoscopy. Risks and benefits of colonoscopy have been discussed which include but not limited to bleeding, perforation, missing a cancer or polyp occurring 5%.\par

## 2020-08-20 NOTE — PROCEDURE
[FreeTextEntry1] : Anoscopy was performed, demonstrating internal/external hemorrhoids.Dark coagulated blood with clots is seen in the rectum although there are multiple hemorrhoids that do not see any active hemorrhage that her bleeding I feel that the blood is more proximal

## 2020-08-21 ENCOUNTER — APPOINTMENT (OUTPATIENT)
Dept: DISASTER EMERGENCY | Facility: CLINIC | Age: 75
End: 2020-08-21

## 2020-08-21 ENCOUNTER — OUTPATIENT (OUTPATIENT)
Dept: OUTPATIENT SERVICES | Facility: HOSPITAL | Age: 75
LOS: 1 days | End: 2020-08-21
Payer: MEDICARE

## 2020-08-21 ENCOUNTER — RESULT REVIEW (OUTPATIENT)
Age: 75
End: 2020-08-21

## 2020-08-21 VITALS
OXYGEN SATURATION: 97 % | RESPIRATION RATE: 18 BRPM | HEART RATE: 76 BPM | DIASTOLIC BLOOD PRESSURE: 91 MMHG | TEMPERATURE: 98 F | WEIGHT: 222.67 LBS | HEIGHT: 69 IN | SYSTOLIC BLOOD PRESSURE: 134 MMHG

## 2020-08-21 DIAGNOSIS — Z98.890 OTHER SPECIFIED POSTPROCEDURAL STATES: Chronic | ICD-10-CM

## 2020-08-21 DIAGNOSIS — Z01.818 ENCOUNTER FOR OTHER PREPROCEDURAL EXAMINATION: ICD-10-CM

## 2020-08-21 DIAGNOSIS — M50.20 OTHER CERVICAL DISC DISPLACEMENT, UNSPECIFIED CERVICAL REGION: Chronic | ICD-10-CM

## 2020-08-21 DIAGNOSIS — K62.5 HEMORRHAGE OF ANUS AND RECTUM: ICD-10-CM

## 2020-08-21 DIAGNOSIS — Z98.49 CATARACT EXTRACTION STATUS, UNSPECIFIED EYE: Chronic | ICD-10-CM

## 2020-08-21 LAB
ANION GAP SERPL CALC-SCNC: 5 MMOL/L — SIGNIFICANT CHANGE UP (ref 5–17)
APTT BLD: 26.3 SEC — LOW (ref 27.5–35.5)
BUN SERPL-MCNC: 17 MG/DL — SIGNIFICANT CHANGE UP (ref 7–23)
CALCIUM SERPL-MCNC: 9.4 MG/DL — SIGNIFICANT CHANGE UP (ref 8.5–10.1)
CHLORIDE SERPL-SCNC: 105 MMOL/L — SIGNIFICANT CHANGE UP (ref 96–108)
CO2 SERPL-SCNC: 29 MMOL/L — SIGNIFICANT CHANGE UP (ref 22–31)
CREAT SERPL-MCNC: 1.38 MG/DL — HIGH (ref 0.5–1.3)
GLUCOSE SERPL-MCNC: 97 MG/DL — SIGNIFICANT CHANGE UP (ref 70–99)
HCT VFR BLD CALC: 39.9 % — SIGNIFICANT CHANGE UP (ref 39–50)
HGB BLD-MCNC: 12.5 G/DL — LOW (ref 13–17)
INR BLD: 1.1 RATIO — SIGNIFICANT CHANGE UP (ref 0.88–1.16)
MCHC RBC-ENTMCNC: 29.2 PG — SIGNIFICANT CHANGE UP (ref 27–34)
MCHC RBC-ENTMCNC: 31.3 GM/DL — LOW (ref 32–36)
MCV RBC AUTO: 93.2 FL — SIGNIFICANT CHANGE UP (ref 80–100)
PLATELET # BLD AUTO: 290 K/UL — SIGNIFICANT CHANGE UP (ref 150–400)
POTASSIUM SERPL-MCNC: 3.8 MMOL/L — SIGNIFICANT CHANGE UP (ref 3.5–5.3)
POTASSIUM SERPL-SCNC: 3.8 MMOL/L — SIGNIFICANT CHANGE UP (ref 3.5–5.3)
PROTHROM AB SERPL-ACNC: 12.7 SEC — SIGNIFICANT CHANGE UP (ref 10.6–13.6)
RBC # BLD: 4.28 M/UL — SIGNIFICANT CHANGE UP (ref 4.2–5.8)
RBC # FLD: 18.3 % — HIGH (ref 10.3–14.5)
SODIUM SERPL-SCNC: 139 MMOL/L — SIGNIFICANT CHANGE UP (ref 135–145)
WBC # BLD: 6.34 K/UL — SIGNIFICANT CHANGE UP (ref 3.8–10.5)
WBC # FLD AUTO: 6.34 K/UL — SIGNIFICANT CHANGE UP (ref 3.8–10.5)

## 2020-08-21 PROCEDURE — G0463: CPT | Mod: 25

## 2020-08-21 PROCEDURE — 85730 THROMBOPLASTIN TIME PARTIAL: CPT

## 2020-08-21 PROCEDURE — 36415 COLL VENOUS BLD VENIPUNCTURE: CPT

## 2020-08-21 PROCEDURE — 80048 BASIC METABOLIC PNL TOTAL CA: CPT

## 2020-08-21 PROCEDURE — 85027 COMPLETE CBC AUTOMATED: CPT

## 2020-08-21 PROCEDURE — 71046 X-RAY EXAM CHEST 2 VIEWS: CPT | Mod: 26

## 2020-08-21 PROCEDURE — 85610 PROTHROMBIN TIME: CPT

## 2020-08-21 PROCEDURE — 71046 X-RAY EXAM CHEST 2 VIEWS: CPT

## 2020-08-21 PROCEDURE — 93005 ELECTROCARDIOGRAM TRACING: CPT

## 2020-08-21 PROCEDURE — 93010 ELECTROCARDIOGRAM REPORT: CPT

## 2020-08-21 RX ORDER — ASPIRIN/CALCIUM CARB/MAGNESIUM 324 MG
1 TABLET ORAL
Qty: 0 | Refills: 0 | DISCHARGE

## 2020-08-21 RX ORDER — UBIDECARENONE 100 MG
1 CAPSULE ORAL
Qty: 0 | Refills: 0 | DISCHARGE

## 2020-08-21 NOTE — H&P PST ADULT - HISTORY OF PRESENT ILLNESS
This is a 76 y/o male with a PMH of CAD, S/P 4 cardiac stents, gout, BEV (does not use CPAP), This is a 76 y/o male with a PMH of CAD, S/P 4 cardiac stents, gout, BEV (does not use CPAP), who reports he had a few episodes of rectal bleeding. He reports he was told he had hemorrhoids a few years ago. His last colonoscopy was in 2018 which was negative at that time. Denies any N/V/D, abdominal pain, SOB, chest pain, palpitations or fevers. Now scheduled for a colonoscopy.

## 2020-08-21 NOTE — H&P PST ADULT - NSICDXFAMILYHX_GEN_ALL_CORE_FT
FAMILY HISTORY:  Family history of essential hypertension FAMILY HISTORY:  Family history of essential hypertension, father  FH: myocardial infarction, 1989 at age of 78

## 2020-08-21 NOTE — H&P PST ADULT - NSICDXPASTSURGICALHX_GEN_ALL_CORE_FT
PAST SURGICAL HISTORY:  Cervical disc displacement replaced c7 - and c 6 disc, Cervical fusion 2014    History of cataract surgery b/l few years ago    History of colonoscopy 2018    History of prostate biopsy multiple- benign    History of shoulder surgery right rotator cuff x1, left rotater cuff x 2    S/P cardiac catheterization 5/2018 and 6/2018 s/p 4 stents in LAD

## 2020-08-21 NOTE — H&P PST ADULT - NSICDXPASTMEDICALHX_GEN_ALL_CORE_FT
PAST MEDICAL HISTORY:  BPH (benign prostatic hyperplasia)     CAD (coronary artery disease)     Hard of hearing, bilateral wears bilateral hearing aids    History of diverticulosis on colonoscopy    History of gout     History of hemorrhoids cauterization ( h/o bleeding due to Plavix )    History of hypertension no longer on medications    History of psoriasis     History of syncope 2/2019 on the plane to [place], was thoroughly evaluated , off HTN medications, was dehydrated.    Hyperlipidemia     BEV (obstructive sleep apnea) severe, not using CPAP    Presence of stent in coronary artery 4 stents in LAD 6/2018 . Off PAST MEDICAL HISTORY:  BPH (benign prostatic hyperplasia)     CAD (coronary artery disease)     Hard of hearing, bilateral wears bilateral hearing aids    History of diverticulitis     History of diverticulosis on colonoscopy    History of gout     History of hemorrhoids cauterization ( h/o bleeding due to Plavix )    History of hypertension no longer on medications    History of psoriasis     History of syncope 2/2019 on the plane to [place], was thoroughly evaluated , off HTN medications, was dehydrated.    Hyperlipidemia     BEV (obstructive sleep apnea) severe, not using CPAP    Presence of stent in coronary artery 4 stents in LAD 6/2018 . Off

## 2020-08-21 NOTE — H&P PST ADULT - ASSESSMENT
This is a This is a 74 y/o male with rectal bleeding who is scheduled for a colonoscopy    Plan    1. NPO after midnight  2. Patient instructed to follow instructions from Dr. Dunaway for bowel prep and day of procedure medications  3. Pt reports he had COVID 19 testing this morning at [address]

## 2020-08-22 DIAGNOSIS — Z01.818 ENCOUNTER FOR OTHER PREPROCEDURAL EXAMINATION: ICD-10-CM

## 2020-08-22 DIAGNOSIS — K62.5 HEMORRHAGE OF ANUS AND RECTUM: ICD-10-CM

## 2020-08-22 LAB — SARS-COV-2 N GENE NPH QL NAA+PROBE: NOT DETECTED

## 2020-08-24 ENCOUNTER — APPOINTMENT (OUTPATIENT)
Dept: COLORECTAL SURGERY | Facility: HOSPITAL | Age: 75
End: 2020-08-24
Payer: MEDICARE

## 2020-08-24 ENCOUNTER — OUTPATIENT (OUTPATIENT)
Dept: OUTPATIENT SERVICES | Facility: HOSPITAL | Age: 75
LOS: 1 days | Discharge: HOME | End: 2020-08-24
Payer: MEDICARE

## 2020-08-24 ENCOUNTER — RESULT REVIEW (OUTPATIENT)
Age: 75
End: 2020-08-24

## 2020-08-24 VITALS
OXYGEN SATURATION: 98 % | HEIGHT: 69 IN | WEIGHT: 220.02 LBS | TEMPERATURE: 97 F | HEART RATE: 92 BPM | DIASTOLIC BLOOD PRESSURE: 111 MMHG | RESPIRATION RATE: 28 BRPM | SYSTOLIC BLOOD PRESSURE: 156 MMHG

## 2020-08-24 DIAGNOSIS — Z98.890 OTHER SPECIFIED POSTPROCEDURAL STATES: Chronic | ICD-10-CM

## 2020-08-24 DIAGNOSIS — M50.20 OTHER CERVICAL DISC DISPLACEMENT, UNSPECIFIED CERVICAL REGION: Chronic | ICD-10-CM

## 2020-08-24 DIAGNOSIS — Z98.49 CATARACT EXTRACTION STATUS, UNSPECIFIED EYE: Chronic | ICD-10-CM

## 2020-08-24 DIAGNOSIS — K62.5 HEMORRHAGE OF ANUS AND RECTUM: ICD-10-CM

## 2020-08-24 PROCEDURE — 45378 DIAGNOSTIC COLONOSCOPY: CPT

## 2020-08-24 PROCEDURE — 88305 TISSUE EXAM BY PATHOLOGIST: CPT

## 2020-08-24 PROCEDURE — 88305 TISSUE EXAM BY PATHOLOGIST: CPT | Mod: 26

## 2020-08-24 NOTE — ASU PATIENT PROFILE, ADULT - PMH
[facility] Medicine Services  DISCHARGE SUMMARY     Date of Admission: 7/4/2019  Date of Discharge:  7/8/2019  Primary Care Physician: Delfina Pereira DO    Presenting Problem/History of Present Illness:  Closed fracture right wrist, closed fracture multiple pubic rami right after fall     Discharge Diagnoses:  Active Hospital Problems    Diagnosis   • **Right greater trochanteric avulsion periprosthetic, closed, minimally displaced   • Closed fracture of right wrist   • Closed fracture of multiple pubic rami, right, initial encounter (CMS/[facility])   • Fall   • Impaired gait and mobility   • Acute right hip pain   • Hypothyroidism   • Hypertension     Chief Complaint on Day of Discharge: Right hip pain improved  History of Present Illness on Day of Discharge:   Lying in bed.  No family in room.  She reports less hip pain today.  She was able to ambulate 20 feet x 2 with rolling walker and assistance of physical therapy yesterday.  She denies nausea, vomiting or abdominal pain.  She had a bowel movement.  She denies chest pain, palpitations or shortness of breath.  No oxygen in use.  Ready for skilled facility today.    Consults: Dr. Pollo Perez, orthopedics    Procedures Performed: None    Pertinent Test Results:   Laboratory Data:    Results from last 7 days   Lab Units 07/07/19  0738 07/06/19  0501 07/05/19  0018   WBC 10*3/mm3 9.23 8.80 14.80*   HEMOGLOBIN g/dL 10.2* 10.0* 12.1   HEMATOCRIT % 32.0* 31.4* 37.2   PLATELETS 10*3/mm3 167 169 201        Results from last 7 days   Lab Units 07/06/19  0501 07/05/19  0017   SODIUM mmol/L 139 140   POTASSIUM mmol/L 4.3 5.0   CHLORIDE mmol/L 108 108   CO2 mmol/L 27.0 25.0   BUN mg/dL 23* 36*   CREATININE mg/dL 1.01 1.24   CALCIUM mg/dL 8.6 9.5   BILIRUBIN mg/dL  --  0.6   ALK PHOS U/L  --  95   ALT (SGPT) U/L  --  22   AST (SGOT) U/L  --  46*   GLUCOSE mg/dL 99 110*     Lab Results (all)     Procedure Component Value Units Date/Time  BPH (benign prostatic hyperplasia)    CAD (coronary artery disease)    Hard of hearing, bilateral  wears bilateral hearing aids  History of diverticulitis    History of diverticulosis  on colonoscopy  History of gout    History of hemorrhoids  cauterization ( h/o bleeding due to Plavix )  History of hypertension  no longer on medications  History of psoriasis    History of syncope  2/2019 on the plane to [place], was thoroughly evaluated , off HTN medications, was dehydrated.  Hyperlipidemia    BEV (obstructive sleep apnea)  severe, not using CPAP  Presence of stent in coronary artery  4 stents in LAD 6/2018 . Off    CBC (No Diff) [[id number]]  (Abnormal) Collected:  07/07/19 0738    Specimen:  Blood Updated:  07/07/19 0810     WBC 9.23 10*3/mm3      RBC 3.40 10*6/mm3      Hemoglobin 10.2 g/dL      Hematocrit 32.0 %      MCV 94.1 fL      MCH 30.0 pg      MCHC 31.9 g/dL      RDW 12.6 %      RDW-SD 43.2 fl      MPV 9.8 fL      Platelets 167 10*3/mm3     POC Glucose Once [[id number]]  (Normal) Collected:  07/06/19 1341    Specimen:  Blood Updated:  07/06/19 1352     Glucose 130 mg/dL      Comment: : [id number] Hadley Angel ID: [id number]       CBC & Differential [[id number]] Collected:  07/06/19 0501    Specimen:  Blood Updated:  07/06/19 0629    Narrative:       The following orders were created for panel order CBC & Differential.  Procedure                               Abnormality         Status                     ---------                               -----------         ------                     CBC Auto Differential[[id number]]        Abnormal            Final result                 Please view results for these tests on the individual orders.    CBC Auto Differential [[id number]]  (Abnormal) Collected:  07/06/19 0501    Specimen:  Blood Updated:  07/06/19 0629     WBC 8.80 10*3/mm3      RBC 3.36 10*6/mm3      Hemoglobin 10.0 g/dL      Hematocrit 31.4 %      MCV 93.5 fL      MCH 29.8 pg      MCHC 31.8 g/dL      RDW 12.8 %      RDW-SD 44.1 fl      MPV 9.6 fL      Platelets 169 10*3/mm3      Neutrophil % 65.0 %      Lymphocyte % 20.7 %      Monocyte % 11.3 %      Eosinophil % 2.4 %      Basophil % 0.3 %      Immature Grans % 0.3 %      Neutrophils, Absolute 5.72 10*3/mm3      Lymphocytes, Absolute 1.82 10*3/mm3      Monocytes, Absolute 0.99 10*3/mm3      Eosinophils, Absolute 0.21 10*3/mm3      Basophils, Absolute 0.03 10*3/mm3      Immature Grans, Absolute 0.03 10*3/mm3      nRBC 0.0 /100 WBC     Basic Metabolic Panel [[id number]]  (Abnormal) Collected:  07/06/19 0501    Specimen:  Blood Updated:  07/06/19 0552      Glucose 99 mg/dL      BUN 23 mg/dL      Creatinine 1.01 mg/dL      Sodium 139 mmol/L      Potassium 4.3 mmol/L      Chloride 108 mmol/L      CO2 27.0 mmol/L      Calcium 8.6 mg/dL      eGFR Non African Amer 51 mL/min/1.73      BUN/Creatinine Ratio 22.8     Anion Gap 4.0 mmol/L     Narrative:       GFR Normal >60  Chronic Kidney Disease <60  Kidney Failure <15    Protime-INR [[id number]]  (Normal) Collected:  07/05/19 0017    Specimen:  Blood Updated:  07/05/19 0039     Protime 12.8 Seconds      INR 0.94    aPTT [[id number]]  (Normal) Collected:  07/05/19 0017    Specimen:  Blood Updated:  07/05/19 0039     PTT 28.1 seconds     Comprehensive Metabolic Panel [[id number]]  (Abnormal) Collected:  07/05/19 0017    Specimen:  Blood Updated:  07/05/19 0034     Glucose 110 mg/dL      BUN 36 mg/dL      Creatinine 1.24 mg/dL      Sodium 140 mmol/L      Potassium 5.0 mmol/L      Chloride 108 mmol/L      CO2 25.0 mmol/L      Calcium 9.5 mg/dL      Total Protein 7.2 g/dL      Albumin 4.30 g/dL      ALT (SGPT) 22 U/L      AST (SGOT) 46 U/L      Alkaline Phosphatase 95 U/L      Total Bilirubin 0.6 mg/dL      eGFR Non African Amer 41 mL/min/1.73      Globulin 2.9 gm/dL      A/G Ratio 1.5 g/dL      BUN/Creatinine Ratio 29.0     Anion Gap 7.0 mmol/L     Narrative:       GFR Normal >60  Chronic Kidney Disease <60  Kidney Failure <15    CBC & Differential [[id number]] Collected:  07/05/19 0018    Specimen:  Blood Updated:  07/05/19 0024    Narrative:       The following orders were created for panel order CBC & Differential.  Procedure                               Abnormality         Status                     ---------                               -----------         ------                     CBC Auto Differential[[id number]]        Abnormal            Final result                 Please view results for these tests on the individual orders.    CBC Auto Differential [[id number]]  (Abnormal) Collected:  07/05/19 0018    Specimen:  Blood  Updated:  07/05/19 0024     WBC 14.80 10*3/mm3      RBC 4.05 10*6/mm3      Hemoglobin 12.1 g/dL      Hematocrit 37.2 %      MCV 91.9 fL      MCH 29.9 pg      MCHC 32.5 g/dL      RDW 12.7 %      RDW-SD 42.8 fl      MPV 9.3 fL      Platelets 201 10*3/mm3      Neutrophil % 75.4 %      Lymphocyte % 14.7 %      Monocyte % 8.0 %      Eosinophil % 0.9 %      Basophil % 0.3 %      Immature Grans % 0.7 %      Neutrophils, Absolute 11.17 10*3/mm3      Lymphocytes, Absolute 2.17 10*3/mm3      Monocytes, Absolute 1.18 10*3/mm3      Eosinophils, Absolute 0.13 10*3/mm3      Basophils, Absolute 0.05 10*3/mm3      Immature Grans, Absolute 0.10 10*3/mm3      nRBC 0.0 /100 WBC         Imaging Results (all)     Procedure Component Value Units Date/Time    XR Hand 3+ View Right [[id number]] Collected:  07/05/19 0740     Updated:  07/05/19 0744    Narrative:       EXAMINATION:  XR HAND 3+ VW RIGHT-  7/5/2019 12:04 AM CDT     HISTORY: rt hand pain, fall      COMPARISON: No comparison study.     TECHNIQUE: 3 views: AP lateral and oblique projection imaging     FINDINGS:      There is a transversely oriented fracture of the distal radius, mildly  displaced.     There is a mildly displaced ulnar styloid fracture.     There is osteoporosis.     There are degenerative changes in the distal interphalangeal joints  particularly.     There is no additional fractures of the right hand.       Impression:       1. Distal radial and ulnar styloid fractures.  2. No additional fracture/acute osseous abnormality.  This report was finalized on 07/05/2019 07:41 by Dr. Christian Kathleen MD.    XR Wrist 3+ View Right [[id number]] Collected:  07/05/19 0739     Updated:  07/05/19 0743    Narrative:       EXAMINATION:  XR WRIST 3+ VW RIGHT-  7/5/2019 12:04 AM CDT     HISTORY: rt wrist pain, fall      COMPARISON: No comparison study.     TECHNIQUE: 3 views: AP, lateral and oblique projection imaging     FINDINGS:      There is distal radial fracture, transversely  oriented, mildly displaced  with intra-articular extension centrally.     There is a mildly displaced ulnar styloid fracture.     There is osteoporosis.       Impression:       1. Distal radial and ulnar styloid fractures.  This report was finalized on 07/05/2019 07:40 by Dr. Christian Kathleen MD.    XR Tibia Fibula 2 View Right [[id number]] Collected:  07/05/19 0738     Updated:  07/05/19 0742    Narrative:       EXAMINATION:  XR TIBIA FIBULA 2 VW RIGHT-  7/5/2019 12:05 AM CDT     HISTORY: pain, swellling to lateral leg, brushing; S72.001A-Fracture of  unspecified part of neck of right femur, initial encounter for closed  fracture; W19.XXXA-Unspecified fall, initial encounter;  S52.501A-Unspecified fracture of the lower end of right radius, initial  encounter for closed fracture; S52.601A-Unspecified fracture of lower  end of right ulna, initial encounter for closed fracture; Z79.01-Long      COMPARISON: No comparison study.     TECHNIQUE: 2 views: AP and lateral projection imaging. 4 images     FINDINGS: The knee and ankle joints are maintained without fracture.       Impression:       1. No acute osseous abnormality.  This report was finalized on 07/05/2019 07:39 by Dr. Christian Kathleen MD.    XR Knee 3 View Right [[id number]] Collected:  07/05/19 0735     Updated:  07/05/19 0741    Narrative:       EXAMINATION:  XR KNEE 3 VW RIGHT-  7/5/2019 12:05 AM CDT     HISTORY: rt knee pain; S72.001A-Fracture of unspecified part of neck of  right femur, initial encounter for closed fracture; W19.XXXA-Unspecified  fall, initial encounter; S52.501A-Unspecified fracture of the lower end  of right radius, initial encounter for closed fracture;  S52.601A-Unspecified fracture of lower end of right ulna, initial  encounter for closed fracture; Z79.01-Long term (current) use of anti      COMPARISON: No comparison study.     TECHNIQUE: 3 views: AP lateral and oblique projection imaging     FINDINGS:      Patella femoral and tibial  relationships are appropriate, without  fracture.     There are tricompartment osteoarthritic changes identified with  osteophyte formation and joint space narrowing. Chondrocalcinosis  involving the menisci observed.     Vascular calcifications observed.     There is no significant joint effusion.       Impression:       1. No acute osseous abnormality.  This report was finalized on 07/05/2019 07:38 by Dr. Christian Kathleen MD.    XR Hip With or Without Pelvis 2 - 3 View Right [[id number]] Collected:  07/05/19 0731     Updated:  07/05/19 0741    Narrative:       EXAMINATION:  XR HIP W OR WO PELVIS 2-3 VIEW RIGHT-  7/5/2019 12:04 AM  CDT     HISTORY: rt  hip pain, fall      COMPARISON: 02/26/2016     TECHNIQUE: 3 views: AP pelvis, AP and lateral right hip     FINDINGS:      The right femoral prosthesis well-positioned without hardware  complications.     There is no fracture or dislocation.     There is deformity of the right superior and inferior pubic rami  compatible with fractures, age-indeterminate, question subacute/chronic.  Correlate with clinical findings.     The left hip joint is maintained. Advanced degenerative changes in the  lower lumbar spine.       Impression:       1. Changes from right hip arthroplasty without hardware complication.  2. Deformity of the right superior and inferior pubic rami compatible  with fractures, age-indeterminate, question chronic/subacute. Correlate  with patient presentation.  This report was finalized on 07/05/2019 07:35 by Dr. Christian Kathleen MD.        Hospital Course  Patient is a 89 y.o. female presented to [facility] 7/4/2019 after a fall with right hip and right arm pain.  Patient reported she was walking out of her house, carrying a chair down steps to see the fireworks.  As she got to the bottom step, she missed the step, slipped and fell landing onto the grass.  She landed on her right hip with her right arm outstretched.  She denied loss of consciousness,  blurred or double vision, syncopal episode prior to fall.  Patient reported she just missed the bottom step and fell.  She experienced immediate pain and was unable to stand.  She presented to the emergency room where x-ray noted right distal radius and ulna fracture.  A soft splint cast was placed.  There was concern for fracture of the greater trochanteric right hip.  She has a history of previous right hip replacement with hardware in place.  Hardware appeared to be intact on preliminary imaging and results.  She received morphine IV and Zofran in the emergency room.    She was admitted to the orthopedic floor with distal radial and ulnar fractures and concern for right hip fracture.  Official reading of right hip x-ray reported changes from right hip arthroplasty without hardware complications.  Deformity of the right superior and inferior pubic rami compatible with fractures, age-indeterminate.  Patient denied previous fall history and reported continued severe right hip pain with attempted weightbearing.  Orthopedics consulted and she was seen by Dr. Perez with impression right greater trochanter evulsion fracture periprosthetic, closed, minimally displaced, right displaced and shortened superior pubic rami fractures and distal radius fracture right.  Orthopedics discussed with patient and son nonoperative treatment for fractures in the right hip.  Patient was allowed weightbearing as tolerated right lower extremity.  In addition, orthopedics discussed right distal radius fracture was in good position and nonoperative treatment recommended.  Orthopedics will follow up with x-rays in 2 weeks, 7/22/2019.  Nonweightbearing right wrist.     Physical therapy and occupational therapy consulted.  Initially, patient had difficulty with weightbearing right lower extremity.  She continued to work with physical therapy and was able to ambulate 25 feet x 2 with assistance of rolling walker and physical therapy.  She  "remains weightbearing as tolerated right lower extremity.  Nonweightbearing right upper extremity with soft splint cast right upper extremity.       consulted for discharge planning as patient and son desire skilled nursing facility.  [facility] requested by patient.  Bed offered at [facility] on 7/8/19.    On 7/8/2019 she is medically stable for discharge to [facility] for ongoing physical therapy and occupational therapy prior to discharge home.  She remains weightbearing as tolerated right lower extremity and nonweightbearing right upper extremity with right upper extremity in soft splint cast.  She has ambulated with physical therapy 25 feet x 2 using rolling walker.  Patient denies nausea, vomiting or abdominal pain.  She denies chest pain, palpitations or shortness of breath.  No oxygen required.  She had a bowel movement.  Home medications resumed.  She will follow-up with her primary care provider Delfina Pereira after discharge from [facility].  Follow-up with orthopedics, Rip Mustafa PA-C on 7/22/2019.    Physical Exam on Discharge:  /55 (BP Location: Left arm, Patient Position: Lying)   Pulse 94   Temp 98.5 °F (36.9 °C) (Oral)   Resp 17   Ht 157.5 cm (62\")   Wt 57.2 kg (126 lb 3 oz)   SpO2 91%   BMI 23.08 kg/m²   Physical Exam  Constitutional: She is oriented to person, place, and time. She appears well-developed and well-nourished.   HENT:   Head: Atraumatic.   Eyes: EOM are normal. Pupils are equal, round, and reactive to light.   Neck: Normal range of motion. Neck supple.   Cardiovascular: Normal rate, regular rhythm, normal heart sounds and intact distal pulses. Exam reveals no gallop and no friction rub.   No murmur heard.  Pulmonary/Chest: Effort normal and breath sounds normal. She has no wheezes. She has no rales.   Oxygen off.  Abdominal: Soft. Bowel sounds are normal. She exhibits no distension.   Genitourinary:   Genitourinary " Comments: Voiding.   Musculoskeletal: She exhibits tenderness (Right hip, right lower extremity, right upper extremity).   Right upper extremity with Ace cast  Neurological: She is alert and oriented to person, place, and time.   Skin: Skin is warm and dry.   Large bruising region right lower extremity  Psychiatric: She has a normal mood and affect. Her behavior is normal. Judgment and thought content normal.     Condition on Discharge: Stable    [facility]position: [facility]    Discharge Diet:   Diet Instructions     Advance Diet As Tolerated        As tolerated    Activity at Discharge:   Activity Instructions     Other Activity Instructions      1.  Weightbearing as tolerated right lower extremity.  2.  Nonweightbearing right upper extremity.       Weightbearing as tolerated right lower extremity.  Nonweightbearing right wrist    Discharge Care Plan / Instructions:   1.  Weightbearing as tolerated right lower extremity.  2.  Nonweightbearing right wrist.  3.  Elevate right upper extremity on pillow.  4.  Follow-up with Dr. Pereira 1 week after discharge from SNF.  5.  Follow-up with Rip Mustafa PA-C on 7/22/2019.  6.  For recurrent falls seek medical attention.    Discharge Medications:     Discharge Medications      New Medications      Instructions Start Date   acetaminophen 325 MG tablet  Commonly known as:  TYLENOL   650 mg, Oral, Every 4 Hours PRN      oxyCODONE-acetaminophen 5-325 MG per tablet  Commonly known as:  PERCOCET   1 tablet, Oral, Every 6 Hours PRN         Continue These Medications      Instructions Start Date   alendronate 70 MG tablet  Commonly known as:  FOSAMAX   70 mg, Oral, Every 7 Days, Thursday      amLODIPine 10 MG tablet  Commonly known as:  NORVASC   10 mg, Oral, Daily      aspirin 81 MG tablet   81 mg, Oral, Daily      CALCIUM 600/VITAMIN D PO   1 tablet, Oral, Daily      clopidogrel 75 MG tablet  Commonly known as:  PLAVIX   75 mg, Oral, Every 24 Hours      COMPLETE  MULTIVITAMIN/MINERAL PO   1 tablet, Oral, Daily      cycloSPORINE 0.05 % ophthalmic emulsion  Commonly known as:  RESTASIS   1 drop, Both Eyes, Every 12 Hours      Glucosamine-Chondroitin-Vit D3 5900-2314-050 MG-MG-UNIT pack   1 tablet, Oral, 2 Times Daily      levothyroxine 125 MCG tablet  Commonly known as:  SYNTHROID, LEVOTHROID   125 mcg, Oral, Daily      TEKTURNA 300 MG tablet  Generic drug:  aliskiren   300 mg, Oral, Daily         Stop These Medications    CloNIDine 0.1 MG tablet  Commonly known as:  CATAPRES          Follow-up Appointments:    Contact information for follow-up providers     Delfina Pereira, DO Follow up.    Specialty:  Family Medicine  Why:  After discharge from [facility].  Contact information:  [address]  [address]  [facility] [address]  [phone]             Rip Mustafa Jr., PA Follow up on 7/22/2019.    Specialty:  Physician Assistant  Contact information:  [address]  [facility] [address]  [phone]                   Contact information for after-discharge care     Destination     [facility] .    Service:  Skilled Nursing  Contact information:  [address]  [facility] [address]  [phone]                             Future Appointments:  Future Appointments   Date Time Provider Department Center   8/28/2019  9:00 AM PAD BIC US NIVASC BH PAD US BI PAD   8/28/2019 10:30 AM Saumya Morales APRN [facility] VS PAD None     Test Results Pending at Discharge: None    The above documentation resulted from a face-to-face encounter by me Flor TRAVIS, [facility].    ANGY Montemayor  07/08/19  9:09 AM    Time:  This discharge process required 35 minutes for completion.     Plan discussed with Dr. Weaver and patient.    Time spent in face-to-face evaluation, chart review, planning and education 35 minutes.  Please note that portions of this note may have been completed with a voice recognition program. Efforts were made to edit the  dictations, but occasionally words are mistranscribed.      I personally evaluated and examined the patient in conjunction with ANGY Haile and agree with the assessment, treatment plan, and disposition of the patient as recorded by her. My history, exam, and further recommendations are: I have reviewed and agree with the plans. Garrett Weaver MD  07/08/19  9:32 AM

## 2020-08-25 PROBLEM — Z95.5 PRESENCE OF CORONARY ANGIOPLASTY IMPLANT AND GRAFT: Chronic | Status: ACTIVE | Noted: 2019-07-02

## 2020-08-25 PROBLEM — Z87.19 PERSONAL HISTORY OF OTHER DISEASES OF THE DIGESTIVE SYSTEM: Chronic | Status: ACTIVE | Noted: 2020-08-21

## 2020-08-28 DIAGNOSIS — M10.9 GOUT, UNSPECIFIED: ICD-10-CM

## 2020-08-28 DIAGNOSIS — E78.5 HYPERLIPIDEMIA, UNSPECIFIED: ICD-10-CM

## 2020-08-28 DIAGNOSIS — G47.33 OBSTRUCTIVE SLEEP APNEA (ADULT) (PEDIATRIC): ICD-10-CM

## 2020-08-28 DIAGNOSIS — K92.2 GASTROINTESTINAL HEMORRHAGE, UNSPECIFIED: ICD-10-CM

## 2020-08-28 DIAGNOSIS — K57.30 DIVERTICULOSIS OF LARGE INTESTINE WITHOUT PERFORATION OR ABSCESS WITHOUT BLEEDING: ICD-10-CM

## 2020-08-28 DIAGNOSIS — I10 ESSENTIAL (PRIMARY) HYPERTENSION: ICD-10-CM

## 2020-08-28 DIAGNOSIS — Z90.79 ACQUIRED ABSENCE OF OTHER GENITAL ORGAN(S): ICD-10-CM

## 2020-08-28 DIAGNOSIS — I25.10 ATHEROSCLEROTIC HEART DISEASE OF NATIVE CORONARY ARTERY WITHOUT ANGINA PECTORIS: ICD-10-CM

## 2020-08-28 DIAGNOSIS — Z79.82 LONG TERM (CURRENT) USE OF ASPIRIN: ICD-10-CM

## 2020-08-28 DIAGNOSIS — N40.0 BENIGN PROSTATIC HYPERPLASIA WITHOUT LOWER URINARY TRACT SYMPTOMS: ICD-10-CM

## 2020-08-28 DIAGNOSIS — Z95.5 PRESENCE OF CORONARY ANGIOPLASTY IMPLANT AND GRAFT: ICD-10-CM

## 2020-08-28 DIAGNOSIS — Z20.828 CONTACT WITH AND (SUSPECTED) EXPOSURE TO OTHER VIRAL COMMUNICABLE DISEASES: ICD-10-CM

## 2020-08-28 DIAGNOSIS — K64.1 SECOND DEGREE HEMORRHOIDS: ICD-10-CM

## 2020-08-28 DIAGNOSIS — D12.2 BENIGN NEOPLASM OF ASCENDING COLON: ICD-10-CM

## 2020-08-28 DIAGNOSIS — D12.0 BENIGN NEOPLASM OF CECUM: ICD-10-CM

## 2020-08-31 DIAGNOSIS — K92.2 GASTROINTESTINAL HEMORRHAGE, UNSPECIFIED: ICD-10-CM

## 2020-09-29 ENCOUNTER — APPOINTMENT (OUTPATIENT)
Dept: COLORECTAL SURGERY | Facility: CLINIC | Age: 75
End: 2020-09-29
Payer: MEDICARE

## 2020-09-29 VITALS — BODY MASS INDEX: 32.58 KG/M2 | RESPIRATION RATE: 14 BRPM | TEMPERATURE: 96.3 F | HEIGHT: 69 IN | WEIGHT: 220 LBS

## 2020-09-29 DIAGNOSIS — K92.2 GASTROINTESTINAL HEMORRHAGE, UNSPECIFIED: ICD-10-CM

## 2020-09-29 PROCEDURE — 99213 OFFICE O/P EST LOW 20 MIN: CPT

## 2020-09-29 NOTE — PHYSICAL EXAM
[Abdomen Masses] : No abdominal masses [Abdomen Tenderness] : ~T No ~M abdominal tenderness [Tender] : nontender [JVD] : no jugular venous distention  [Normal Breath Sounds] : Normal breath sounds [Normal Heart Sounds] : normal heart sounds [Normal Rate and Rhythm] : normal rate and rhythm [No Rash or Lesion] : No rash or lesion [Alert] : alert [Oriented to Person] : oriented to person [Oriented to Place] : oriented to place [Oriented to Time] : oriented to time [Calm] : calm [de-identified] : Obese [de-identified] : Looks well in no distress, of stated age. [de-identified] : pupils equal reactive to light normocephalic atraumatic. [de-identified] : moves all 4 extremities appropriately with 5 over 5 strength

## 2020-09-29 NOTE — CONSULT LETTER
[Please see my note below.] : Please see my note below. [Consult Closing:] : Thank you very much for allowing me to participate in the care of this patient.  If you have any questions, please do not hesitate to contact me. [Sincerely,] : Sincerely, [FreeTextEntry3] : Margarito Wright M.D. FACS, FASCRS

## 2020-09-29 NOTE — HISTORY OF PRESENT ILLNESS
[FreeTextEntry1] :  75-year-old who has had intermittent episodes of lower GI bleeding thought to be from hemorrhoids but could be from diverticular disease. Patient reported having dark blood with clots with bowel movements In the past however none several weeks denies any abdominal pain fevers or chills.Colonoscopy demonstrated diverticulosis and 2 small benign polyps but no active bleeding

## 2020-09-29 NOTE — ASSESSMENT
[FreeTextEntry1] : 75-year-old with lower GI bleeding Which has resolved. Recommend resumption of his aspirin for his coronary stents. Followup p.r.n.\par

## 2020-11-18 ENCOUNTER — INPATIENT (INPATIENT)
Facility: HOSPITAL | Age: 75
LOS: 15 days | Discharge: HOME | DRG: 177 | End: 2020-12-04
Attending: HOSPITALIST | Admitting: STUDENT IN AN ORGANIZED HEALTH CARE EDUCATION/TRAINING PROGRAM
Payer: MEDICARE

## 2020-11-18 VITALS
OXYGEN SATURATION: 85 % | WEIGHT: 220.02 LBS | DIASTOLIC BLOOD PRESSURE: 75 MMHG | SYSTOLIC BLOOD PRESSURE: 116 MMHG | RESPIRATION RATE: 24 BRPM | HEART RATE: 97 BPM | TEMPERATURE: 100 F | HEIGHT: 69 IN

## 2020-11-18 DIAGNOSIS — Z98.890 OTHER SPECIFIED POSTPROCEDURAL STATES: Chronic | ICD-10-CM

## 2020-11-18 DIAGNOSIS — U07.1 COVID-19: ICD-10-CM

## 2020-11-18 DIAGNOSIS — Z98.49 CATARACT EXTRACTION STATUS, UNSPECIFIED EYE: Chronic | ICD-10-CM

## 2020-11-18 DIAGNOSIS — M50.20 OTHER CERVICAL DISC DISPLACEMENT, UNSPECIFIED CERVICAL REGION: Chronic | ICD-10-CM

## 2020-11-18 LAB
ALBUMIN SERPL ELPH-MCNC: 3.3 G/DL — SIGNIFICANT CHANGE UP (ref 3.3–5.2)
ALP SERPL-CCNC: 44 U/L — SIGNIFICANT CHANGE UP (ref 40–120)
ALT FLD-CCNC: 29 U/L — SIGNIFICANT CHANGE UP
ANION GAP SERPL CALC-SCNC: 12 MMOL/L — SIGNIFICANT CHANGE UP (ref 5–17)
AST SERPL-CCNC: 48 U/L — HIGH
BASOPHILS # BLD AUTO: 0 K/UL — SIGNIFICANT CHANGE UP (ref 0–0.2)
BASOPHILS NFR BLD AUTO: 0 % — SIGNIFICANT CHANGE UP (ref 0–2)
BILIRUB SERPL-MCNC: 0.4 MG/DL — SIGNIFICANT CHANGE UP (ref 0.4–2)
BUN SERPL-MCNC: 19 MG/DL — SIGNIFICANT CHANGE UP (ref 8–20)
CALCIUM SERPL-MCNC: 8.5 MG/DL — LOW (ref 8.6–10.2)
CHLORIDE SERPL-SCNC: 99 MMOL/L — SIGNIFICANT CHANGE UP (ref 98–107)
CO2 SERPL-SCNC: 25 MMOL/L — SIGNIFICANT CHANGE UP (ref 22–29)
CREAT SERPL-MCNC: 1.11 MG/DL — SIGNIFICANT CHANGE UP (ref 0.5–1.3)
CRP SERPL-MCNC: 13.12 MG/DL — HIGH (ref 0–0.4)
D DIMER BLD IA.RAPID-MCNC: 183 NG/ML DDU — SIGNIFICANT CHANGE UP
EOSINOPHIL # BLD AUTO: 0 K/UL — SIGNIFICANT CHANGE UP (ref 0–0.5)
EOSINOPHIL NFR BLD AUTO: 0 % — SIGNIFICANT CHANGE UP (ref 0–6)
FERRITIN SERPL-MCNC: 155 NG/ML — SIGNIFICANT CHANGE UP (ref 30–400)
GLUCOSE SERPL-MCNC: 102 MG/DL — HIGH (ref 70–99)
HCT VFR BLD CALC: 39.9 % — SIGNIFICANT CHANGE UP (ref 39–50)
HGB BLD-MCNC: 13 G/DL — SIGNIFICANT CHANGE UP (ref 13–17)
IMM GRANULOCYTES NFR BLD AUTO: 0.6 % — SIGNIFICANT CHANGE UP (ref 0–1.5)
LYMPHOCYTES # BLD AUTO: 0.64 K/UL — LOW (ref 1–3.3)
LYMPHOCYTES # BLD AUTO: 11.8 % — LOW (ref 13–44)
MCHC RBC-ENTMCNC: 30 PG — SIGNIFICANT CHANGE UP (ref 27–34)
MCHC RBC-ENTMCNC: 32.6 GM/DL — SIGNIFICANT CHANGE UP (ref 32–36)
MCV RBC AUTO: 91.9 FL — SIGNIFICANT CHANGE UP (ref 80–100)
MONOCYTES # BLD AUTO: 0.39 K/UL — SIGNIFICANT CHANGE UP (ref 0–0.9)
MONOCYTES NFR BLD AUTO: 7.2 % — SIGNIFICANT CHANGE UP (ref 2–14)
NEUTROPHILS # BLD AUTO: 4.38 K/UL — SIGNIFICANT CHANGE UP (ref 1.8–7.4)
NEUTROPHILS NFR BLD AUTO: 80.4 % — HIGH (ref 43–77)
NT-PROBNP SERPL-SCNC: 308 PG/ML — HIGH (ref 0–300)
PLATELET # BLD AUTO: 213 K/UL — SIGNIFICANT CHANGE UP (ref 150–400)
POTASSIUM SERPL-MCNC: 3.9 MMOL/L — SIGNIFICANT CHANGE UP (ref 3.5–5.3)
POTASSIUM SERPL-SCNC: 3.9 MMOL/L — SIGNIFICANT CHANGE UP (ref 3.5–5.3)
PROCALCITONIN SERPL-MCNC: 0.17 NG/ML — HIGH (ref 0.02–0.1)
PROT SERPL-MCNC: 6.4 G/DL — LOW (ref 6.6–8.7)
RBC # BLD: 4.34 M/UL — SIGNIFICANT CHANGE UP (ref 4.2–5.8)
RBC # FLD: 16 % — HIGH (ref 10.3–14.5)
SARS-COV-2 RNA SPEC QL NAA+PROBE: DETECTED
SODIUM SERPL-SCNC: 136 MMOL/L — SIGNIFICANT CHANGE UP (ref 135–145)
TROPONIN T SERPL-MCNC: <0.01 NG/ML — SIGNIFICANT CHANGE UP (ref 0–0.06)
WBC # BLD: 5.44 K/UL — SIGNIFICANT CHANGE UP (ref 3.8–10.5)
WBC # FLD AUTO: 5.44 K/UL — SIGNIFICANT CHANGE UP (ref 3.8–10.5)

## 2020-11-18 PROCEDURE — 71045 X-RAY EXAM CHEST 1 VIEW: CPT | Mod: 26

## 2020-11-18 PROCEDURE — 99291 CRITICAL CARE FIRST HOUR: CPT | Mod: CS

## 2020-11-18 PROCEDURE — 93010 ELECTROCARDIOGRAM REPORT: CPT

## 2020-11-18 PROCEDURE — 99223 1ST HOSP IP/OBS HIGH 75: CPT | Mod: CS,AI

## 2020-11-18 RX ORDER — ALLOPURINOL 300 MG
300 TABLET ORAL DAILY
Refills: 0 | Status: DISCONTINUED | OUTPATIENT
Start: 2020-11-18 | End: 2020-12-04

## 2020-11-18 RX ORDER — ALBUTEROL 90 UG/1
2 AEROSOL, METERED ORAL EVERY 4 HOURS
Refills: 0 | Status: DISCONTINUED | OUTPATIENT
Start: 2020-11-18 | End: 2020-12-04

## 2020-11-18 RX ORDER — ASPIRIN/CALCIUM CARB/MAGNESIUM 324 MG
81 TABLET ORAL DAILY
Refills: 0 | Status: DISCONTINUED | OUTPATIENT
Start: 2020-11-18 | End: 2020-12-04

## 2020-11-18 RX ORDER — ENOXAPARIN SODIUM 100 MG/ML
40 INJECTION SUBCUTANEOUS EVERY 12 HOURS
Refills: 0 | Status: DISCONTINUED | OUTPATIENT
Start: 2020-11-18 | End: 2020-12-04

## 2020-11-18 RX ORDER — ASCORBIC ACID 60 MG
500 TABLET,CHEWABLE ORAL DAILY
Refills: 0 | Status: DISCONTINUED | OUTPATIENT
Start: 2020-11-18 | End: 2020-12-04

## 2020-11-18 RX ORDER — DEXAMETHASONE 0.5 MG/5ML
6 ELIXIR ORAL DAILY
Refills: 0 | Status: COMPLETED | OUTPATIENT
Start: 2020-11-18 | End: 2020-11-23

## 2020-11-18 RX ORDER — DEXAMETHASONE 0.5 MG/5ML
10 ELIXIR ORAL ONCE
Refills: 0 | Status: COMPLETED | OUTPATIENT
Start: 2020-11-18 | End: 2020-11-18

## 2020-11-18 RX ORDER — SENNA PLUS 8.6 MG/1
1 TABLET ORAL AT BEDTIME
Refills: 0 | Status: DISCONTINUED | OUTPATIENT
Start: 2020-11-18 | End: 2020-11-23

## 2020-11-18 RX ORDER — ACETAMINOPHEN 500 MG
650 TABLET ORAL EVERY 4 HOURS
Refills: 0 | Status: DISCONTINUED | OUTPATIENT
Start: 2020-11-18 | End: 2020-12-04

## 2020-11-18 RX ADMIN — ENOXAPARIN SODIUM 40 MILLIGRAM(S): 100 INJECTION SUBCUTANEOUS at 17:50

## 2020-11-18 RX ADMIN — SENNA PLUS 1 TABLET(S): 8.6 TABLET ORAL at 21:56

## 2020-11-18 RX ADMIN — Medication 102 MILLIGRAM(S): at 14:00

## 2020-11-18 NOTE — H&P ADULT - ATTENDING COMMENTS
GENERAL: pt examined bedside, lying comfortably in bed in NAD, able to speak full sentences  HEENT: NC/AT, has hearing aids in place, dry oral mucosa, clear conjunctiva, nonicteric sclera  NECK: supple, no JVD  LYMPH: no lymphadenopathy   CARDIOVASCULAR: RRR, normal s1, s2  RESPIRATORY: no accessory muscle use, poor inspiratory effort, scattered bibasilar rales, no wheezing or rhonchi  ABDOMEN: soft, obese abd, NT/ND, +BS  EXTREMITIES: no cyanosis, trace pretibial edema, no clubbing  MSK: no joint swelling or erythema  NEURO: AAOx3, no focal deficits appreciated   PSYCH: normal affect and cooperative  SKIN: normal, no rashes or lesions

## 2020-11-18 NOTE — ED ADULT NURSE NOTE - PMH
BPH (benign prostatic hyperplasia)    CAD (coronary artery disease)    Hard of hearing, bilateral  wears bilateral hearing aids  History of diverticulitis    History of diverticulosis  on colonoscopy  History of gout    History of hemorrhoids  cauterization ( h/o bleeding due to Plavix )  History of hypertension  no longer on medications  History of psoriasis    History of syncope  2/2019 on the plane to [place], was thoroughly evaluated , off HTN medications, was dehydrated.  Hyperlipidemia    BEV (obstructive sleep apnea)  severe, not using CPAP  Presence of stent in coronary artery  4 stents in LAD 6/2018 . Off

## 2020-11-18 NOTE — ED PROVIDER NOTE - CLINICAL SUMMARY MEDICAL DECISION MAKING FREE TEXT BOX
74 yo male presenting to ER with + outpatient covid testing with progressive worsening of shortness of breath and fevers. ED covid labs ekg chest xray, will continue to monitor,

## 2020-11-18 NOTE — ED ADULT NURSE NOTE - CHPI ED NUR AGGRAVATING FX
[facility]    Discharge Summary     Patient ID: Noe Zaidi  :  1967   MRN: [id number]     ACCOUNT:  [de-identified]   Patient's PCP: Raúl Fay CNP  Admit Date: 2017   Discharge Date: 2017   Length of Stay: 2  Code Status:  Full Code  Admitting Physician: Mu Umanzor MD  Discharge Physician: Darion Wood MD     Active Discharge Diagnoses:       Primary Problem  Suicidal ideation      [place] Problems    Diagnosis Date Noted    Suicidal ideation [R45.851] 2017    Alcohol abuse [F10.10] 2017       Admission Condition:  fair     Discharged Condition: good    Hospital Stay:       Hospital Course:  Noe Zaidi is a 45 y.o. female who was admitted for the management of acute alcohol intoxication Suicidal ideation , presented to ER with BAL of 430 Mental Health Problem and Depression. Patient was started on CIWA scale and Librium. Patient medically stable. She was seen by psychiatry and is admitted to [facility]. Significant therapeutic interventions: CIWA scale and Librium     Significant Diagnostic Studies:   Labs / Micro:  CBC:   Lab Results   Component Value Date    WBC 3.8 2017    RBC 4.21 2017    HGB 12.8 2017    HCT 38.6 2017    MCV 91.7 2017    MCH 30.4 2017    MCHC 33.2 2017    RDW 16.6 2017    PLT 79 2017     BMP:    Lab Results   Component Value Date    GLUCOSE 88 2017     2017    K 3.2 2017     2017    CO2 22 2017    ANIONGAP 13 2017    BUN 4 2017    CREATININE 0.44 2017    BUNCRER NOT REPORTED 2017    CALCIUM 6.9 2017    LABGLOM >60 2017    GFRAA >60 2017    GFR      2017    GFR NOT REPORTED 2017       Radiology:  No results found.       Consultations:    Consults:     Final Specialist Recommendations/Findings: none

## 2020-11-18 NOTE — H&P ADULT - NSICDXFAMILYHX_GEN_ALL_CORE_FT
FAMILY HISTORY:  Father  Still living? No  Family history of essential hypertension, Age at diagnosis: Age Unknown  FH: myocardial infarction, Age at diagnosis: Age Unknown

## 2020-11-18 NOTE — H&P ADULT - NSICDXPASTMEDICALHX_GEN_ALL_CORE_FT
PAST MEDICAL HISTORY:  BPH (benign prostatic hyperplasia)     CAD (coronary artery disease)     Hard of hearing, bilateral wears bilateral hearing aids    History of diverticulitis     History of diverticulosis on colonoscopy    History of gout     History of hemorrhoids cauterization ( h/o bleeding due to Plavix )    History of hypertension no longer on medications    History of psoriasis     History of syncope 2/2019 on the plane to [place], was thoroughly evaluated , off HTN medications, was dehydrated.    Hyperlipidemia     BEV (obstructive sleep apnea) severe, not using CPAP    Presence of stent in coronary artery 4 stents in LAD 6/2018 . Off

## 2020-11-18 NOTE — ED PROVIDER NOTE - CARE PLAN
Principal Discharge DX:	Acute respiratory disease due to COVID-19 virus  Secondary Diagnosis:	Hypoxia

## 2020-11-18 NOTE — ED ADULT TRIAGE NOTE - CHIEF COMPLAINT QUOTE
pt reports he started with a sinus infection, then a couple relatives were getting sick so we went to get tested for covid last tuesday and it was positive. past few days c/o fevers & cough

## 2020-11-18 NOTE — ED PROVIDER NOTE - OBJECTIVE STATEMENT
76 yo male hx of htn hld CAD multiple stents , last 2018, diverticulitis, BEV, bib family for reported fevers at home and shortness of breath. was treated for sinusitis 2 weeks ago with persistent symptoms post antibiotics, which lead to testing for covid  which was positive, sates that symptoms persisted and progressively worsened leading to coming to ER today. denies chest pain, no hemoptysis. no abdominal pain  no hx of blood clots. no previous hx of covid   no smoking hx no illicit drug no etoh   no allergies to medications

## 2020-11-18 NOTE — H&P ADULT - NSHPLABSRESULTS_GEN_ALL_CORE
CBC Full  -  ( 18 Nov 2020 14:00 )  WBC Count : 5.44 K/uL  RBC Count : 4.34 M/uL  Hemoglobin : 13.0 g/dL  Hematocrit : 39.9 %  Platelet Count - Automated : 213 K/uL  Mean Cell Volume : 91.9 fl  Mean Cell Hemoglobin : 30.0 pg  Mean Cell Hemoglobin Concentration : 32.6 gm/dL  Auto Neutrophil # : 4.38 K/uL  Auto Lymphocyte # : 0.64 K/uL  Auto Monocyte # : 0.39 K/uL  Auto Eosinophil # : 0.00 K/uL  Auto Basophil # : 0.00 K/uL  Auto Neutrophil % : 80.4 %  Auto Lymphocyte % : 11.8 %  Auto Monocyte % : 7.2 %  Auto Eosinophil % : 0.0 %  Auto Basophil % : 0.0 %      11-18    136  |  99  |  19.0  ----------------------------<  102<H>  3.9   |  25.0  |  1.11    Ca    8.5<L>      18 Nov 2020 14:00    TPro  6.4<L>  /  Alb  3.3  /  TBili  0.4  /  DBili  x   /  AST  48<H>  /  ALT  29  /  AlkPhos  44  11-18

## 2020-11-18 NOTE — ED PROVIDER NOTE - FAMILY HISTORY
Father  Still living? No  Family history of essential hypertension, Age at diagnosis: Age Unknown  FH: myocardial infarction, Age at diagnosis: Age Unknown

## 2020-11-18 NOTE — ED ADULT NURSE NOTE - OBJECTIVE STATEMENT
Patient A&Ox4 complaining of feeling lethargic & tired. Stated began last night & he "just can't shake it." Stated tested positive for COVID x1 week ago & was on Remdesivir, completed yesterday. Also reports having fever. Denies any chest pain, shortness of breath, nausea or dizziness. Respirations even & unlabored. O2 in place via nasal cannula,  in progress. Stated began to have a dry cough today. Cardiac monitor in place. Patient assisted to left lateral position. Warm blanket provided as requested. IV site obtained, labs sent.

## 2020-11-18 NOTE — H&P ADULT - NSHPPHYSICALEXAM_GEN_ALL_CORE
Telehospitalist     General: obese male, sitting on the bed, with supplemental O2. AO x3, follows all commands and can provide a clear history

## 2020-11-18 NOTE — ED PROVIDER NOTE - ATTENDING CONTRIBUTION TO CARE
75yoM; with pmh signif for HTN, HLD, CAD (s/p stents, last intervention in 2019), [place]; now p/w fever and sob. patient reports having sinus congestion and headache 2 weeks ago.  states he went to PMD who placed him on Azithromycin. states patient had persistent fever and cough. went to PMD 1 week ago and was tested positive for COVID along with 8 family members(likely from a birthday party even 2 weeks ago). denies cp/palp. c/o increasing levin, lightheadedness/sob today.  denies abd pain. denies AC use.  denies smoking. denies DM  General: moderate resp distress  Head:     NC/AT, EOMI, oral mucosa moist  Neck:     trachea midline  Lungs:   bibasilar crackles  CVS:     tachycardic, no m/g/r  Abd:     +BS, s/nt/nd, no organomegaly  Ext:    2+ radial and pedal pulses, no c/c/e  Neuro: AAOx3, no sensory/motor deficits  A/P:  75yoM p/w covid with hypoxia  -labs, xray, cardiac monitor, pulse ox, oxygen supplementation, decadron, prone position or alternating, whichever patient can tolerate, dexamethasone, admit.

## 2020-11-18 NOTE — H&P ADULT - HISTORY OF PRESENT ILLNESS
76 yo  obese male from home, lives with wife, with PMHs of CAD s/p multiple stents, BEV, HLD, diverticulitis, psoriasis and gout presented with SOB. Per patient, he was tested positive for covid last Tuesday.  Was seen at urgent care and prescribed 7 days of azithromycin.. For the past 2-3 days noted to have  chills and fatigue.  Today developed SOB, hence came to ED. On admission to ED SpO2 85% on room air. Denies headache, dizziness, chest pain, palpitations, n/v, abdominal pain, change in urination or bowel habits.   Patients multiple family members were tested positive after having a recent  birthday party.

## 2020-11-18 NOTE — H&P ADULT - ASSESSMENT
74 yo male presented with SOB. COVID positive     1. Acute hypoxic respiratory failure likely secondary to COVID PNA.   On admission SpO2 85 on room air, tested positive 2 days ago, repeat COVID PCR sent   Elevated inflammatory markers   CXR:  enlarged  heart with lingular infiltrate  Low procalcitonin   Admit to medicine   On supplemental O2, currently on 6 LT   Start Dexamethazone 6 mg IV daily for 5 days   DUONEB q4   Tylenol for fever   Frequent proning   ID consult requested   76 yo male presented with SOB and chills . COVID positive     1. Acute hypoxic respiratory failure likely secondary to COVID PNA.   On admission SpO2 85 on room air, tested positive 2 days ago, repeat COVID PCR sent   Elevated inflammatory markers   CXR:  enlarged  heart with lingular infiltrate  Low procalcitonin   Admit to medicine   On supplemental O2, currently on 6 LT   Start Dexamethazone 6 mg IV daily for 5 days   DUONEB q4   Tylenol for fever   Frequent proning   ID consult requested    2. Hx of CAD: resume aspirin and atorvastatin   3. Hx. of gout: resume Allopurinol 300 mg po daily   4. DVT prophylaxis: enoxaparin 40 mg sc     Anticipated length of stay 3-4 days.   Plan of care discussed with patient and with patients wive over the phone.

## 2020-11-19 LAB
SARS-COV-2 IGG SERPL QL IA: NEGATIVE — SIGNIFICANT CHANGE UP
SARS-COV-2 IGM SERPL IA-ACNC: 0.37 INDEX — SIGNIFICANT CHANGE UP

## 2020-11-19 PROCEDURE — 99223 1ST HOSP IP/OBS HIGH 75: CPT | Mod: CS

## 2020-11-19 PROCEDURE — 99233 SBSQ HOSP IP/OBS HIGH 50: CPT | Mod: CS

## 2020-11-19 RX ORDER — REMDESIVIR 5 MG/ML
100 INJECTION INTRAVENOUS EVERY 24 HOURS
Refills: 0 | Status: COMPLETED | OUTPATIENT
Start: 2020-11-20 | End: 2020-11-23

## 2020-11-19 RX ORDER — INFLUENZA VIRUS VACCINE 15; 15; 15; 15 UG/.5ML; UG/.5ML; UG/.5ML; UG/.5ML
0.5 SUSPENSION INTRAMUSCULAR ONCE
Refills: 0 | Status: COMPLETED | OUTPATIENT
Start: 2020-11-19 | End: 2020-11-19

## 2020-11-19 RX ORDER — REMDESIVIR 5 MG/ML
INJECTION INTRAVENOUS
Refills: 0 | Status: COMPLETED | OUTPATIENT
Start: 2020-11-19 | End: 2020-11-23

## 2020-11-19 RX ORDER — REMDESIVIR 5 MG/ML
200 INJECTION INTRAVENOUS EVERY 24 HOURS
Refills: 0 | Status: COMPLETED | OUTPATIENT
Start: 2020-11-19 | End: 2020-11-19

## 2020-11-19 RX ADMIN — Medication 81 MILLIGRAM(S): at 07:47

## 2020-11-19 RX ADMIN — Medication 6 MILLIGRAM(S): at 05:27

## 2020-11-19 RX ADMIN — REMDESIVIR 200 MILLIGRAM(S): 5 INJECTION INTRAVENOUS at 13:13

## 2020-11-19 RX ADMIN — Medication 300 MILLIGRAM(S): at 07:47

## 2020-11-19 RX ADMIN — ENOXAPARIN SODIUM 40 MILLIGRAM(S): 100 INJECTION SUBCUTANEOUS at 05:27

## 2020-11-19 RX ADMIN — ENOXAPARIN SODIUM 40 MILLIGRAM(S): 100 INJECTION SUBCUTANEOUS at 17:24

## 2020-11-19 RX ADMIN — Medication 500 MILLIGRAM(S): at 07:47

## 2020-11-19 NOTE — CONSULT NOTE ADULT - SUBJECTIVE AND OBJECTIVE BOX
[facility]  INFECTIOUS DISEASES AND INTERNAL MEDICINE at [place]  =======================================================  Pablo Villagran MD  Diplomates American Board of Internal Medicine and Infectious Diseases  Tel: [phone]      Fax: [phone]  =======================================================      MRN-[id number]  DONTA CRABTREE is a 75y  Male     CC: Patient is a 75y old  Male who presents with a chief complaint of SOB (18 Nov 2020 17:11)    HPI:  76 yo  obese male from home, lives with wife, with PMHs of CAD s/p multiple stents, BEV, HLD, diverticulitis, psoriasis and gout presented with SOB. Per patient, he was tested positive for covid last Tuesday.  Was seen at urgent care and prescribed 7 days of azithromycin.. For the past 2-3 days noted to have  chills and fatigue.  Today developed SOB, hence came to ED. On admission to ED SpO2 85% on room air. Denies headache, dizziness, chest pain, palpitations, n/v, abdominal pain, change in urination or bowel habits.   Patients multiple family members were tested positive after having a recent  birthday party.  (18 Nov 2020 17:11)      PAST MEDICAL & SURGICAL HISTORY:  History of diverticulitis    History of syncope  2/2019 on the plane to [place], was thoroughly evaluated , off HTN medications, was dehydrated.    History of psoriasis    BEV (obstructive sleep apnea)  severe, not using CPAP    History of hypertension  no longer on medications    BPH (benign prostatic hyperplasia)    Hyperlipidemia    Presence of stent in coronary artery  4 stents in LAD 6/2018 . Off    CAD (coronary artery disease)    History of gout    History of hemorrhoids  cauterization ( h/o bleeding due to Plavix )    History of diverticulosis  on colonoscopy    Hard of hearing, bilateral  wears bilateral hearing aids    History of prostate biopsy  multiple- benign    S/P cardiac catheterization  5/2018 and 6/2018 s/p 4 stents in LAD    History of shoulder surgery  right rotator cuff x1, left rotater cuff x 2    History of cataract surgery  b/l few years ago    History of colonoscopy  2018    Cervical disc displacement  replaced c7 - and c 6 disc, Cervical fusion 2014        Social Hx: non smoker    FAMILY HISTORY:  FH: myocardial infarction (Father)  1989 at age of 78    Family history of essential hypertension (Father)  father        Allergies    No Known Allergies    Intolerances        MEDICATIONS  (STANDING):  allopurinol 300 milliGRAM(s) Oral daily  ascorbic acid 500 milliGRAM(s) Oral daily  aspirin  chewable 81 milliGRAM(s) Oral daily  dexAMETHasone  Injectable 6 milliGRAM(s) IV Push daily  enoxaparin Injectable 40 milliGRAM(s) SubCutaneous every 12 hours  influenza   Vaccine 0.5 milliLiter(s) IntraMuscular once  remdesivir  IVPB   IV Intermittent   senna 8.6 milliGRAM(s) Oral Tablet - Peds 1 Tablet(s) Oral at bedtime    MEDICATIONS  (PRN):  acetaminophen   Tablet .. 650 milliGRAM(s) Oral every 4 hours PRN Temp greater or equal to 38.5C (101.3F)  ALBUTerol    90 MICROgram(s) HFA Inhaler 2 Puff(s) Inhalation every 4 hours PRN Shortness of Breath and/or Wheezing      ANTIMICROBIALS:  remdesivir  IVPB        OTHER MEDS: MEDICATIONS  (STANDING):  acetaminophen   Tablet .. 650 every 4 hours PRN  ALBUTerol    90 MICROgram(s) HFA Inhaler 2 every 4 hours PRN  allopurinol 300 daily  aspirin  chewable 81 daily  dexAMETHasone  Injectable 6 daily  enoxaparin Injectable 40 every 12 hours  influenza   Vaccine 0.5 once  senna 8.6 milliGRAM(s) Oral Tablet - Peds 1 at bedtime             REVIEW OF SYSTEMS:  CONSTITUTIONAL:  No Fever or chills  HEENT:  No diplopia or blurred vision.  No earache, sore throat or runny nose.  CARDIOVASCULAR:  No pressure, squeezing, strangling, tightness, heaviness or aching about the chest, neck, axilla or epigastrium.  RESPIRATORY:  + cough, shortness of breath  GASTROINTESTINAL:  No nausea, vomiting or diarrhea.  GENITOURINARY:  No dysuria, frequency or urgency. No Blood in urine  MUSCULOSKELETAL:  no joint aches, no muscle pain  SKIN:  No change in skin, hair or nails.  NEUROLOGIC:  No Headaches, seizures or weakness.  PSYCHIATRIC:  No disorder of thought or mood.  ENDOCRINE:  No heat or cold intolerance  HEMATOLOGICAL:  No easy bruising or bleeding.           I&O's Detail    18 Nov 2020 07:01  -  19 Nov 2020 07:00  --------------------------------------------------------  IN:  Total IN: 0 mL    OUT:    Voided (mL): 300 mL  Total OUT: 300 mL    Total NET: -300 mL            Physical Exam:  Vital Signs Last 24 Hrs  T(C): 37.2 (19 Nov 2020 08:11), Max: 37.8 (18 Nov 2020 12:57)  T(F): 98.9 (19 Nov 2020 08:11), Max: 100 (18 Nov 2020 12:57)  HR: 74 (19 Nov 2020 08:11) (74 - 97)  BP: 144/86 (19 Nov 2020 08:11) (116/75 - 145/82)  BP(mean): --  RR: 20 (19 Nov 2020 08:11) (20 - 24)  SpO2: 92% (19 Nov 2020 08:11) (84% - 94%)  Height (cm): 175.3 (11-18 @ 12:57)  Weight (kg): 99.8 (11-18 @ 12:57)  BMI (kg/m2): 32.5 (11-18 @ 12:57)  BSA (m2): 2.15 (11-18 @ 12:57)  GEN: NAD, pleasant on VM  HEENT: normocephalic and atraumatic. EOMI. PERRL.  Anicteric  NECK: Supple.   LUNGS: Crackles to auscultation.  HEART: Regular rate and rhythm without murmur.  ABDOMEN: Soft, nontender, and nondistended.  Positive bowel sounds.    : No CVA tenderness  EXTREMITIES: Without any edema.  MSK: No joint swelling  NEUROLOGIC: Cranial nerves II through XII are grossly intact. No Focal Deficits  PSYCHIATRIC: Appropriate affect .  SKIN: No Rash        Labs:                        13.0   5.44  )-----------( 213      ( 18 Nov 2020 14:00 )             39.9   11-18    136  |  99  |  19.0  ----------------------------<  102<H>  3.9   |  25.0  |  1.11    Ca    8.5<L>      18 Nov 2020 14:00    TPro  6.4<L>  /  Alb  3.3  /  TBili  0.4  /  DBili  x   /  AST  48<H>  /  ALT  29  /  AlkPhos  44  11-18      Radiology:  < from: Xray Chest 1 View- PORTABLE-Urgent (11.18.20 @ 14:38) >  INTERPRETATION:  AP chest on November 18, 2020 at 2:12 PM. Patient is short of breath with cough and fever.    Heart is likely enlarged.    Present film shows a lingular infiltrate new since May 26, 2018.    IMPRESSION: Heart enlargement again noted. Lingular infiltrate at this time.      < end of copied text >

## 2020-11-19 NOTE — PROGRESS NOTE ADULT - SUBJECTIVE AND OBJECTIVE BOX
Chief Complaint:      SUBJECTIVE / OVERNIGHT EVENTS:    Patient denies chest pain, SOB, abd pain, N/V, fever, chills, dysuria or any other complaints. All remainder ROS negative.       I&O's Summary    18 Nov 2020 07:01  -  19 Nov 2020 07:00  --------------------------------------------------------  IN: 0 mL / OUT: 300 mL / NET: -300 mL          PHYSICAL EXAM:  Vital Signs Last 24 Hrs  T(C): 37.2 (19 Nov 2020 08:11), Max: 37.6 (18 Nov 2020 18:29)  T(F): 98.9 (19 Nov 2020 08:11), Max: 99.7 (18 Nov 2020 18:29)  HR: 74 (19 Nov 2020 08:11) (74 - 96)  BP: 144/86 (19 Nov 2020 08:11) (118/82 - 145/82)  BP(mean): --  RR: 20 (19 Nov 2020 08:11) (20 - 20)  SpO2: 92% (19 Nov 2020 08:11) (84% - 94%)      CONSTITUTIONAL: pt examined bedside, laying comfortably in bed in NAD  HEENT: NC/AT, moist oral mucosa, clear conjunctiva, sclera nonicteric, EOMI  RESPIRATORY: Normal respiratory effort; CTA b/l, no wheezing, rhonchi, rales  CARDIOVASCULAR: RRR, normal S1 and S2, no murmur/rub/gallop  ABDOMEN: soft, NT/ND, normoactive bowel sounds, no rebound/guarding, no HSM  MUSCLOSKELETAL:  no joint swelling or tenderness to palpation  EXTREMITIES: No cynaosis, no clubbing, no lower extremity edema; Peripheral pulses are 2+ bilaterally  PSYCH: affect appropriate and cooperative  NEUROLOGY: A+O to person, place, and time, no focal neurologic deficits appreciated   SKIN: No rashes or no palpable lesions        LABS:                        13.0   5.44  )-----------( 213      ( 18 Nov 2020 14:00 )             39.9     11-18    136  |  99  |  19.0  ----------------------------<  102<H>  3.9   |  25.0  |  1.11    Ca    8.5<L>      18 Nov 2020 14:00    TPro  6.4<L>  /  Alb  3.3  /  TBili  0.4  /  DBili  x   /  AST  48<H>  /  ALT  29  /  AlkPhos  44  11-18    PT/INR - ( 18 Nov 2020 14:00 )   PT: 14.0 sec;   INR: 1.22 ratio         PTT - ( 18 Nov 2020 14:00 )  PTT:34.0 sec  CARDIAC MARKERS ( 18 Nov 2020 14:00 )  x     / <0.01 ng/mL / x     / x     / x              CAPILLARY BLOOD GLUCOSE            RADIOLOGY & ADDITIONAL TESTS:          MEDICATIONS  (STANDING):  allopurinol 300 milliGRAM(s) Oral daily  ascorbic acid 500 milliGRAM(s) Oral daily  aspirin  chewable 81 milliGRAM(s) Oral daily  dexAMETHasone  Injectable 6 milliGRAM(s) IV Push daily  enoxaparin Injectable 40 milliGRAM(s) SubCutaneous every 12 hours  influenza   Vaccine 0.5 milliLiter(s) IntraMuscular once  remdesivir  IVPB   IV Intermittent   senna 8.6 milliGRAM(s) Oral Tablet - Peds 1 Tablet(s) Oral at bedtime    MEDICATIONS  (PRN):  acetaminophen   Tablet .. 650 milliGRAM(s) Oral every 4 hours PRN Temp greater or equal to 38.5C (101.3F)  ALBUTerol    90 MICROgram(s) HFA Inhaler 2 Puff(s) Inhalation every 4 hours PRN Shortness of Breath and/or Wheezing                                     Chief Complaint:  sob    SUBJECTIVE / OVERNIGHT EVENTS: Pt requiring increase in supplemental O2 overnight due to hypoxia.  Now sating >94% on 50% VM.     Patient denies chest pain, SOB, abd pain, N/V, fever, chills, dysuria or any other complaints. All remainder ROS negative.       I&O's Summary    18 Nov 2020 07:01  -  19 Nov 2020 07:00  --------------------------------------------------------  IN: 0 mL / OUT: 300 mL / NET: -300 mL          PHYSICAL EXAM:  Vital Signs Last 24 Hrs  T(C): 37.2 (19 Nov 2020 08:11), Max: 37.6 (18 Nov 2020 18:29)  T(F): 98.9 (19 Nov 2020 08:11), Max: 99.7 (18 Nov 2020 18:29)  HR: 74 (19 Nov 2020 08:11) (74 - 96)  BP: 144/86 (19 Nov 2020 08:11) (118/82 - 145/82)  BP(mean): --  RR: 20 (19 Nov 2020 08:11) (20 - 20)  SpO2: 92% (19 Nov 2020 08:11) (84% - 94%)      GENERAL: pt examined bedside, lying comfortably in bed in NAD, able to speak full sentences on 50%VM  HEENT: NC/AT, has hearing aids in place, dry oral mucosa, clear conjunctiva, nonicteric sclera  CARDIOVASCULAR: RRR, normal s1, s2  RESPIRATORY: no accessory muscle use, scattered bibasilar rales, no wheezing or rhonchi  ABDOMEN: soft, obese abd, NT/ND, +BS  EXTREMITIES: no cyanosis, trace pretibial edema, no clubbing  MSK: no joint swelling or erythema  NEURO: AAOx3, no focal deficits appreciated   PSYCH: normal affect and cooperative  SKIN: normal, no rashes or lesions        LABS:                        13.0   5.44  )-----------( 213      ( 18 Nov 2020 14:00 )             39.9     11-18    136  |  99  |  19.0  ----------------------------<  102<H>  3.9   |  25.0  |  1.11    Ca    8.5<L>      18 Nov 2020 14:00    TPro  6.4<L>  /  Alb  3.3  /  TBili  0.4  /  DBili  x   /  AST  48<H>  /  ALT  29  /  AlkPhos  44  11-18    PT/INR - ( 18 Nov 2020 14:00 )   PT: 14.0 sec;   INR: 1.22 ratio         PTT - ( 18 Nov 2020 14:00 )  PTT:34.0 sec  CARDIAC MARKERS ( 18 Nov 2020 14:00 )  x     / <0.01 ng/mL / x     / x     / x              CAPILLARY BLOOD GLUCOSE            RADIOLOGY & ADDITIONAL TESTS:          MEDICATIONS  (STANDING):  allopurinol 300 milliGRAM(s) Oral daily  ascorbic acid 500 milliGRAM(s) Oral daily  aspirin  chewable 81 milliGRAM(s) Oral daily  dexAMETHasone  Injectable 6 milliGRAM(s) IV Push daily  enoxaparin Injectable 40 milliGRAM(s) SubCutaneous every 12 hours  influenza   Vaccine 0.5 milliLiter(s) IntraMuscular once  remdesivir  IVPB   IV Intermittent   senna 8.6 milliGRAM(s) Oral Tablet - Peds 1 Tablet(s) Oral at bedtime    MEDICATIONS  (PRN):  acetaminophen   Tablet .. 650 milliGRAM(s) Oral every 4 hours PRN Temp greater or equal to 38.5C (101.3F)  ALBUTerol    90 MICROgram(s) HFA Inhaler 2 Puff(s) Inhalation every 4 hours PRN Shortness of Breath and/or Wheezing

## 2020-11-19 NOTE — CONSULT NOTE ADULT - ASSESSMENT
76 yo  obese male from home, lives with wife, with PMHs of CAD s/p multiple stents, BEV, HLD, diverticulitis, psoriasis and gout presented with SOB. Per patient, he was tested positive for covid last Tuesday.  Was seen at urgent care and prescribed 7 days of azithromycin.. For the past 2-3 days noted to have  chills and fatigue.  Today developed SOB, hence came to ED. On admission to ED SpO2 85% on room air, COVId19 +.    COVID 19 + infection  Viral pneumonia  Acute hypoxic respiratory failure    - patient is on VM 50% currently feeling a little better  - d dimer wnl  - CRP 13. PCT 0.17  - discussed use of remdesivir and patient agrees to start-will order  - decadron 6 mg daily while on remdesivir  - Avoid antibiotics unless there is a concern for a bacterial infection  - VTE prophylaxis per current [facility] COVID 19 protocol  - Check CBC with diff, CMP with LFT's, Inflammatory markers (ESR, CRP, Ferritin, LDH,  procalcitonin)  q48h.  D dimer, lactate, troponin, CK, PT/PTT, type and screen x 1  - Encourage self proning q2h and ambulation as tolerated  - Taper off O2 as tolerated- once tolerating room air would ideally monitor for 24h prior to discharge. Continue self quarantine at home x 14 days from date of positive COVID 19 PCR  - Inpatient Contact/Airborne isolation         Discussed with team, pharmacy, RN  Will follow

## 2020-11-19 NOTE — PROGRESS NOTE ADULT - ASSESSMENT
76 y/o M w/ a PMH of CAD, gout, obesity comes in c/o worsening SOB w/ associated chills.  Pt was found to be COVID + in the community and COVID PCR in hospital also +.  ID consulted, pt on decadron and started on Remdesivire.       Acute hypoxic respiratory failure 2/2 COVID PNA  - Pt placed on supplemental O2 in the form of NC on admission for SpO2 85 on room air  - Supplemental O2 escalated to VM due to progressing hypoxia.  Now saturating >94% on 50%VM  - COVID PCR + and elevated inflammatory markers on admission  - CXR showed enlarged  heart with lingular infiltrate  - c/w Dexamethazone 6 mg IV daily x 5 days   - Started on Remdesivire   - ID following  - Vitamin C, thiamine and zinc  - MDI q4h   - Tylenol for fever   - Encouraged frequent proning   - c/w incentive spirometry       Hx of CAD  - c/w aspirin and atorvastatin       Hx of gout  - c/w Allopurinol 300 mg po daily       DVT ppx:  - Enoxaparin 40 mg sc q12h given COVID status        Dispo: Pt remains acute and requiring supplemental O2 however anticipate possible d/c home in 3-5 days.

## 2020-11-20 LAB
ALBUMIN SERPL ELPH-MCNC: 3.2 G/DL — LOW (ref 3.3–5.2)
ALBUMIN SERPL ELPH-MCNC: 3.3 G/DL — SIGNIFICANT CHANGE UP (ref 3.3–5.2)
ALP SERPL-CCNC: 48 U/L — SIGNIFICANT CHANGE UP (ref 40–120)
ALP SERPL-CCNC: 50 U/L — SIGNIFICANT CHANGE UP (ref 40–120)
ALT FLD-CCNC: 55 U/L — HIGH
ALT FLD-CCNC: 55 U/L — HIGH
ANION GAP SERPL CALC-SCNC: 11 MMOL/L — SIGNIFICANT CHANGE UP (ref 5–17)
AST SERPL-CCNC: 71 U/L — HIGH
AST SERPL-CCNC: 72 U/L — HIGH
BASOPHILS # BLD AUTO: 0.02 K/UL — SIGNIFICANT CHANGE UP (ref 0–0.2)
BASOPHILS NFR BLD AUTO: 0.2 % — SIGNIFICANT CHANGE UP (ref 0–2)
BILIRUB DIRECT SERPL-MCNC: 0.1 MG/DL — SIGNIFICANT CHANGE UP (ref 0–0.3)
BILIRUB INDIRECT FLD-MCNC: 0.3 MG/DL — SIGNIFICANT CHANGE UP (ref 0.2–1)
BILIRUB SERPL-MCNC: 0.4 MG/DL — SIGNIFICANT CHANGE UP (ref 0.4–2)
BILIRUB SERPL-MCNC: 0.4 MG/DL — SIGNIFICANT CHANGE UP (ref 0.4–2)
BLD GP AB SCN SERPL QL: SIGNIFICANT CHANGE UP
BUN SERPL-MCNC: 28 MG/DL — HIGH (ref 8–20)
CALCIUM SERPL-MCNC: 8.5 MG/DL — LOW (ref 8.6–10.2)
CHLORIDE SERPL-SCNC: 99 MMOL/L — SIGNIFICANT CHANGE UP (ref 98–107)
CO2 SERPL-SCNC: 25 MMOL/L — SIGNIFICANT CHANGE UP (ref 22–29)
CREAT SERPL-MCNC: 1.08 MG/DL — SIGNIFICANT CHANGE UP (ref 0.5–1.3)
EOSINOPHIL # BLD AUTO: 0 K/UL — SIGNIFICANT CHANGE UP (ref 0–0.5)
EOSINOPHIL NFR BLD AUTO: 0 % — SIGNIFICANT CHANGE UP (ref 0–6)
GLUCOSE SERPL-MCNC: 104 MG/DL — HIGH (ref 70–99)
HCT VFR BLD CALC: 40 % — SIGNIFICANT CHANGE UP (ref 39–50)
HGB BLD-MCNC: 13 G/DL — SIGNIFICANT CHANGE UP (ref 13–17)
IMM GRANULOCYTES NFR BLD AUTO: 0.7 % — SIGNIFICANT CHANGE UP (ref 0–1.5)
LYMPHOCYTES # BLD AUTO: 0.62 K/UL — LOW (ref 1–3.3)
LYMPHOCYTES # BLD AUTO: 5.5 % — LOW (ref 13–44)
MCHC RBC-ENTMCNC: 29.9 PG — SIGNIFICANT CHANGE UP (ref 27–34)
MCHC RBC-ENTMCNC: 32.5 GM/DL — SIGNIFICANT CHANGE UP (ref 32–36)
MCV RBC AUTO: 92 FL — SIGNIFICANT CHANGE UP (ref 80–100)
MONOCYTES # BLD AUTO: 0.44 K/UL — SIGNIFICANT CHANGE UP (ref 0–0.9)
MONOCYTES NFR BLD AUTO: 3.9 % — SIGNIFICANT CHANGE UP (ref 2–14)
NEUTROPHILS # BLD AUTO: 10.15 K/UL — HIGH (ref 1.8–7.4)
NEUTROPHILS NFR BLD AUTO: 89.7 % — HIGH (ref 43–77)
PLATELET # BLD AUTO: 326 K/UL — SIGNIFICANT CHANGE UP (ref 150–400)
POTASSIUM SERPL-MCNC: 4.2 MMOL/L — SIGNIFICANT CHANGE UP (ref 3.5–5.3)
POTASSIUM SERPL-SCNC: 4.2 MMOL/L — SIGNIFICANT CHANGE UP (ref 3.5–5.3)
PROT SERPL-MCNC: 6.3 G/DL — LOW (ref 6.6–8.7)
PROT SERPL-MCNC: 6.4 G/DL — LOW (ref 6.6–8.7)
RBC # BLD: 4.35 M/UL — SIGNIFICANT CHANGE UP (ref 4.2–5.8)
RBC # FLD: 16.1 % — HIGH (ref 10.3–14.5)
SODIUM SERPL-SCNC: 135 MMOL/L — SIGNIFICANT CHANGE UP (ref 135–145)
WBC # BLD: 11.31 K/UL — HIGH (ref 3.8–10.5)
WBC # FLD AUTO: 11.31 K/UL — HIGH (ref 3.8–10.5)

## 2020-11-20 PROCEDURE — 99233 SBSQ HOSP IP/OBS HIGH 50: CPT | Mod: CS

## 2020-11-20 PROCEDURE — 99232 SBSQ HOSP IP/OBS MODERATE 35: CPT | Mod: CS

## 2020-11-20 RX ADMIN — Medication 1 DROP(S): at 18:00

## 2020-11-20 RX ADMIN — Medication 81 MILLIGRAM(S): at 12:36

## 2020-11-20 RX ADMIN — SENNA PLUS 1 TABLET(S): 8.6 TABLET ORAL at 21:51

## 2020-11-20 RX ADMIN — Medication 500 MILLIGRAM(S): at 12:36

## 2020-11-20 RX ADMIN — ENOXAPARIN SODIUM 40 MILLIGRAM(S): 100 INJECTION SUBCUTANEOUS at 17:57

## 2020-11-20 RX ADMIN — REMDESIVIR 500 MILLIGRAM(S): 5 INJECTION INTRAVENOUS at 12:36

## 2020-11-20 RX ADMIN — Medication 300 MILLIGRAM(S): at 12:36

## 2020-11-20 RX ADMIN — ENOXAPARIN SODIUM 40 MILLIGRAM(S): 100 INJECTION SUBCUTANEOUS at 05:57

## 2020-11-20 RX ADMIN — Medication 6 MILLIGRAM(S): at 05:57

## 2020-11-20 NOTE — PROGRESS NOTE ADULT - ASSESSMENT
74 yo  obese male from home, lives with wife, with PMHs of CAD s/p multiple stents, BEV, HLD, diverticulitis, psoriasis and gout presented with SOB. Per patient, he was tested positive for covid last Tuesday.  Was seen at urgent care and prescribed 7 days of azithromycin.. For the past 2-3 days noted to have  chills and fatigue.  Today developed SOB, hence came to ED. On admission to ED SpO2 85% on room air, COVId19 +.    COVID 19 + infection  Viral pneumonia  Acute hypoxic respiratory failure    - patient is on VM 50% currently feeling a little better  - d dimer wnl  - CRP 13. PCT 0.17  - AST/ALT uptrending-will c/w monitoring for now, may need to hold remdesivir  - c/w remdesivir for now  - decadron 6 mg daily while on remdesivir  - Avoid antibiotics unless there is a concern for a bacterial infection  - VTE prophylaxis per current [facility] COVID 19 protocol  - Check CBC with diff, CMP with LFT's, Inflammatory markers ( CRP, Ferritin, D dimer)  q48h.    - Encourage self proning q2h and ambulation, incentive spirometry as tolerated  - Taper off O2 as tolerated- once tolerating room air would ideally monitor for 24h prior to discharge.  - Inpatient Contact/Airborne isolation         Discussed with team, pharmacy  Will follow    74 yo  obese male from home, lives with wife, with PMHs of CAD s/p multiple stents, BEV, HLD, diverticulitis, psoriasis and gout presented with SOB. Per patient, he was tested positive for covid last Tuesday.  Was seen at urgent care and prescribed 7 days of azithromycin.. For the past 2-3 days noted to have  chills and fatigue.  Today developed SOB, hence came to ED. On admission to ED SpO2 85% on room air, COVId19 +.    COVID 19 + infection  Viral pneumonia  Acute hypoxic respiratory failure    - patient is on VM 50% currently feeling a little better  - d dimer wnl  - CRP 13. PCT 0.17  - AST/ALT uptrending-will c/w monitoring for now, may need to hold remdesivir  - c/w remdesivir for now  - decadron 6 mg daily while on remdesivir  - Avoid antibiotics unless there is a concern for a bacterial infection  - VTE prophylaxis per current [facility] COVID 19 protocol  - Check CBC with diff, CMP with LFT's daily, Inflammatory markers ( CRP, Ferritin, D dimer)  q48h.    - Encourage self proning q2h and ambulation, incentive spirometry as tolerated  - Taper off O2 as tolerated- once tolerating room air would ideally monitor for 24h prior to discharge.  - Inpatient Contact/Airborne isolation         Discussed with team, pharmacy  Will follow

## 2020-11-20 NOTE — PROGRESS NOTE ADULT - ASSESSMENT
74 y/o M w/ a PMH of CAD, gout, obesity comes in c/o worsening SOB w/ associated chills.  Pt was found to be COVID + in the community and COVID PCR in hospital also +.  ID consulted, pt on decadron and started on Remdesivire.  Pt has had increased requirement in O2 since admission.  Now on 50% VM and saturating 90-93%.      Acute hypoxic respiratory failure 2/2 COVID PNA  - Continues requiring 50% VM and saturating 90-93%.    - Will titrate off supplemental O2 as tolerated.   - COVID PCR + and elevated inflammatory markers on admission  - CXR showed enlarged  heart with lingular infiltrate  - Dexamethazone 6 mg IV daily x 5 days   - c/w Remdesivire.    - Mild transaminitis could be 2/2 remdesivire.  If LFTs continue to trend up may need to place remdesivire on hold.    - ID following  - c/w Vitamin C, thiamine and zinc  - MDI q4h and Tylenol for fever   - Encouraged frequent proning and c/w incentive spirometry       Hx of CAD  - c/w aspirin and atorvastatin       Hx of gout  - c/w Allopurinol 300 mg po daily       DVT ppx:  - Enoxaparin 40 mg sc q12h given COVID status        Dispo: Pt remains acute and requiring supplemental O2 however anticipate possible d/c home in 3-5 days.

## 2020-11-20 NOTE — PROGRESS NOTE ADULT - SUBJECTIVE AND OBJECTIVE BOX
Chief Complaint:      SUBJECTIVE / OVERNIGHT EVENTS:    Patient denies chest pain, SOB, abd pain, N/V, fever, chills, dysuria or any other complaints. All remainder ROS negative.       I&O's Summary    19 Nov 2020 07:01  -  20 Nov 2020 07:00  --------------------------------------------------------  IN: 0 mL / OUT: 250 mL / NET: -250 mL          PHYSICAL EXAM:  Vital Signs Last 24 Hrs  T(C): 36.6 (20 Nov 2020 08:07), Max: 37.1 (20 Nov 2020 00:27)  T(F): 97.9 (20 Nov 2020 08:07), Max: 98.7 (20 Nov 2020 00:27)  HR: 88 (20 Nov 2020 08:07) (68 - 88)  BP: 133/91 (20 Nov 2020 08:07) (117/73 - 136/82)  BP(mean): --  RR: 20 (20 Nov 2020 08:07) (19 - 20)  SpO2: 92% (20 Nov 2020 08:07) (88% - 94%)      CONSTITUTIONAL: pt examined bedside, laying comfortably in bed in NAD  HEENT: NC/AT, moist oral mucosa, clear conjunctiva, sclera nonicteric, EOMI  RESPIRATORY: Normal respiratory effort; CTA b/l, no wheezing, rhonchi, rales  CARDIOVASCULAR: RRR, normal S1 and S2, no murmur/rub/gallop  ABDOMEN: soft, NT/ND, normoactive bowel sounds, no rebound/guarding, no HSM  MUSCLOSKELETAL:  no joint swelling or tenderness to palpation  EXTREMITIES: No cynaosis, no clubbing, no lower extremity edema; Peripheral pulses are 2+ bilaterally  PSYCH: affect appropriate and cooperative  NEUROLOGY: A+O to person, place, and time, no focal neurologic deficits appreciated   SKIN: No rashes or no palpable lesions        LABS:                        13.0   11.31 )-----------( 326      ( 20 Nov 2020 08:00 )             40.0     11-20    135  |  99  |  28.0<H>  ----------------------------<  104<H>  4.2   |  25.0  |  1.08    Ca    8.5<L>      20 Nov 2020 08:00    TPro  6.4<L>  /  Alb  3.2<L>  /  TBili  0.4  /  DBili  0.1  /  AST  72<H>  /  ALT  55<H>  /  AlkPhos  48  11-20    PT/INR - ( 18 Nov 2020 14:00 )   PT: 14.0 sec;   INR: 1.22 ratio         PTT - ( 18 Nov 2020 14:00 )  PTT:34.0 sec  CARDIAC MARKERS ( 18 Nov 2020 14:00 )  x     / <0.01 ng/mL / x     / x     / x              CAPILLARY BLOOD GLUCOSE            RADIOLOGY & ADDITIONAL TESTS:          MEDICATIONS  (STANDING):  allopurinol 300 milliGRAM(s) Oral daily  ascorbic acid 500 milliGRAM(s) Oral daily  aspirin  chewable 81 milliGRAM(s) Oral daily  dexAMETHasone  Injectable 6 milliGRAM(s) IV Push daily  enoxaparin Injectable 40 milliGRAM(s) SubCutaneous every 12 hours  influenza   Vaccine 0.5 milliLiter(s) IntraMuscular once  remdesivir  IVPB   IV Intermittent   remdesivir  IVPB 100 milliGRAM(s) IV Intermittent every 24 hours  senna 8.6 milliGRAM(s) Oral Tablet - Peds 1 Tablet(s) Oral at bedtime    MEDICATIONS  (PRN):  acetaminophen   Tablet .. 650 milliGRAM(s) Oral every 4 hours PRN Temp greater or equal to 38.5C (101.3F)  ALBUTerol    90 MICROgram(s) HFA Inhaler 2 Puff(s) Inhalation every 4 hours PRN Shortness of Breath and/or Wheezing                                     Chief Complaint:  sob    SUBJECTIVE / OVERNIGHT EVENTS: Pt continues requiring VM 50%.  Pt reports feeling better and offers no acute complaints.     Patient denies chest pain, SOB, abd pain, N/V, fever, chills, dysuria or any other complaints. All remainder ROS negative.       I&O's Summary    19 Nov 2020 07:01  -  20 Nov 2020 07:00  --------------------------------------------------------  IN: 0 mL / OUT: 250 mL / NET: -250 mL          PHYSICAL EXAM:  Vital Signs Last 24 Hrs  T(C): 36.6 (20 Nov 2020 08:07), Max: 37.1 (20 Nov 2020 00:27)  T(F): 97.9 (20 Nov 2020 08:07), Max: 98.7 (20 Nov 2020 00:27)  HR: 88 (20 Nov 2020 08:07) (68 - 88)  BP: 133/91 (20 Nov 2020 08:07) (117/73 - 136/82)  BP(mean): --  RR: 20 (20 Nov 2020 08:07) (19 - 20)  SpO2: 92% (20 Nov 2020 08:07) (88% - 94%)      CONSTITUTIONAL: pt examined bedside, laying comfortably in bed in NAD  HEENT: NC/AT, dry oral mucosa, clear conjunctiva, sclera nonicteric, EOMI  RESPIRATORY: Normal respiratory effort; CTA b/l, no wheezing, rhonchi, rales  CARDIOVASCULAR: RRR, normal S1 and S2, no murmur/rub/gallop  ABDOMEN: soft, obese abdomen, NT/ND, normoactive bowel sounds, no rebound/guarding   EXTREMITIES: No cynaosis, no clubbing, no lower extremity edema; Peripheral pulses are 2+ bilaterally  PSYCH: affect appropriate and cooperative  NEUROLOGY: A+O to person, place, and time, no focal neurologic deficits appreciated   SKIN: No rashes or no palpable lesions        LABS:                        13.0   11.31 )-----------( 326      ( 20 Nov 2020 08:00 )             40.0     11-20    135  |  99  |  28.0<H>  ----------------------------<  104<H>  4.2   |  25.0  |  1.08    Ca    8.5<L>      20 Nov 2020 08:00    TPro  6.4<L>  /  Alb  3.2<L>  /  TBili  0.4  /  DBili  0.1  /  AST  72<H>  /  ALT  55<H>  /  AlkPhos  48  11-20    PT/INR - ( 18 Nov 2020 14:00 )   PT: 14.0 sec;   INR: 1.22 ratio         PTT - ( 18 Nov 2020 14:00 )  PTT:34.0 sec  CARDIAC MARKERS ( 18 Nov 2020 14:00 )  x     / <0.01 ng/mL / x     / x     / x              CAPILLARY BLOOD GLUCOSE            RADIOLOGY & ADDITIONAL TESTS:          MEDICATIONS  (STANDING):  allopurinol 300 milliGRAM(s) Oral daily  ascorbic acid 500 milliGRAM(s) Oral daily  aspirin  chewable 81 milliGRAM(s) Oral daily  dexAMETHasone  Injectable 6 milliGRAM(s) IV Push daily  enoxaparin Injectable 40 milliGRAM(s) SubCutaneous every 12 hours  influenza   Vaccine 0.5 milliLiter(s) IntraMuscular once  remdesivir  IVPB   IV Intermittent   remdesivir  IVPB 100 milliGRAM(s) IV Intermittent every 24 hours  senna 8.6 milliGRAM(s) Oral Tablet - Peds 1 Tablet(s) Oral at bedtime    MEDICATIONS  (PRN):  acetaminophen   Tablet .. 650 milliGRAM(s) Oral every 4 hours PRN Temp greater or equal to 38.5C (101.3F)  ALBUTerol    90 MICROgram(s) HFA Inhaler 2 Puff(s) Inhalation every 4 hours PRN Shortness of Breath and/or Wheezing

## 2020-11-20 NOTE — PROGRESS NOTE ADULT - SUBJECTIVE AND OBJECTIVE BOX
[facility]  INFECTIOUS DISEASES AND INTERNAL MEDICINE at [place]  =======================================================  Pablo Villagran MD  Diplomates American Board of Internal Medicine and Infectious Diseases  Tel: [phone]      Fax: [phone]  =======================================================      [id number]  DONTA CRABTREE is a 75y  Male     ID follow up- covid 19+  still on VM  feels better today and breathing has improved    Social Hx: non smoker    FAMILY HISTORY:  FH: myocardial infarction (Father)  1989 at age of 78    Family history of essential hypertension (Father)  father        Allergies    No Known Allergies    Intolerances        MEDICATIONS  (STANDING):  allopurinol 300 milliGRAM(s) Oral daily  ascorbic acid 500 milliGRAM(s) Oral daily  aspirin  chewable 81 milliGRAM(s) Oral daily  dexAMETHasone  Injectable 6 milliGRAM(s) IV Push daily  enoxaparin Injectable 40 milliGRAM(s) SubCutaneous every 12 hours  influenza   Vaccine 0.5 milliLiter(s) IntraMuscular once  remdesivir  IVPB   IV Intermittent   senna 8.6 milliGRAM(s) Oral Tablet - Peds 1 Tablet(s) Oral at bedtime    MEDICATIONS  (PRN):  acetaminophen   Tablet .. 650 milliGRAM(s) Oral every 4 hours PRN Temp greater or equal to 38.5C (101.3F)  ALBUTerol    90 MICROgram(s) HFA Inhaler 2 Puff(s) Inhalation every 4 hours PRN Shortness of Breath and/or Wheezing      ANTIMICROBIALS:  remdesivir  IVPB        OTHER MEDS: MEDICATIONS  (STANDING):  acetaminophen   Tablet .. 650 every 4 hours PRN  ALBUTerol    90 MICROgram(s) HFA Inhaler 2 every 4 hours PRN  allopurinol 300 daily  aspirin  chewable 81 daily  dexAMETHasone  Injectable 6 daily  enoxaparin Injectable 40 every 12 hours  influenza   Vaccine 0.5 once  senna 8.6 milliGRAM(s) Oral Tablet - Peds 1 at bedtime             REVIEW OF SYSTEMS:  CONSTITUTIONAL:  No Fever or chills  HEENT:  No diplopia or blurred vision.  No earache, sore throat or runny nose.  CARDIOVASCULAR:  No pressure, squeezing, strangling, tightness, heaviness or aching about the chest, neck, axilla or epigastrium.  RESPIRATORY:  + cough, shortness of breath  GASTROINTESTINAL:  No nausea, vomiting or diarrhea.  GENITOURINARY:  No dysuria, frequency or urgency. No Blood in urine  MUSCULOSKELETAL:  no joint aches, no muscle pain  SKIN:  No change in skin, hair or nails.  NEUROLOGIC:  No Headaches, seizures or weakness.  PSYCHIATRIC:  No disorder of thought or mood.  ENDOCRINE:  No heat or cold intolerance  HEMATOLOGICAL:  No easy bruising or bleeding.           I&O's Detail    18 Nov 2020 07:01  -  19 Nov 2020 07:00  --------------------------------------------------------  IN:  Total IN: 0 mL    OUT:    Voided (mL): 300 mL  Total OUT: 300 mL    Total NET: -300 mL            Physical Exam:  Vital Signs Last 24 Hrs  T(C): 37.2 (19 Nov 2020 08:11), Max: 37.8 (18 Nov 2020 12:57)  T(F): 98.9 (19 Nov 2020 08:11), Max: 100 (18 Nov 2020 12:57)  HR: 74 (19 Nov 2020 08:11) (74 - 97)  BP: 144/86 (19 Nov 2020 08:11) (116/75 - 145/82)  BP(mean): --  RR: 20 (19 Nov 2020 08:11) (20 - 24)  SpO2: 92% (19 Nov 2020 08:11) (84% - 94%)  Height (cm): 175.3 (11-18 @ 12:57)  Weight (kg): 99.8 (11-18 @ 12:57)  BMI (kg/m2): 32.5 (11-18 @ 12:57)  BSA (m2): 2.15 (11-18 @ 12:57)  GEN: NAD, pleasant on VM  HEENT: normocephalic and atraumatic. EOMI. PERRL.  Anicteric  NECK: Supple.   LUNGS: Crackles to auscultation.  HEART: Regular rate and rhythm without murmur.  ABDOMEN: Soft, nontender, and nondistended.  Positive bowel sounds.    : No CVA tenderness  EXTREMITIES: Without any edema.  MSK: No joint swelling  NEUROLOGIC: Cranial nerves II through XII are grossly intact. No Focal Deficits  PSYCHIATRIC: Appropriate affect .  SKIN: No Rash        Labs:                                          13.0   11.31 )-----------( 326      ( 20 Nov 2020 08:00 )             40.0   11-20    135  |  99  |  28.0<H>  ----------------------------<  104<H>  4.2   |  25.0  |  1.08    Ca    8.5<L>      20 Nov 2020 08:00    TPro  6.4<L>  /  Alb  3.2<L>  /  TBili  0.4  /  DBili  0.1  /  AST  72<H>  /  ALT  55<H>  /  AlkPhos  48  11-20        Radiology:  < from: Xray Chest 1 View- PORTABLE-Urgent (11.18.20 @ 14:38) >  INTERPRETATION:  AP chest on November 18, 2020 at 2:12 PM. Patient is short of breath with cough and fever.    Heart is likely enlarged.    Present film shows a lingular infiltrate new since May 26, 2018.    IMPRESSION: Heart enlargement again noted. Lingular infiltrate at this time.      < end of copied text >

## 2020-11-21 LAB
ALBUMIN SERPL ELPH-MCNC: 3.2 G/DL — LOW (ref 3.3–5.2)
ALBUMIN SERPL ELPH-MCNC: 3.4 G/DL — SIGNIFICANT CHANGE UP (ref 3.3–5.2)
ALP SERPL-CCNC: 55 U/L — SIGNIFICANT CHANGE UP (ref 40–120)
ALP SERPL-CCNC: 56 U/L — SIGNIFICANT CHANGE UP (ref 40–120)
ALT FLD-CCNC: 115 U/L — HIGH
ALT FLD-CCNC: 117 U/L — HIGH
ANION GAP SERPL CALC-SCNC: 12 MMOL/L — SIGNIFICANT CHANGE UP (ref 5–17)
AST SERPL-CCNC: 110 U/L — HIGH
AST SERPL-CCNC: 111 U/L — HIGH
BASOPHILS # BLD AUTO: 0.01 K/UL — SIGNIFICANT CHANGE UP (ref 0–0.2)
BASOPHILS NFR BLD AUTO: 0.1 % — SIGNIFICANT CHANGE UP (ref 0–2)
BILIRUB DIRECT SERPL-MCNC: 0.2 MG/DL — SIGNIFICANT CHANGE UP (ref 0–0.3)
BILIRUB INDIRECT FLD-MCNC: 0.4 MG/DL — SIGNIFICANT CHANGE UP (ref 0.2–1)
BILIRUB SERPL-MCNC: 0.6 MG/DL — SIGNIFICANT CHANGE UP (ref 0.4–2)
BILIRUB SERPL-MCNC: 0.6 MG/DL — SIGNIFICANT CHANGE UP (ref 0.4–2)
BUN SERPL-MCNC: 28 MG/DL — HIGH (ref 8–20)
CALCIUM SERPL-MCNC: 8.4 MG/DL — LOW (ref 8.6–10.2)
CHLORIDE SERPL-SCNC: 100 MMOL/L — SIGNIFICANT CHANGE UP (ref 98–107)
CO2 SERPL-SCNC: 24 MMOL/L — SIGNIFICANT CHANGE UP (ref 22–29)
CREAT SERPL-MCNC: 1.06 MG/DL — SIGNIFICANT CHANGE UP (ref 0.5–1.3)
EOSINOPHIL # BLD AUTO: 0 K/UL — SIGNIFICANT CHANGE UP (ref 0–0.5)
EOSINOPHIL NFR BLD AUTO: 0 % — SIGNIFICANT CHANGE UP (ref 0–6)
GLUCOSE SERPL-MCNC: 98 MG/DL — SIGNIFICANT CHANGE UP (ref 70–99)
HCT VFR BLD CALC: 40.8 % — SIGNIFICANT CHANGE UP (ref 39–50)
HGB BLD-MCNC: 13 G/DL — SIGNIFICANT CHANGE UP (ref 13–17)
IMM GRANULOCYTES NFR BLD AUTO: 1 % — SIGNIFICANT CHANGE UP (ref 0–1.5)
LYMPHOCYTES # BLD AUTO: 0.53 K/UL — LOW (ref 1–3.3)
LYMPHOCYTES # BLD AUTO: 5.1 % — LOW (ref 13–44)
MCHC RBC-ENTMCNC: 29.2 PG — SIGNIFICANT CHANGE UP (ref 27–34)
MCHC RBC-ENTMCNC: 31.9 GM/DL — LOW (ref 32–36)
MCV RBC AUTO: 91.7 FL — SIGNIFICANT CHANGE UP (ref 80–100)
MONOCYTES # BLD AUTO: 0.45 K/UL — SIGNIFICANT CHANGE UP (ref 0–0.9)
MONOCYTES NFR BLD AUTO: 4.3 % — SIGNIFICANT CHANGE UP (ref 2–14)
NEUTROPHILS # BLD AUTO: 9.34 K/UL — HIGH (ref 1.8–7.4)
NEUTROPHILS NFR BLD AUTO: 89.5 % — HIGH (ref 43–77)
PLATELET # BLD AUTO: 285 K/UL — SIGNIFICANT CHANGE UP (ref 150–400)
POTASSIUM SERPL-MCNC: 4.1 MMOL/L — SIGNIFICANT CHANGE UP (ref 3.5–5.3)
POTASSIUM SERPL-SCNC: 4.1 MMOL/L — SIGNIFICANT CHANGE UP (ref 3.5–5.3)
PROT SERPL-MCNC: 6.4 G/DL — LOW (ref 6.6–8.7)
PROT SERPL-MCNC: 6.5 G/DL — LOW (ref 6.6–8.7)
RBC # BLD: 4.45 M/UL — SIGNIFICANT CHANGE UP (ref 4.2–5.8)
RBC # FLD: 16.1 % — HIGH (ref 10.3–14.5)
SODIUM SERPL-SCNC: 136 MMOL/L — SIGNIFICANT CHANGE UP (ref 135–145)
WBC # BLD: 10.43 K/UL — SIGNIFICANT CHANGE UP (ref 3.8–10.5)
WBC # FLD AUTO: 10.43 K/UL — SIGNIFICANT CHANGE UP (ref 3.8–10.5)

## 2020-11-21 PROCEDURE — 99232 SBSQ HOSP IP/OBS MODERATE 35: CPT | Mod: CS

## 2020-11-21 RX ORDER — UBIDECARENONE 100 MG
1 CAPSULE ORAL
Qty: 0 | Refills: 0 | DISCHARGE

## 2020-11-21 RX ORDER — SENNA PLUS 8.6 MG/1
1 TABLET ORAL
Qty: 0 | Refills: 0 | DISCHARGE

## 2020-11-21 RX ORDER — EZETIMIBE 10 MG/1
1 TABLET ORAL
Qty: 0 | Refills: 0 | DISCHARGE

## 2020-11-21 RX ORDER — SODIUM CHLORIDE 0.65 %
1 AEROSOL, SPRAY (ML) NASAL
Refills: 0 | Status: DISCONTINUED | OUTPATIENT
Start: 2020-11-21 | End: 2020-12-04

## 2020-11-21 RX ORDER — TADALAFIL 10 MG/1
1 TABLET, FILM COATED ORAL
Qty: 0 | Refills: 0 | DISCHARGE

## 2020-11-21 RX ORDER — ASPIRIN/CALCIUM CARB/MAGNESIUM 324 MG
0 TABLET ORAL
Qty: 0 | Refills: 0 | DISCHARGE

## 2020-11-21 RX ADMIN — Medication 1 SPRAY(S): at 14:28

## 2020-11-21 RX ADMIN — Medication 6 MILLIGRAM(S): at 05:40

## 2020-11-21 RX ADMIN — REMDESIVIR 500 MILLIGRAM(S): 5 INJECTION INTRAVENOUS at 11:20

## 2020-11-21 RX ADMIN — ENOXAPARIN SODIUM 40 MILLIGRAM(S): 100 INJECTION SUBCUTANEOUS at 18:09

## 2020-11-21 RX ADMIN — SENNA PLUS 1 TABLET(S): 8.6 TABLET ORAL at 21:58

## 2020-11-21 RX ADMIN — ALBUTEROL 2 PUFF(S): 90 AEROSOL, METERED ORAL at 15:44

## 2020-11-21 RX ADMIN — Medication 500 MILLIGRAM(S): at 11:20

## 2020-11-21 RX ADMIN — Medication 300 MILLIGRAM(S): at 11:21

## 2020-11-21 RX ADMIN — Medication 1 DROP(S): at 18:10

## 2020-11-21 RX ADMIN — Medication 1 DROP(S): at 05:40

## 2020-11-21 RX ADMIN — Medication 81 MILLIGRAM(S): at 11:21

## 2020-11-21 RX ADMIN — ENOXAPARIN SODIUM 40 MILLIGRAM(S): 100 INJECTION SUBCUTANEOUS at 05:40

## 2020-11-21 NOTE — PROGRESS NOTE ADULT - SUBJECTIVE AND OBJECTIVE BOX
[facility]  INFECTIOUS DISEASES AND INTERNAL MEDICINE at [place]  =======================================================  Pablo Villagran MD  Diplomates American Board of Internal Medicine and Infectious Diseases  Tel: [phone]      Fax: [phone]  =======================================================      [id number]  DONTA CRABTREE is a 75y  Male   ID follow up- covid 19+    now on high flow oxygen          Social Hx: non smoker    FAMILY HISTORY:  FH: myocardial infarction (Father)  1989 at age of 78    Family history of essential hypertension (Father)  father        Allergies    No Known Allergies    Intolerances              REVIEW OF SYSTEMS:  CONSTITUTIONAL:  No Fever or chills  HEENT:  No diplopia or blurred vision.  No earache, sore throat or runny nose.  CARDIOVASCULAR:  No pressure, squeezing, strangling, tightness, heaviness or aching about the chest, neck, axilla or epigastrium.  RESPIRATORY:  + cough, shortness of breath  GASTROINTESTINAL:  No nausea, vomiting or diarrhea.  GENITOURINARY:  No dysuria, frequency or urgency. No Blood in urine  MUSCULOSKELETAL:  no joint aches, no muscle pain  SKIN:  No change in skin, hair or nails.  NEUROLOGIC:  No Headaches, seizures or weakness.  PSYCHIATRIC:  No disorder of thought or mood.  ENDOCRINE:  No heat or cold intolerance  HEMATOLOGICAL:  No easy bruising or bleeding.           I&O's Detail           Physical Exam:  Vital Signs Last 24 Hrs      GEN: NAD, pleasant on HF nasal cannula,   HEENT: normocephalic and atraumatic. EOMI. PERRL.  Anicteric  NECK: Supple.   LUNGS: Crackles to auscultation.  HEART: Regular rate and rhythm without murmur.  ABDOMEN: Soft, nontender, and nondistended.  Positive bowel sounds.    : No CVA tenderness  EXTREMITIES: Without any edema.  MSK: No joint swelling  NEUROLOGIC: Cranial nerves II through XII are grossly intact. No Focal Deficits  PSYCHIATRIC: Appropriate affect .  SKIN: No Rash        Labs:             Vitals:  ============  T(F): 97.8 (21 Nov 2020 05:09), Max: 98.8 (21 Nov 2020 00:35)  HR: 78 (21 Nov 2020 08:15)  BP: 150/88 (21 Nov 2020 05:09)  RR: 20 (21 Nov 2020 08:00)  SpO2: 94% (21 Nov 2020 08:15) (91% - 96%)  temp max in last 48H T(F): , Max: 98.8 (11-21-20 @ 00:35)    =======================================================  Current Antibiotics:  remdesivir  IVPB   IV Intermittent   remdesivir  IVPB 100 milliGRAM(s) IV Intermittent every 24 hours    Other medications:  allopurinol 300 milliGRAM(s) Oral daily  artificial tears (preservative free) Ophthalmic Solution 1 Drop(s) Both EYES two times a day  ascorbic acid 500 milliGRAM(s) Oral daily  aspirin  chewable 81 milliGRAM(s) Oral daily  dexAMETHasone  Injectable 6 milliGRAM(s) IV Push daily  enoxaparin Injectable 40 milliGRAM(s) SubCutaneous every 12 hours  influenza   Vaccine 0.5 milliLiter(s) IntraMuscular once  senna 8.6 milliGRAM(s) Oral Tablet - Peds 1 Tablet(s) Oral at bedtime      =======================================================  Labs:                        13.0   10.43 )-----------( 285      ( 21 Nov 2020 09:07 )             40.8      11-21    136  |  100  |  28.0<H>  ----------------------------<  98  4.1   |  24.0  |  1.06    Ca    8.4<L>      21 Nov 2020 09:10    TPro  6.5<L>  /  Alb  3.4  /  TBili  0.6  /  DBili  x   /  AST  110<H>  /  ALT  115<H>  /  AlkPhos  56  11-21      Creatinine, Serum: 1.06 mg/dL (11-21-20 @ 09:10)  Creatinine, Serum: 1.08 mg/dL (11-20-20 @ 08:00)  Creatinine, Serum: 1.11 mg/dL (11-18-20 @ 14:00)    Procalcitonin, Serum: 0.17 ng/mL (11-18-20 @ 14:00)    D-Dimer Assay, Quantitative: 183 ng/mL DDU (11-18-20 @ 14:00)    Ferritin, Serum: 155 ng/mL (11-18-20 @ 14:00)    C-Reactive Protein, Serum: 13.12 mg/dL (11-18-20 @ 14:00)    WBC Count: 10.43 K/uL (11-21-20 @ 09:07)  WBC Count: 11.31 K/uL (11-20-20 @ 08:00)  WBC Count: 5.44 K/uL (11-18-20 @ 14:00)      COVID-19 IgG Antibody Interpretation: Negative (11-19-20 @ 16:36)  COVID-19 IgG Antibody Index: 0.37 Index (11-19-20 @ 16:36)  COVID-19 PCR: Detected (11-18-20 @ 13:33)      Alkaline Phosphatase, Serum: 56 U/L (11-21-20 @ 09:10)  Alkaline Phosphatase, Serum: 55 U/L (11-21-20 @ 09:09)  Alkaline Phosphatase, Serum: 48 U/L (11-20-20 @ 08:00)  Alkaline Phosphatase, Serum: 50 U/L (11-20-20 @ 08:00)  Alkaline Phosphatase, Serum: 44 U/L (11-18-20 @ 14:00)  Alanine Aminotransferase (ALT/SGPT): 115 U/L (11-21-20 @ 09:10)  Alanine Aminotransferase (ALT/SGPT): 117 U/L (11-21-20 @ 09:09)  Alanine Aminotransferase (ALT/SGPT): 55 U/L (11-20-20 @ 08:00)  Alanine Aminotransferase (ALT/SGPT): 55 U/L (11-20-20 @ 08:00)  Alanine Aminotransferase (ALT/SGPT): 29 U/L (11-18-20 @ 14:00)  Aspartate Aminotransferase (AST/SGOT): 110 U/L (11-21-20 @ 09:10)  Aspartate Aminotransferase (AST/SGOT): 111 U/L (11-21-20 @ 09:09)  Aspartate Aminotransferase (AST/SGOT): 72 U/L (11-20-20 @ 08:00)  Aspartate Aminotransferase (AST/SGOT): 71 U/L (11-20-20 @ 08:00)  Aspartate Aminotransferase (AST/SGOT): 48 U/L (11-18-20 @ 14:00)  Bilirubin Total, Serum: 0.6 mg/dL (11-21-20 @ 09:10)  Bilirubin Total, Serum: 0.6 mg/dL (11-21-20 @ 09:09)  Bilirubin Total, Serum: 0.4 mg/dL (11-20-20 @ 08:00)  Bilirubin Total, Serum: 0.4 mg/dL (11-20-20 @ 08:00)  Bilirubin Total, Serum: 0.4 mg/dL (11-18-20 @ 14:00)  Bilirubin Direct, Serum: 0.2 mg/dL (11-21-20 @ 09:09)  Bilirubin Direct, Serum: 0.1 mg/dL (11-20-20 @ 08:00)    Radiology:  < from: Xray Chest 1 View- PORTABLE-Urgent (11.18.20 @ 14:38) >  INTERPRETATION:  AP chest on November 18, 2020 at 2:12 PM. Patient is short of breath with cough and fever.    Heart is likely enlarged.    Present film shows a lingular infiltrate new since May 26, 2018.    IMPRESSION: Heart enlargement again noted. Lingular infiltrate at this time.      < end of copied text >

## 2020-11-21 NOTE — PROGRESS NOTE ADULT - ASSESSMENT
76 y/o M w/ a PMH of CAD, gout, obesity comes in c/o worsening SOB w/ associated chills.  Pt was found to be COVID + in the community and COVID PCR in hospital also +.  ID consulted, pt on decadron and started on Remdesivire.  Pt has had increased requirement in O2 since admission.  Now on high flow ox sat is stable over 90     1-Acute hypoxic respiratory failure 2/2 COVID PNA  ox requirement up this am due to low ox sat   mj high flow titrate as tolerate   proning , incemtive spirometry encouraged   - COVID PCR + and elevated inflammatory markers on admission  cont - Dexamethazone 6 mg IV daily x 5 days  and remdesivire   - ID following  - c/w Vitamin C, thiamine and zinc  - MDI q4h and Tylenol for fever   repeat markers D dimer , LFTs , CRP procalcitonin in am       3-Hx of CAD  - c/w aspirin and atorvastatin       4-Hx of gout  - c/w Allopurinol 300 mg po daily       DVT ppx:  - Enoxaparin 40 mg sc q12h given COVID status        Dispo: Pt remains acute and requiring high supplemental O2

## 2020-11-21 NOTE — DIETITIAN INITIAL EVALUATION ADULT. - PERTINENT LABORATORY DATA
11-20 Na135 mmol/L Glu 104 mg/dL<H> K+ 4.2 mmol/L Cr  1.08 mg/dL BUN 28.0 mg/dL<H> Phos n/a   Alb 3.2 g/dL<L> PAB n/a     n/a  HgbA1C

## 2020-11-21 NOTE — PROGRESS NOTE ADULT - SUBJECTIVE AND OBJECTIVE BOX
Follow up for  hypoxia , COVID  infection   Pt is seen in am , he is sitting in the chair           Vital Signs Last 24 Hrs  T(C): 36.6 (21 Nov 2020 05:09), Max: 37.1 (21 Nov 2020 00:35)  T(F): 97.8 (21 Nov 2020 05:09), Max: 98.8 (21 Nov 2020 00:35)  HR: 78 (21 Nov 2020 08:15) (77 - 90)  BP: 150/88 (21 Nov 2020 05:09) (102/65 - 150/88)  BP(mean): --  RR: 20 (21 Nov 2020 08:00) (18 - 20)  SpO2: 94% (21 Nov 2020 08:15    CONSTITUTIONAL : sitting in the chair   RESPIRATORY: CTA bilateral , no wheezing   CARDIOVASCULAR: RRR, S1 and S2, no murmur/rub/gallop  ABDOMEN: soft, obese abdomen, NT/ND, normoactive bowel sounds, no rebound/guarding   EXTREMITIES: no lower extremity edema; Peripheral pulses are 2+ bilaterally  SKIN: No rashes or no palpable lesions                            13.0   10.43 )-----------( 285      ( 21 Nov 2020 09:07 )             40.8   11-21    136  |  100  |  28.0<H>  ----------------------------<  98  4.1   |  24.0  |  1.06    Ca    8.4<L>      21 Nov 2020 09:10    TPro  6.5<L>  /  Alb  3.4  /  TBili  0.6  /  DBili  x   /  AST  110<H>  /  ALT  115<H>  /  AlkPhos  56  11-21      MEDICATIONS  (STANDING):  allopurinol 300 milliGRAM(s) Oral daily  artificial tears (preservative free) Ophthalmic Solution 1 Drop(s) Both EYES two times a day  ascorbic acid 500 milliGRAM(s) Oral daily  aspirin  chewable 81 milliGRAM(s) Oral daily  dexAMETHasone  Injectable 6 milliGRAM(s) IV Push daily  enoxaparin Injectable 40 milliGRAM(s) SubCutaneous every 12 hours  influenza   Vaccine 0.5 milliLiter(s) IntraMuscular once  remdesivir  IVPB   IV Intermittent   remdesivir  IVPB 100 milliGRAM(s) IV Intermittent every 24 hours  senna 8.6 milliGRAM(s) Oral Tablet - Peds 1 Tablet(s) Oral at bedtime

## 2020-11-21 NOTE — DIETITIAN INITIAL EVALUATION ADULT. - OTHER INFO
Pt is a 75 year old obese male from home, lives with wife, with PMHx of CAD s/p multiple stents, BEV, HLD, diverticulitis, psoriasis and gout presented with SOB. Per patient, he was tested positive for covid last Tuesday.  Was seen at urgent care and prescribed 7 days of azithromycin.. For the past 2-3 days noted to have  chills and fatigue.  Today developed SOB, hence came to ED. On admission to ED SpO2 85% on room air.   Patients multiple family members were tested positive after having a recent  birthday party. Pt admitted with Acute hypoxic respiratory failure 2/2 COVID PNA  Pt tolerating PO diet at this time, noted with decreased PO intake over past 2 days per RN flow sheets.

## 2020-11-21 NOTE — PROGRESS NOTE ADULT - ASSESSMENT
74 yo  obese male from home, lives with wife, with PMHs of CAD s/p multiple stents, BEV, HLD, diverticulitis, psoriasis and gout presented with SOB. Per patient, he was tested positive for covid last Tuesday.  Was seen at urgent care and prescribed 7 days of azithromycin.. For the past 2-3 days noted to have  chills and fatigue.  Today developed SOB, hence came to ED. On admission to ED SpO2 85% on room air, COVId19 +.    COVID 19 + infection  Viral pneumonia  Acute hypoxic respiratory failure    - patient is on VM 50% currently feeling a little better  - d dimer wnl  - CRP 13. PCT 0.17  - AST/ALT uptrending-will c/w monitoring for now, may need to hold remdesivir; DUE To end on 11/23; will watch  - c/w remdesivir for now  - if remdesevir stopped, may consider offering Convalescent plasma.   - decadron 6 mg daily while on remdesivir  - Avoid antibiotics unless there is a concern for a bacterial infection  - VTE prophylaxis per current [facility] COVID 19 protocol  - Check CBC with diff, CMP with LFT's daily, Inflammatory markers ( CRP, Ferritin, D dimer)  q48h.    - Encourage self proning q2h and ambulation, incentive spirometry as tolerated  - Taper off O2 as tolerated- once tolerating room air would ideally monitor for 24h prior to discharge.  - Inpatient Contact/Airborne isolation         Discussed with team, pharmacy  Will follow

## 2020-11-22 LAB
ALBUMIN SERPL ELPH-MCNC: 3.3 G/DL — SIGNIFICANT CHANGE UP (ref 3.3–5.2)
ALP SERPL-CCNC: 56 U/L — SIGNIFICANT CHANGE UP (ref 40–120)
ALT FLD-CCNC: 99 U/L — HIGH
AST SERPL-CCNC: 62 U/L — HIGH
BILIRUB DIRECT SERPL-MCNC: 0.2 MG/DL — SIGNIFICANT CHANGE UP (ref 0–0.3)
BILIRUB INDIRECT FLD-MCNC: 0.4 MG/DL — SIGNIFICANT CHANGE UP (ref 0.2–1)
BILIRUB SERPL-MCNC: 0.6 MG/DL — SIGNIFICANT CHANGE UP (ref 0.4–2)
CREAT SERPL-MCNC: 1 MG/DL — SIGNIFICANT CHANGE UP (ref 0.5–1.3)
CRP SERPL-MCNC: 7.11 MG/DL — HIGH (ref 0–0.4)
D DIMER BLD IA.RAPID-MCNC: 215 NG/ML DDU — SIGNIFICANT CHANGE UP
FERRITIN SERPL-MCNC: 254 NG/ML — SIGNIFICANT CHANGE UP (ref 30–400)
PROCALCITONIN SERPL-MCNC: 0.12 NG/ML — HIGH (ref 0.02–0.1)
PROT SERPL-MCNC: 6.5 G/DL — LOW (ref 6.6–8.7)

## 2020-11-22 PROCEDURE — 99233 SBSQ HOSP IP/OBS HIGH 50: CPT | Mod: CS

## 2020-11-22 RX ADMIN — Medication 6 MILLIGRAM(S): at 05:54

## 2020-11-22 RX ADMIN — Medication 500 MILLIGRAM(S): at 11:03

## 2020-11-22 RX ADMIN — REMDESIVIR 500 MILLIGRAM(S): 5 INJECTION INTRAVENOUS at 11:03

## 2020-11-22 RX ADMIN — Medication 1 DROP(S): at 06:03

## 2020-11-22 RX ADMIN — ENOXAPARIN SODIUM 40 MILLIGRAM(S): 100 INJECTION SUBCUTANEOUS at 17:45

## 2020-11-22 RX ADMIN — SENNA PLUS 1 TABLET(S): 8.6 TABLET ORAL at 21:02

## 2020-11-22 RX ADMIN — Medication 300 MILLIGRAM(S): at 11:03

## 2020-11-22 RX ADMIN — ALBUTEROL 2 PUFF(S): 90 AEROSOL, METERED ORAL at 06:03

## 2020-11-22 RX ADMIN — Medication 81 MILLIGRAM(S): at 11:03

## 2020-11-22 RX ADMIN — Medication 1 DROP(S): at 17:45

## 2020-11-22 RX ADMIN — ENOXAPARIN SODIUM 40 MILLIGRAM(S): 100 INJECTION SUBCUTANEOUS at 05:55

## 2020-11-22 NOTE — PROGRESS NOTE ADULT - ASSESSMENT
76 y/o M w/ a PMH of CAD, gout, obesity comes in c/o worsening SOB w/ associated chills.  Pt was found to be COVID + in the community and COVID PCR in hospital also +.  ID consulted, pt on decadron and started on Remdesivire.  Pt has had increased requirement in O2 since admission.  Now on high flow ox sat is stable over 90     #Acute hypoxic respiratory failure 2/2 COVID PNA  - Inflammatory markers improved, monitor q48h  - Encourage proning, incentive spirometry, and OOB/ambulation  - C/w Remdesivir until tomorrow, monitor LFTs (improved today)  - C/w Decadron 6mg daily until tomorrow  - ID following  - c/w Vitamin C, Thiamine and Zinc  - MDI q4h and Tylenol for fever   - Supplemental oxygen prn, wean as tolerated. Currently requiring HFNC 50    #Hx of CAD  - c/w aspirin and atorvastatin     #Hx of gout  - c/w Allopurinol 300 mg po daily     DVT ppx:  - Enoxaparin 40 mg sc q12h given COVID status    Patient's wife updated regarding clinical condition.    Dispo: Pt remains acute and requiring high supplemental O2

## 2020-11-22 NOTE — PROGRESS NOTE ADULT - SUBJECTIVE AND OBJECTIVE BOX
[facility] Division of Hospital Medicine  Jarvis Aponte [phone]    Chief Complaint:  Patient is a 75y old  Male who presents with a chief complaint of SOB (21 Nov 2020 14:07)      SUBJECTIVE / OVERNIGHT EVENTS:  Patient seen and examined at bedside. No acute events reported overnight. Patient seen sitting in chair on HFNC, still with high requirements. States he feels better, no new complaints.    Patient denies chest pain, abd pain, N/V, fever, chills, dysuria or any other complaints. All remainder ROS negative.     MEDICATIONS  (STANDING):  allopurinol 300 milliGRAM(s) Oral daily  artificial tears (preservative free) Ophthalmic Solution 1 Drop(s) Both EYES two times a day  ascorbic acid 500 milliGRAM(s) Oral daily  aspirin  chewable 81 milliGRAM(s) Oral daily  dexAMETHasone  Injectable 6 milliGRAM(s) IV Push daily  enoxaparin Injectable 40 milliGRAM(s) SubCutaneous every 12 hours  influenza   Vaccine 0.5 milliLiter(s) IntraMuscular once  remdesivir  IVPB   IV Intermittent   remdesivir  IVPB 100 milliGRAM(s) IV Intermittent every 24 hours  senna 8.6 milliGRAM(s) Oral Tablet - Peds 1 Tablet(s) Oral at bedtime    MEDICATIONS  (PRN):  acetaminophen   Tablet .. 650 milliGRAM(s) Oral every 4 hours PRN Temp greater or equal to 38.5C (101.3F)  ALBUTerol    90 MICROgram(s) HFA Inhaler 2 Puff(s) Inhalation every 4 hours PRN Shortness of Breath and/or Wheezing  sodium chloride 0.65% Nasal 1 Spray(s) Both Nostrils four times a day PRN Nasal Congestion        I&O's Summary      PHYSICAL EXAM:  Vital Signs Last 24 Hrs  T(C): 37.5 (22 Nov 2020 08:31), Max: 37.5 (22 Nov 2020 08:31)  T(F): 99.5 (22 Nov 2020 08:31), Max: 99.5 (22 Nov 2020 08:31)  HR: 92 (22 Nov 2020 08:31) (74 - 92)  BP: 124/84 (22 Nov 2020 08:31) (111/74 - 146/87)  BP(mean): --  RR: 18 (22 Nov 2020 08:31) (18 - 20)  SpO2: 92% (22 Nov 2020 08:31) (90% - 99%)        CONSTITUTIONAL: NAD, well-developed, well-groomed  HEENT: NC/AT, PERRL, no JVD  RESPIRATORY: CTA bilaterally, normal effort  CARDIOVASCULAR: RRR, S1/S2+, no m/g/r  ABDOMEN: Nontender to palpation, normoactive bowel sounds, no rebound/guarding; No hepatosplenomegaly  MUSCLOSKELETAL: No edema, cyanosis or deformities.  PSYCH: A+O to person, place, and time; affect appropriate  NEUROLOGY: CN 2-12 are intact and symmetric; no gross neurological deficits.  SKIN: No rashes; no palpable lesions  VASC: Distal pulses palpable    LABS:                        13.0   10.43 )-----------( 285      ( 21 Nov 2020 09:07 )             40.8     11-22    x   |  x   |  x   ----------------------------<  x   x    |  x   |  1.00    Ca    8.4<L>      21 Nov 2020 09:10    TPro  6.5<L>  /  Alb  3.3  /  TBili  0.6  /  DBili  0.2  /  AST  62<H>  /  ALT  99<H>  /  AlkPhos  56  11-22              CAPILLARY BLOOD GLUCOSE            RADIOLOGY & ADDITIONAL TESTS:  Results Reviewed:   Imaging Personally Reviewed:  Electrocardiogram Personally Reviewed:

## 2020-11-23 ENCOUNTER — TRANSCRIPTION ENCOUNTER (OUTPATIENT)
Age: 75
End: 2020-11-23

## 2020-11-23 LAB
ALBUMIN SERPL ELPH-MCNC: 3.1 G/DL — LOW (ref 3.3–5.2)
ALBUMIN SERPL ELPH-MCNC: 3.2 G/DL — LOW (ref 3.3–5.2)
ALP SERPL-CCNC: 52 U/L — SIGNIFICANT CHANGE UP (ref 40–120)
ALP SERPL-CCNC: 54 U/L — SIGNIFICANT CHANGE UP (ref 40–120)
ALT FLD-CCNC: 71 U/L — HIGH
ALT FLD-CCNC: 72 U/L — HIGH
ANION GAP SERPL CALC-SCNC: 13 MMOL/L — SIGNIFICANT CHANGE UP (ref 5–17)
AST SERPL-CCNC: 38 U/L — SIGNIFICANT CHANGE UP
AST SERPL-CCNC: 39 U/L — SIGNIFICANT CHANGE UP
BILIRUB DIRECT SERPL-MCNC: 0.2 MG/DL — SIGNIFICANT CHANGE UP (ref 0–0.3)
BILIRUB INDIRECT FLD-MCNC: 0.5 MG/DL — SIGNIFICANT CHANGE UP (ref 0.2–1)
BILIRUB SERPL-MCNC: 0.6 MG/DL — SIGNIFICANT CHANGE UP (ref 0.4–2)
BILIRUB SERPL-MCNC: 0.7 MG/DL — SIGNIFICANT CHANGE UP (ref 0.4–2)
BUN SERPL-MCNC: 29 MG/DL — HIGH (ref 8–20)
CALCIUM SERPL-MCNC: 8.5 MG/DL — LOW (ref 8.6–10.2)
CHLORIDE SERPL-SCNC: 99 MMOL/L — SIGNIFICANT CHANGE UP (ref 98–107)
CO2 SERPL-SCNC: 25 MMOL/L — SIGNIFICANT CHANGE UP (ref 22–29)
CREAT SERPL-MCNC: 0.93 MG/DL — SIGNIFICANT CHANGE UP (ref 0.5–1.3)
GLUCOSE SERPL-MCNC: 89 MG/DL — SIGNIFICANT CHANGE UP (ref 70–99)
HCT VFR BLD CALC: 39.3 % — SIGNIFICANT CHANGE UP (ref 39–50)
HGB BLD-MCNC: 12.7 G/DL — LOW (ref 13–17)
MCHC RBC-ENTMCNC: 29.4 PG — SIGNIFICANT CHANGE UP (ref 27–34)
MCHC RBC-ENTMCNC: 32.3 GM/DL — SIGNIFICANT CHANGE UP (ref 32–36)
MCV RBC AUTO: 91 FL — SIGNIFICANT CHANGE UP (ref 80–100)
PLATELET # BLD AUTO: 377 K/UL — SIGNIFICANT CHANGE UP (ref 150–400)
POTASSIUM SERPL-MCNC: 4 MMOL/L — SIGNIFICANT CHANGE UP (ref 3.5–5.3)
POTASSIUM SERPL-SCNC: 4 MMOL/L — SIGNIFICANT CHANGE UP (ref 3.5–5.3)
PROT SERPL-MCNC: 6 G/DL — LOW (ref 6.6–8.7)
PROT SERPL-MCNC: 6.1 G/DL — LOW (ref 6.6–8.7)
RBC # BLD: 4.32 M/UL — SIGNIFICANT CHANGE UP (ref 4.2–5.8)
RBC # FLD: 16.1 % — HIGH (ref 10.3–14.5)
SODIUM SERPL-SCNC: 137 MMOL/L — SIGNIFICANT CHANGE UP (ref 135–145)
WBC # BLD: 11.13 K/UL — HIGH (ref 3.8–10.5)
WBC # FLD AUTO: 11.13 K/UL — HIGH (ref 3.8–10.5)

## 2020-11-23 PROCEDURE — 99233 SBSQ HOSP IP/OBS HIGH 50: CPT | Mod: CS

## 2020-11-23 PROCEDURE — 99232 SBSQ HOSP IP/OBS MODERATE 35: CPT | Mod: CS

## 2020-11-23 RX ORDER — MIDAZOLAM HYDROCHLORIDE 1 MG/ML
2 INJECTION, SOLUTION INTRAMUSCULAR; INTRAVENOUS ONCE
Refills: 0 | Status: DISCONTINUED | OUTPATIENT
Start: 2020-11-23 | End: 2020-11-23

## 2020-11-23 RX ORDER — PANTOPRAZOLE SODIUM 20 MG/1
40 TABLET, DELAYED RELEASE ORAL
Refills: 0 | Status: DISCONTINUED | OUTPATIENT
Start: 2020-11-23 | End: 2020-12-04

## 2020-11-23 RX ORDER — SENNA PLUS 8.6 MG/1
2 TABLET ORAL AT BEDTIME
Refills: 0 | Status: DISCONTINUED | OUTPATIENT
Start: 2020-11-23 | End: 2020-12-04

## 2020-11-23 RX ORDER — DEXAMETHASONE 0.5 MG/5ML
6 ELIXIR ORAL DAILY
Refills: 0 | Status: COMPLETED | OUTPATIENT
Start: 2020-11-24 | End: 2020-11-27

## 2020-11-23 RX ORDER — POLYETHYLENE GLYCOL 3350 17 G/17G
17 POWDER, FOR SOLUTION ORAL
Refills: 0 | Status: DISCONTINUED | OUTPATIENT
Start: 2020-11-23 | End: 2020-12-04

## 2020-11-23 RX ADMIN — Medication 1 DROP(S): at 18:37

## 2020-11-23 RX ADMIN — Medication 6 MILLIGRAM(S): at 05:32

## 2020-11-23 RX ADMIN — Medication 500 MILLIGRAM(S): at 13:06

## 2020-11-23 RX ADMIN — POLYETHYLENE GLYCOL 3350 17 GRAM(S): 17 POWDER, FOR SOLUTION ORAL at 21:39

## 2020-11-23 RX ADMIN — SENNA PLUS 2 TABLET(S): 8.6 TABLET ORAL at 21:39

## 2020-11-23 RX ADMIN — ENOXAPARIN SODIUM 40 MILLIGRAM(S): 100 INJECTION SUBCUTANEOUS at 05:32

## 2020-11-23 RX ADMIN — Medication 1 DROP(S): at 05:39

## 2020-11-23 RX ADMIN — Medication 81 MILLIGRAM(S): at 13:06

## 2020-11-23 RX ADMIN — Medication 300 MILLIGRAM(S): at 13:06

## 2020-11-23 RX ADMIN — REMDESIVIR 500 MILLIGRAM(S): 5 INJECTION INTRAVENOUS at 13:06

## 2020-11-23 RX ADMIN — ENOXAPARIN SODIUM 40 MILLIGRAM(S): 100 INJECTION SUBCUTANEOUS at 17:49

## 2020-11-23 NOTE — DISCHARGE NOTE NURSING/CASE MANAGEMENT/SOCIAL WORK - NSDCFUADDAPPT_GEN_ALL_CORE_FT
Please follow up with your primary care physician: Dr Ashish Cai in [place] [phone].  Please Quarantine at home for 14 days post discharge home.

## 2020-11-23 NOTE — PROGRESS NOTE ADULT - SUBJECTIVE AND OBJECTIVE BOX
[facility]  INFECTIOUS DISEASES AND INTERNAL MEDICINE at [place]  =======================================================  Pablo Villagran MD  Diplomates American Board of Internal Medicine and Infectious Diseases  Tel: [phone]      Fax: [phone]  =======================================================      [id number]  DONTA CRABTREE is a 75y  Male   ID follow up- covid 19+    tapered to NC 5l  seen in AM          Social Hx: non smoker    FAMILY HISTORY:  FH: myocardial infarction (Father)  1989 at age of 78    Family history of essential hypertension (Father)  father        Allergies    No Known Allergies    Intolerances              REVIEW OF SYSTEMS:  CONSTITUTIONAL:  No Fever or chills  HEENT:  No diplopia or blurred vision.  No earache, sore throat or runny nose.  CARDIOVASCULAR:  No pressure, squeezing, strangling, tightness, heaviness or aching about the chest, neck, axilla or epigastrium.  RESPIRATORY:  + cough, shortness of breath  GASTROINTESTINAL:  No nausea, vomiting or diarrhea.  GENITOURINARY:  No dysuria, frequency or urgency. No Blood in urine  MUSCULOSKELETAL:  no joint aches, no muscle pain  SKIN:  No change in skin, hair or nails.  NEUROLOGIC:  No Headaches, seizures or weakness.  PSYCHIATRIC:  No disorder of thought or mood.  ENDOCRINE:  No heat or cold intolerance  HEMATOLOGICAL:  No easy bruising or bleeding.           I&O's Detail           Physical Exam:  Vital Signs Last 24 Hrs      GEN: NAD, pleasant onasal cannula,   HEENT: normocephalic and atraumatic. EOMI. PERRL.  Anicteric  NECK: Supple.   LUNGS: Crackles to auscultation.  HEART: Regular rate and rhythm without murmur.  ABDOMEN: Soft, nontender, and nondistended.  Positive bowel sounds.    : No CVA tenderness  EXTREMITIES: Without any edema.  MSK: No joint swelling  NEUROLOGIC: Cranial nerves II through XII are grossly intact. No Focal Deficits  PSYCHIATRIC: Appropriate affect .  SKIN: No Rash        Labs:             Vitals:  ============  T(F): 97.8 (21 Nov 2020 05:09), Max: 98.8 (21 Nov 2020 00:35)  HR: 78 (21 Nov 2020 08:15)  BP: 150/88 (21 Nov 2020 05:09)  RR: 20 (21 Nov 2020 08:00)  SpO2: 94% (21 Nov 2020 08:15) (91% - 96%)  temp max in last 48H T(F): , Max: 98.8 (11-21-20 @ 00:35)    =======================================================  Current Antibiotics:  remdesivir  IVPB   IV Intermittent   remdesivir  IVPB 100 milliGRAM(s) IV Intermittent every 24 hours    Other medications:  allopurinol 300 milliGRAM(s) Oral daily  artificial tears (preservative free) Ophthalmic Solution 1 Drop(s) Both EYES two times a day  ascorbic acid 500 milliGRAM(s) Oral daily  aspirin  chewable 81 milliGRAM(s) Oral daily  dexAMETHasone  Injectable 6 milliGRAM(s) IV Push daily  enoxaparin Injectable 40 milliGRAM(s) SubCutaneous every 12 hours  influenza   Vaccine 0.5 milliLiter(s) IntraMuscular once  senna 8.6 milliGRAM(s) Oral Tablet - Peds 1 Tablet(s) Oral at bedtime      =======================================================  Labs:                        13.0   10.43 )-----------( 285      ( 21 Nov 2020 09:07 )             40.8      11-21    136  |  100  |  28.0<H>  ----------------------------<  98  4.1   |  24.0  |  1.06    Ca    8.4<L>      21 Nov 2020 09:10    TPro  6.5<L>  /  Alb  3.4  /  TBili  0.6  /  DBili  x   /  AST  110<H>  /  ALT  115<H>  /  AlkPhos  56  11-21      Creatinine, Serum: 1.06 mg/dL (11-21-20 @ 09:10)  Creatinine, Serum: 1.08 mg/dL (11-20-20 @ 08:00)  Creatinine, Serum: 1.11 mg/dL (11-18-20 @ 14:00)    Procalcitonin, Serum: 0.17 ng/mL (11-18-20 @ 14:00)    D-Dimer Assay, Quantitative: 183 ng/mL DDU (11-18-20 @ 14:00)    Ferritin, Serum: 155 ng/mL (11-18-20 @ 14:00)    C-Reactive Protein, Serum: 13.12 mg/dL (11-18-20 @ 14:00)    WBC Count: 10.43 K/uL (11-21-20 @ 09:07)  WBC Count: 11.31 K/uL (11-20-20 @ 08:00)  WBC Count: 5.44 K/uL (11-18-20 @ 14:00)      COVID-19 IgG Antibody Interpretation: Negative (11-19-20 @ 16:36)  COVID-19 IgG Antibody Index: 0.37 Index (11-19-20 @ 16:36)  COVID-19 PCR: Detected (11-18-20 @ 13:33)      Alkaline Phosphatase, Serum: 56 U/L (11-21-20 @ 09:10)  Alkaline Phosphatase, Serum: 55 U/L (11-21-20 @ 09:09)  Alkaline Phosphatase, Serum: 48 U/L (11-20-20 @ 08:00)  Alkaline Phosphatase, Serum: 50 U/L (11-20-20 @ 08:00)  Alkaline Phosphatase, Serum: 44 U/L (11-18-20 @ 14:00)  Alanine Aminotransferase (ALT/SGPT): 115 U/L (11-21-20 @ 09:10)  Alanine Aminotransferase (ALT/SGPT): 117 U/L (11-21-20 @ 09:09)  Alanine Aminotransferase (ALT/SGPT): 55 U/L (11-20-20 @ 08:00)  Alanine Aminotransferase (ALT/SGPT): 55 U/L (11-20-20 @ 08:00)  Alanine Aminotransferase (ALT/SGPT): 29 U/L (11-18-20 @ 14:00)  Aspartate Aminotransferase (AST/SGOT): 110 U/L (11-21-20 @ 09:10)  Aspartate Aminotransferase (AST/SGOT): 111 U/L (11-21-20 @ 09:09)  Aspartate Aminotransferase (AST/SGOT): 72 U/L (11-20-20 @ 08:00)  Aspartate Aminotransferase (AST/SGOT): 71 U/L (11-20-20 @ 08:00)  Aspartate Aminotransferase (AST/SGOT): 48 U/L (11-18-20 @ 14:00)  Bilirubin Total, Serum: 0.6 mg/dL (11-21-20 @ 09:10)  Bilirubin Total, Serum: 0.6 mg/dL (11-21-20 @ 09:09)  Bilirubin Total, Serum: 0.4 mg/dL (11-20-20 @ 08:00)  Bilirubin Total, Serum: 0.4 mg/dL (11-20-20 @ 08:00)  Bilirubin Total, Serum: 0.4 mg/dL (11-18-20 @ 14:00)  Bilirubin Direct, Serum: 0.2 mg/dL (11-21-20 @ 09:09)  Bilirubin Direct, Serum: 0.1 mg/dL (11-20-20 @ 08:00)    Radiology:  < from: Xray Chest 1 View- PORTABLE-Urgent (11.18.20 @ 14:38) >  INTERPRETATION:  AP chest on November 18, 2020 at 2:12 PM. Patient is short of breath with cough and fever.    Heart is likely enlarged.    Present film shows a lingular infiltrate new since May 26, 2018.    IMPRESSION: Heart enlargement again noted. Lingular infiltrate at this time.      < end of copied text >

## 2020-11-23 NOTE — PROGRESS NOTE ADULT - SUBJECTIVE AND OBJECTIVE BOX
[facility] Division of Hospital Medicine  Jarvis Aponte [phone]    Chief Complaint:  Patient is a 75y old  Male who presents with a chief complaint of SOB (21 Nov 2020 14:07)      SUBJECTIVE / OVERNIGHT EVENTS:  Patient seen and examined at bedside. No acute events reported overnight. Patient transitioned to 5L NC from HFNC. Saturating mid 90s, doing well. Reports feeling improved.    Patient denies chest pain, abd pain, N/V, fever, chills, dysuria or any other complaints. All remainder ROS negative.     MEDICATIONS  (STANDING):  allopurinol 300 milliGRAM(s) Oral daily  artificial tears (preservative free) Ophthalmic Solution 1 Drop(s) Both EYES two times a day  ascorbic acid 500 milliGRAM(s) Oral daily  aspirin  chewable 81 milliGRAM(s) Oral daily  dexAMETHasone  Injectable 6 milliGRAM(s) IV Push daily  enoxaparin Injectable 40 milliGRAM(s) SubCutaneous every 12 hours  influenza   Vaccine 0.5 milliLiter(s) IntraMuscular once  remdesivir  IVPB   IV Intermittent   remdesivir  IVPB 100 milliGRAM(s) IV Intermittent every 24 hours  senna 8.6 milliGRAM(s) Oral Tablet - Peds 1 Tablet(s) Oral at bedtime    MEDICATIONS  (PRN):  acetaminophen   Tablet .. 650 milliGRAM(s) Oral every 4 hours PRN Temp greater or equal to 38.5C (101.3F)  ALBUTerol    90 MICROgram(s) HFA Inhaler 2 Puff(s) Inhalation every 4 hours PRN Shortness of Breath and/or Wheezing  sodium chloride 0.65% Nasal 1 Spray(s) Both Nostrils four times a day PRN Nasal Congestion        I&O's Summary      PHYSICAL EXAM:  Vital Signs Last 24 Hrs  T(C): 37.5 (22 Nov 2020 08:31), Max: 37.5 (22 Nov 2020 08:31)  T(F): 99.5 (22 Nov 2020 08:31), Max: 99.5 (22 Nov 2020 08:31)  HR: 92 (22 Nov 2020 08:31) (74 - 92)  BP: 124/84 (22 Nov 2020 08:31) (111/74 - 146/87)  BP(mean): --  RR: 18 (22 Nov 2020 08:31) (18 - 20)  SpO2: 92% (22 Nov 2020 08:31) (90% - 99%)        CONSTITUTIONAL: NAD, well-developed, well-groomed  HEENT: NC/AT, PERRL, no JVD  RESPIRATORY: CTA bilaterally, normal effort  CARDIOVASCULAR: RRR, S1/S2+, no m/g/r  ABDOMEN: Nontender to palpation, normoactive bowel sounds, no rebound/guarding; No hepatosplenomegaly  MUSCLOSKELETAL: No edema, cyanosis or deformities.  PSYCH: A+O to person, place, and time; affect appropriate  NEUROLOGY: CN 2-12 are intact and symmetric; no gross neurological deficits.  SKIN: No rashes; no palpable lesions  VASC: Distal pulses palpable    LABS:                        13.0   10.43 )-----------( 285      ( 21 Nov 2020 09:07 )             40.8     11-22    x   |  x   |  x   ----------------------------<  x   x    |  x   |  1.00    Ca    8.4<L>      21 Nov 2020 09:10    TPro  6.5<L>  /  Alb  3.3  /  TBili  0.6  /  DBili  0.2  /  AST  62<H>  /  ALT  99<H>  /  AlkPhos  56  11-22              CAPILLARY BLOOD GLUCOSE            RADIOLOGY & ADDITIONAL TESTS:  Results Reviewed:   Imaging Personally Reviewed:  Electrocardiogram Personally Reviewed:

## 2020-11-23 NOTE — DISCHARGE NOTE NURSING/CASE MANAGEMENT/SOCIAL WORK - PATIENT PORTAL LINK FT
You can access the FollowMyHealth Patient Portal offered by [facility] by registering at the following website: [email or web address]. By joining [facility]’s FollowMyHealth portal, you will also be able to view your health information using other applications (apps) compatible with our system.

## 2020-11-23 NOTE — PROGRESS NOTE ADULT - ASSESSMENT
74 y/o M w/ a PMH of CAD, gout, obesity comes in c/o worsening SOB w/ associated chills.  Pt was found to be COVID + in the community and COVID PCR in hospital also +.  ID consulted, pt on decadron and started on Remdesivire.  Pt has had increased requirement in O2 since admission.  Now on high flow ox sat is stable over 90     #Acute hypoxic respiratory failure 2/2 COVID PNA  - Inflammatory markers improved, monitor q48h  - Encourage proning, incentive spirometry, and OOB/ambulation  - C/w Remdesivir, last dose today, monitor LFTs  - C/w Decadron 6mg daily, will order for 10 day course.  - ID following  - c/w Vitamin C, Thiamine and Zinc  - MDI q4h and Tylenol for fever   - Supplemental oxygen prn, wean as tolerated. Currently on NC 5L saturating well.    #Hx of CAD  - c/w aspirin and atorvastatin     #Hx of gout  - c/w Allopurinol 300 mg po daily     DVT ppx:  - Enoxaparin 40 mg sc q12h given COVID status    Dispo: Possible d/c home in 2-3 days.

## 2020-11-23 NOTE — PROGRESS NOTE ADULT - ASSESSMENT
74 yo  obese male from home, lives with wife, with PMHs of CAD s/p multiple stents, BEV, HLD, diverticulitis, psoriasis and gout presented with SOB. Per patient, he was tested positive for covid last Tuesday.  Was seen at urgent care and prescribed 7 days of azithromycin.. For the past 2-3 days noted to have  chills and fatigue.  Today developed SOB, hence came to ED. On admission to ED SpO2 85% on room air, COVId19 +.    COVID 19 + infection  Viral pneumonia  Acute hypoxic respiratory failure    - patient is on nasal canula, currently feeling  better  - d dimer wnl  - CRP 13. PCT 0.17  - AST/ALT elevated-will c/w monitoring for now, may need to hold remdesivir; DUE To end on 11/23; will watch  - c/w remdesivir for now  - decadron 6 mg daily while on remdesivir  - Avoid antibiotics unless there is a concern for a bacterial infection  - VTE prophylaxis per current [facility] COVID 19 protocol  - Check CBC with diff, CMP with LFT's daily, Inflammatory markers ( CRP, Ferritin, D dimer)  q48h.    - Encourage self proning q2h and ambulation, incentive spirometry as tolerated  - Taper off O2 as tolerated- once tolerating room air would ideally monitor for 24h prior to discharge.  - Inpatient Contact/Airborne isolation         Discussed with team  Will follow

## 2020-11-24 LAB
ALBUMIN SERPL ELPH-MCNC: 3.1 G/DL — LOW (ref 3.3–5.2)
ALBUMIN SERPL ELPH-MCNC: 3.1 G/DL — LOW (ref 3.3–5.2)
ALP SERPL-CCNC: 54 U/L — SIGNIFICANT CHANGE UP (ref 40–120)
ALP SERPL-CCNC: 55 U/L — SIGNIFICANT CHANGE UP (ref 40–120)
ALT FLD-CCNC: 61 U/L — HIGH
ALT FLD-CCNC: 63 U/L — HIGH
ANION GAP SERPL CALC-SCNC: 12 MMOL/L — SIGNIFICANT CHANGE UP (ref 5–17)
AST SERPL-CCNC: 39 U/L — SIGNIFICANT CHANGE UP
AST SERPL-CCNC: 40 U/L — HIGH
BASE EXCESS BLDA CALC-SCNC: 3.9 MMOL/L — HIGH (ref -3–3)
BILIRUB DIRECT SERPL-MCNC: 0.2 MG/DL — SIGNIFICANT CHANGE UP (ref 0–0.3)
BILIRUB INDIRECT FLD-MCNC: 0.4 MG/DL — SIGNIFICANT CHANGE UP (ref 0.2–1)
BILIRUB SERPL-MCNC: 0.6 MG/DL — SIGNIFICANT CHANGE UP (ref 0.4–2)
BILIRUB SERPL-MCNC: 0.6 MG/DL — SIGNIFICANT CHANGE UP (ref 0.4–2)
BLOOD GAS COMMENTS ARTERIAL: SIGNIFICANT CHANGE UP
BUN SERPL-MCNC: 31 MG/DL — HIGH (ref 8–20)
CALCIUM SERPL-MCNC: 8.8 MG/DL — SIGNIFICANT CHANGE UP (ref 8.6–10.2)
CHLORIDE SERPL-SCNC: 99 MMOL/L — SIGNIFICANT CHANGE UP (ref 98–107)
CO2 SERPL-SCNC: 27 MMOL/L — SIGNIFICANT CHANGE UP (ref 22–29)
CREAT SERPL-MCNC: 1.22 MG/DL — SIGNIFICANT CHANGE UP (ref 0.5–1.3)
CRP SERPL-MCNC: 5.31 MG/DL — HIGH (ref 0–0.4)
D DIMER BLD IA.RAPID-MCNC: 202 NG/ML DDU — SIGNIFICANT CHANGE UP
FERRITIN SERPL-MCNC: 180 NG/ML — SIGNIFICANT CHANGE UP (ref 30–400)
GAS PNL BLDA: SIGNIFICANT CHANGE UP
GLUCOSE SERPL-MCNC: 110 MG/DL — HIGH (ref 70–99)
HCO3 BLDA-SCNC: 28 MMOL/L — HIGH (ref 20–26)
HCT VFR BLD CALC: 40.3 % — SIGNIFICANT CHANGE UP (ref 39–50)
HGB BLD-MCNC: 13 G/DL — SIGNIFICANT CHANGE UP (ref 13–17)
HOROWITZ INDEX BLDA+IHG-RTO: SIGNIFICANT CHANGE UP
LDH SERPL L TO P-CCNC: 382 U/L — HIGH (ref 98–192)
MCHC RBC-ENTMCNC: 29.6 PG — SIGNIFICANT CHANGE UP (ref 27–34)
MCHC RBC-ENTMCNC: 32.3 GM/DL — SIGNIFICANT CHANGE UP (ref 32–36)
MCV RBC AUTO: 91.8 FL — SIGNIFICANT CHANGE UP (ref 80–100)
PCO2 BLDA: 37 MMHG — SIGNIFICANT CHANGE UP (ref 35–45)
PH BLDA: 7.48 — HIGH (ref 7.35–7.45)
PLATELET # BLD AUTO: 398 K/UL — SIGNIFICANT CHANGE UP (ref 150–400)
PO2 BLDA: 82 MMHG — LOW (ref 83–108)
POTASSIUM SERPL-MCNC: 4.9 MMOL/L — SIGNIFICANT CHANGE UP (ref 3.5–5.3)
POTASSIUM SERPL-SCNC: 4.9 MMOL/L — SIGNIFICANT CHANGE UP (ref 3.5–5.3)
PROT SERPL-MCNC: 6.1 G/DL — LOW (ref 6.6–8.7)
PROT SERPL-MCNC: 6.1 G/DL — LOW (ref 6.6–8.7)
RBC # BLD: 4.39 M/UL — SIGNIFICANT CHANGE UP (ref 4.2–5.8)
RBC # FLD: 16 % — HIGH (ref 10.3–14.5)
SAO2 % BLDA: 96 % — SIGNIFICANT CHANGE UP (ref 95–99)
SODIUM SERPL-SCNC: 138 MMOL/L — SIGNIFICANT CHANGE UP (ref 135–145)
WBC # BLD: 12.28 K/UL — HIGH (ref 3.8–10.5)
WBC # FLD AUTO: 12.28 K/UL — HIGH (ref 3.8–10.5)

## 2020-11-24 PROCEDURE — 99232 SBSQ HOSP IP/OBS MODERATE 35: CPT | Mod: CS

## 2020-11-24 PROCEDURE — 99233 SBSQ HOSP IP/OBS HIGH 50: CPT | Mod: CS

## 2020-11-24 RX ORDER — REMDESIVIR 5 MG/ML
100 INJECTION INTRAVENOUS EVERY 24 HOURS
Refills: 0 | Status: COMPLETED | OUTPATIENT
Start: 2020-11-24 | End: 2020-11-28

## 2020-11-24 RX ADMIN — Medication 1 DROP(S): at 05:32

## 2020-11-24 RX ADMIN — ENOXAPARIN SODIUM 40 MILLIGRAM(S): 100 INJECTION SUBCUTANEOUS at 05:32

## 2020-11-24 RX ADMIN — ENOXAPARIN SODIUM 40 MILLIGRAM(S): 100 INJECTION SUBCUTANEOUS at 16:45

## 2020-11-24 RX ADMIN — Medication 300 MILLIGRAM(S): at 12:37

## 2020-11-24 RX ADMIN — Medication 1 DROP(S): at 16:46

## 2020-11-24 RX ADMIN — SENNA PLUS 2 TABLET(S): 8.6 TABLET ORAL at 22:06

## 2020-11-24 RX ADMIN — PANTOPRAZOLE SODIUM 40 MILLIGRAM(S): 20 TABLET, DELAYED RELEASE ORAL at 05:32

## 2020-11-24 RX ADMIN — POLYETHYLENE GLYCOL 3350 17 GRAM(S): 17 POWDER, FOR SOLUTION ORAL at 05:32

## 2020-11-24 RX ADMIN — Medication 81 MILLIGRAM(S): at 12:37

## 2020-11-24 RX ADMIN — POLYETHYLENE GLYCOL 3350 17 GRAM(S): 17 POWDER, FOR SOLUTION ORAL at 16:45

## 2020-11-24 RX ADMIN — REMDESIVIR 500 MILLIGRAM(S): 5 INJECTION INTRAVENOUS at 16:44

## 2020-11-24 RX ADMIN — Medication 6 MILLIGRAM(S): at 05:32

## 2020-11-24 RX ADMIN — Medication 500 MILLIGRAM(S): at 12:37

## 2020-11-24 NOTE — PROGRESS NOTE ADULT - ASSESSMENT
76 y/o M w/ a PMH of CAD, gout, obesity comes in c/o worsening SOB w/ associated chills.  Pt was found to be COVID + in the community and COVID PCR in hospital also +.  ID consulted, pt on decadron and started on Remdesivire.  Pt has had increased requirement in O2 since admission.  Now on high flow ox sat is stable over 90     #Acute hypoxic respiratory failure 2/2 COVID PNA  - Inflammatory markers improved, monitor q48h  - Encourage proning, incentive spirometry, and OOB/ambulation  - C/w Remdesivir, last dose today, monitor LFTs  - C/w Decadron 6mg daily, will order for 10 day course.  - ID following  - c/w Vitamin C, Thiamine and Zinc  - MDI q4h and Tylenol for fever   - Supplemental oxygen prn, wean as tolerated. Back on HFNC, wean as tolerated.    #Hx of CAD  - c/w aspirin and atorvastatin     #Hx of gout  - c/w Allopurinol 300 mg po daily     DVT ppx:  - Enoxaparin 40 mg sc q12h given COVID status    Dispo: Pending clinical course, still requiring high amounts of HFNC.

## 2020-11-24 NOTE — PROGRESS NOTE ADULT - SUBJECTIVE AND OBJECTIVE BOX
[facility] Division of Hospital Medicine  Jarvis Aponte [phone]    Chief Complaint:  Patient is a 75y old  Male who presents with a chief complaint of SOB (21 Nov 2020 14:07)      SUBJECTIVE / OVERNIGHT EVENTS:  Patient seen and examined at bedside. Overnight pt began desaturating. Pt placed by on HFNC. Feels the same today, no new complaints.    Patient denies chest pain, abd pain, N/V, fever, chills, dysuria or any other complaints. All remainder ROS negative.     MEDICATIONS  (STANDING):  allopurinol 300 milliGRAM(s) Oral daily  artificial tears (preservative free) Ophthalmic Solution 1 Drop(s) Both EYES two times a day  ascorbic acid 500 milliGRAM(s) Oral daily  aspirin  chewable 81 milliGRAM(s) Oral daily  dexAMETHasone  Injectable 6 milliGRAM(s) IV Push daily  enoxaparin Injectable 40 milliGRAM(s) SubCutaneous every 12 hours  influenza   Vaccine 0.5 milliLiter(s) IntraMuscular once  remdesivir  IVPB   IV Intermittent   remdesivir  IVPB 100 milliGRAM(s) IV Intermittent every 24 hours  senna 8.6 milliGRAM(s) Oral Tablet - Peds 1 Tablet(s) Oral at bedtime    MEDICATIONS  (PRN):  acetaminophen   Tablet .. 650 milliGRAM(s) Oral every 4 hours PRN Temp greater or equal to 38.5C (101.3F)  ALBUTerol    90 MICROgram(s) HFA Inhaler 2 Puff(s) Inhalation every 4 hours PRN Shortness of Breath and/or Wheezing  sodium chloride 0.65% Nasal 1 Spray(s) Both Nostrils four times a day PRN Nasal Congestion        I&O's Summary      PHYSICAL EXAM:  Vital Signs Last 24 Hrs  T(C): 37.5 (22 Nov 2020 08:31), Max: 37.5 (22 Nov 2020 08:31)  T(F): 99.5 (22 Nov 2020 08:31), Max: 99.5 (22 Nov 2020 08:31)  HR: 92 (22 Nov 2020 08:31) (74 - 92)  BP: 124/84 (22 Nov 2020 08:31) (111/74 - 146/87)  BP(mean): --  RR: 18 (22 Nov 2020 08:31) (18 - 20)  SpO2: 92% (22 Nov 2020 08:31) (90% - 99%)        CONSTITUTIONAL: NAD, well-developed, well-groomed  HEENT: NC/AT, PERRL, no JVD  RESPIRATORY: CTA bilaterally, normal effort  CARDIOVASCULAR: RRR, S1/S2+, no m/g/r  ABDOMEN: Nontender to palpation, normoactive bowel sounds, no rebound/guarding; No hepatosplenomegaly  MUSCLOSKELETAL: No edema, cyanosis or deformities.  PSYCH: A+O to person, place, and time; affect appropriate  NEUROLOGY: CN 2-12 are intact and symmetric; no gross neurological deficits.  SKIN: No rashes; no palpable lesions  VASC: Distal pulses palpable    LABS:                        13.0   10.43 )-----------( 285      ( 21 Nov 2020 09:07 )             40.8     11-22    x   |  x   |  x   ----------------------------<  x   x    |  x   |  1.00    Ca    8.4<L>      21 Nov 2020 09:10    TPro  6.5<L>  /  Alb  3.3  /  TBili  0.6  /  DBili  0.2  /  AST  62<H>  /  ALT  99<H>  /  AlkPhos  56  11-22              CAPILLARY BLOOD GLUCOSE            RADIOLOGY & ADDITIONAL TESTS:  Results Reviewed:   Imaging Personally Reviewed:  Electrocardiogram Personally Reviewed:

## 2020-11-24 NOTE — PROGRESS NOTE ADULT - SUBJECTIVE AND OBJECTIVE BOX
[facility]  INFECTIOUS DISEASES AND INTERNAL MEDICINE at [place]  =======================================================  Pablo Villagran MD  Diplomates American Board of Internal Medicine and Infectious Diseases  Tel: [phone]      Fax: [phone]  =======================================================      [id number]  DONTA CRABTREE is a 75y  Male   ID follow up- covid 19+    now back on hi gilbert, feels more dyspneic today          Social Hx: non smoker    FAMILY HISTORY:  FH: myocardial infarction (Father)  1989 at age of 77    Family history of essential hypertension (Father)  father        Allergies    No Known Allergies    Intolerances              REVIEW OF SYSTEMS:  CONSTITUTIONAL:  No Fever or chills  HEENT:  No diplopia or blurred vision.  No earache, sore throat or runny nose.  CARDIOVASCULAR:  No pressure, squeezing, strangling, tightness, heaviness or aching about the chest, neck, axilla or epigastrium.  RESPIRATORY:  + cough, shortness of breath  GASTROINTESTINAL:  No nausea, vomiting or diarrhea.  GENITOURINARY:  No dysuria, frequency or urgency. No Blood in urine  MUSCULOSKELETAL:  no joint aches, no muscle pain  SKIN:  No change in skin, hair or nails.  NEUROLOGIC:  No Headaches, seizures or weakness.  PSYCHIATRIC:  No disorder of thought or mood.  ENDOCRINE:  No heat or cold intolerance  HEMATOLOGICAL:  No easy bruising or bleeding.           I&O's Detail           Physical Exam:  Vital Signs Last 24 Hrs      GEN: NAD, pleasant on nasal cannula,   HEENT: normocephalic and atraumatic. EOMI. PERRL.  Anicteric  NECK: Supple.   LUNGS: Crackles to auscultation.  HEART: Regular rate and rhythm without murmur.  ABDOMEN: Soft, nontender, and nondistended.  Positive bowel sounds.    : No CVA tenderness  EXTREMITIES: Without any edema.  MSK: No joint swelling  NEUROLOGIC: Cranial nerves II through XII are grossly intact. No Focal Deficits  PSYCHIATRIC: Appropriate affect .  SKIN: No Rash        Labs:             Vitals:  ============  T(F): 97.8 (21 Nov 2020 05:09), Max: 98.8 (21 Nov 2020 00:35)  HR: 78 (21 Nov 2020 08:15)  BP: 150/88 (21 Nov 2020 05:09)  RR: 20 (21 Nov 2020 08:00)  SpO2: 94% (21 Nov 2020 08:15) (91% - 96%)  temp max in last 48H T(F): , Max: 98.8 (11-21-20 @ 00:35)    =======================================================  Current Antibiotics:  remdesivir  IVPB   IV Intermittent   remdesivir  IVPB 100 milliGRAM(s) IV Intermittent every 24 hours    Other medications:  allopurinol 300 milliGRAM(s) Oral daily  artificial tears (preservative free) Ophthalmic Solution 1 Drop(s) Both EYES two times a day  ascorbic acid 500 milliGRAM(s) Oral daily  aspirin  chewable 81 milliGRAM(s) Oral daily  dexAMETHasone  Injectable 6 milliGRAM(s) IV Push daily  enoxaparin Injectable 40 milliGRAM(s) SubCutaneous every 12 hours  influenza   Vaccine 0.5 milliLiter(s) IntraMuscular once  senna 8.6 milliGRAM(s) Oral Tablet - Peds 1 Tablet(s) Oral at bedtime      =======================================================  Labs:                                          13.0   12.28 )-----------( 398      ( 24 Nov 2020 07:42 )             40.3   11-24    138  |  99  |  31.0<H>  ----------------------------<  110<H>  4.9   |  27.0  |  1.22    Ca    8.8      24 Nov 2020 07:42    TPro  6.1<L>  /  Alb  3.1<L>  /  TBili  0.6  /  DBili  x   /  AST  39  /  ALT  63<H>  /  AlkPhos  55  11-24      Creatinine, Serum: 1.06 mg/dL (11-21-20 @ 09:10)  Creatinine, Serum: 1.08 mg/dL (11-20-20 @ 08:00)  Creatinine, Serum: 1.11 mg/dL (11-18-20 @ 14:00)    Procalcitonin, Serum: 0.17 ng/mL (11-18-20 @ 14:00)    D-Dimer Assay, Quantitative: 183 ng/mL DDU (11-18-20 @ 14:00)    Ferritin, Serum: 155 ng/mL (11-18-20 @ 14:00)    C-Reactive Protein, Serum: 13.12 mg/dL (11-18-20 @ 14:00)    WBC Count: 10.43 K/uL (11-21-20 @ 09:07)  WBC Count: 11.31 K/uL (11-20-20 @ 08:00)  WBC Count: 5.44 K/uL (11-18-20 @ 14:00)      COVID-19 IgG Antibody Interpretation: Negative (11-19-20 @ 16:36)  COVID-19 IgG Antibody Index: 0.37 Index (11-19-20 @ 16:36)  COVID-19 PCR: Detected (11-18-20 @ 13:33)      Alkaline Phosphatase, Serum: 56 U/L (11-21-20 @ 09:10)  Alkaline Phosphatase, Serum: 55 U/L (11-21-20 @ 09:09)  Alkaline Phosphatase, Serum: 48 U/L (11-20-20 @ 08:00)  Alkaline Phosphatase, Serum: 50 U/L (11-20-20 @ 08:00)  Alkaline Phosphatase, Serum: 44 U/L (11-18-20 @ 14:00)  Alanine Aminotransferase (ALT/SGPT): 115 U/L (11-21-20 @ 09:10)  Alanine Aminotransferase (ALT/SGPT): 117 U/L (11-21-20 @ 09:09)  Alanine Aminotransferase (ALT/SGPT): 55 U/L (11-20-20 @ 08:00)  Alanine Aminotransferase (ALT/SGPT): 55 U/L (11-20-20 @ 08:00)  Alanine Aminotransferase (ALT/SGPT): 29 U/L (11-18-20 @ 14:00)  Aspartate Aminotransferase (AST/SGOT): 110 U/L (11-21-20 @ 09:10)  Aspartate Aminotransferase (AST/SGOT): 111 U/L (11-21-20 @ 09:09)  Aspartate Aminotransferase (AST/SGOT): 72 U/L (11-20-20 @ 08:00)  Aspartate Aminotransferase (AST/SGOT): 71 U/L (11-20-20 @ 08:00)  Aspartate Aminotransferase (AST/SGOT): 48 U/L (11-18-20 @ 14:00)  Bilirubin Total, Serum: 0.6 mg/dL (11-21-20 @ 09:10)  Bilirubin Total, Serum: 0.6 mg/dL (11-21-20 @ 09:09)  Bilirubin Total, Serum: 0.4 mg/dL (11-20-20 @ 08:00)  Bilirubin Total, Serum: 0.4 mg/dL (11-20-20 @ 08:00)  Bilirubin Total, Serum: 0.4 mg/dL (11-18-20 @ 14:00)  Bilirubin Direct, Serum: 0.2 mg/dL (11-21-20 @ 09:09)  Bilirubin Direct, Serum: 0.1 mg/dL (11-20-20 @ 08:00)    Radiology:  < from: Xray Chest 1 View- PORTABLE-Urgent (11.18.20 @ 14:38) >  INTERPRETATION:  AP chest on November 18, 2020 at 2:12 PM. Patient is short of breath with cough and fever.    Heart is likely enlarged.    Present film shows a lingular infiltrate new since May 26, 2018.    IMPRESSION: Heart enlargement again noted. Lingular infiltrate at this time.      < end of copied text >   [facility]  INFECTIOUS DISEASES AND INTERNAL MEDICINE at [place]  =======================================================  Pablo Villagran MD  Diplomates American Board of Internal Medicine and Infectious Diseases  Tel: [phone]      Fax: [phone]  =======================================================      [id number]  DONTA CRABTREE is a 75y  Male   ID follow up- covid 19+    now back on hi gilbert, feels more dyspneic today          Social Hx: non smoker    FAMILY HISTORY:  FH: myocardial infarction (Father)  1989 at age of 78    Family history of essential hypertension (Father)  father        Allergies    No Known Allergies    Intolerances              REVIEW OF SYSTEMS:  CONSTITUTIONAL:  No Fever or chills  HEENT:  No diplopia or blurred vision.  No earache, sore throat or runny nose.  CARDIOVASCULAR:  No pressure, squeezing, strangling, tightness, heaviness or aching about the chest, neck, axilla or epigastrium.  RESPIRATORY:  + cough, shortness of breath  GASTROINTESTINAL:  No nausea, vomiting or diarrhea.  GENITOURINARY:  No dysuria, frequency or urgency. No Blood in urine  MUSCULOSKELETAL:  no joint aches, no muscle pain  SKIN:  No change in skin, hair or nails.  NEUROLOGIC:  No Headaches, seizures or weakness.  PSYCHIATRIC:  No disorder of thought or mood.  ENDOCRINE:  No heat or cold intolerance  HEMATOLOGICAL:  No easy bruising or bleeding.           I&O's Detail           Physical Exam:  Vital Signs Last 24 Hrs      GEN: NAD, pleasant on hi gilbert  HEENT: normocephalic and atraumatic. EOMI. PERRL.  Anicteric  NECK: Supple.   LUNGS: Crackles to auscultation.  HEART: Regular rate and rhythm without murmur.  ABDOMEN: Soft, nontender, and nondistended.  Positive bowel sounds.    : No CVA tenderness  EXTREMITIES: Without any edema.  MSK: No joint swelling  NEUROLOGIC: Cranial nerves II through XII are grossly intact. No Focal Deficits  PSYCHIATRIC: Appropriate affect .  SKIN: No Rash        Labs:             Vitals:  ============  T(F): 97.8 (21 Nov 2020 05:09), Max: 98.8 (21 Nov 2020 00:35)  HR: 78 (21 Nov 2020 08:15)  BP: 150/88 (21 Nov 2020 05:09)  RR: 20 (21 Nov 2020 08:00)  SpO2: 94% (21 Nov 2020 08:15) (91% - 96%)  temp max in last 48H T(F): , Max: 98.8 (11-21-20 @ 00:35)    =======================================================  Current Antibiotics:  remdesivir  IVPB   IV Intermittent   remdesivir  IVPB 100 milliGRAM(s) IV Intermittent every 24 hours    Other medications:  allopurinol 300 milliGRAM(s) Oral daily  artificial tears (preservative free) Ophthalmic Solution 1 Drop(s) Both EYES two times a day  ascorbic acid 500 milliGRAM(s) Oral daily  aspirin  chewable 81 milliGRAM(s) Oral daily  dexAMETHasone  Injectable 6 milliGRAM(s) IV Push daily  enoxaparin Injectable 40 milliGRAM(s) SubCutaneous every 12 hours  influenza   Vaccine 0.5 milliLiter(s) IntraMuscular once  senna 8.6 milliGRAM(s) Oral Tablet - Peds 1 Tablet(s) Oral at bedtime      =======================================================  Labs:                                          13.0   12.28 )-----------( 398      ( 24 Nov 2020 07:42 )             40.3   11-24    138  |  99  |  31.0<H>  ----------------------------<  110<H>  4.9   |  27.0  |  1.22    Ca    8.8      24 Nov 2020 07:42    TPro  6.1<L>  /  Alb  3.1<L>  /  TBili  0.6  /  DBili  x   /  AST  39  /  ALT  63<H>  /  AlkPhos  55  11-24      Creatinine, Serum: 1.06 mg/dL (11-21-20 @ 09:10)  Creatinine, Serum: 1.08 mg/dL (11-20-20 @ 08:00)  Creatinine, Serum: 1.11 mg/dL (11-18-20 @ 14:00)    Procalcitonin, Serum: 0.17 ng/mL (11-18-20 @ 14:00)    D-Dimer Assay, Quantitative: 183 ng/mL DDU (11-18-20 @ 14:00)    Ferritin, Serum: 155 ng/mL (11-18-20 @ 14:00)    C-Reactive Protein, Serum: 13.12 mg/dL (11-18-20 @ 14:00)    WBC Count: 10.43 K/uL (11-21-20 @ 09:07)  WBC Count: 11.31 K/uL (11-20-20 @ 08:00)  WBC Count: 5.44 K/uL (11-18-20 @ 14:00)      COVID-19 IgG Antibody Interpretation: Negative (11-19-20 @ 16:36)  COVID-19 IgG Antibody Index: 0.37 Index (11-19-20 @ 16:36)  COVID-19 PCR: Detected (11-18-20 @ 13:33)      Alkaline Phosphatase, Serum: 56 U/L (11-21-20 @ 09:10)  Alkaline Phosphatase, Serum: 55 U/L (11-21-20 @ 09:09)  Alkaline Phosphatase, Serum: 48 U/L (11-20-20 @ 08:00)  Alkaline Phosphatase, Serum: 50 U/L (11-20-20 @ 08:00)  Alkaline Phosphatase, Serum: 44 U/L (11-18-20 @ 14:00)  Alanine Aminotransferase (ALT/SGPT): 115 U/L (11-21-20 @ 09:10)  Alanine Aminotransferase (ALT/SGPT): 117 U/L (11-21-20 @ 09:09)  Alanine Aminotransferase (ALT/SGPT): 55 U/L (11-20-20 @ 08:00)  Alanine Aminotransferase (ALT/SGPT): 55 U/L (11-20-20 @ 08:00)  Alanine Aminotransferase (ALT/SGPT): 29 U/L (11-18-20 @ 14:00)  Aspartate Aminotransferase (AST/SGOT): 110 U/L (11-21-20 @ 09:10)  Aspartate Aminotransferase (AST/SGOT): 111 U/L (11-21-20 @ 09:09)  Aspartate Aminotransferase (AST/SGOT): 72 U/L (11-20-20 @ 08:00)  Aspartate Aminotransferase (AST/SGOT): 71 U/L (11-20-20 @ 08:00)  Aspartate Aminotransferase (AST/SGOT): 48 U/L (11-18-20 @ 14:00)  Bilirubin Total, Serum: 0.6 mg/dL (11-21-20 @ 09:10)  Bilirubin Total, Serum: 0.6 mg/dL (11-21-20 @ 09:09)  Bilirubin Total, Serum: 0.4 mg/dL (11-20-20 @ 08:00)  Bilirubin Total, Serum: 0.4 mg/dL (11-20-20 @ 08:00)  Bilirubin Total, Serum: 0.4 mg/dL (11-18-20 @ 14:00)  Bilirubin Direct, Serum: 0.2 mg/dL (11-21-20 @ 09:09)  Bilirubin Direct, Serum: 0.1 mg/dL (11-20-20 @ 08:00)    Radiology:  < from: Xray Chest 1 View- PORTABLE-Urgent (11.18.20 @ 14:38) >  INTERPRETATION:  AP chest on November 18, 2020 at 2:12 PM. Patient is short of breath with cough and fever.    Heart is likely enlarged.    Present film shows a lingular infiltrate new since May 26, 2018.    IMPRESSION: Heart enlargement again noted. Lingular infiltrate at this time.      < end of copied text >

## 2020-11-24 NOTE — PROGRESS NOTE ADULT - ASSESSMENT
76 yo  obese male from home, lives with wife, with PMHs of CAD s/p multiple stents, BEV, HLD, diverticulitis, psoriasis and gout presented with SOB. Per patient, he was tested positive for covid last Tuesday.  Was seen at urgent care and prescribed 7 days of azithromycin.. For the past 2-3 days noted to have  chills and fatigue.  Today developed SOB, hence came to ED. On admission to ED SpO2 85% on room air, COVId19 +.    COVID 19 + infection  Viral pneumonia  Acute hypoxic respiratory failure    - patient is back on hi gilbert  - d dimer wnl  - CRP 13. PCT 0.17  - c/w remdesivir for now  - decadron 6 mg daily while on remdesivir  - Avoid antibiotics unless there is a concern for a bacterial infection  - VTE prophylaxis per current [facility] COVID 19 protocol  - Check CBC with diff, CMP with LFT's daily, Inflammatory markers ( CRP, Ferritin, D dimer)  q48h.    - Encourage self proning q2h and ambulation, incentive spirometry as tolerated  - Taper off O2 as tolerated- once tolerating room air would ideally monitor for 24h prior to discharge.  - Inpatient Contact/Airborne isolation         Discussed with pharmacy  Will follow

## 2020-11-25 LAB
ALBUMIN SERPL ELPH-MCNC: 2.8 G/DL — LOW (ref 3.3–5.2)
ALBUMIN SERPL ELPH-MCNC: 2.9 G/DL — LOW (ref 3.3–5.2)
ALP SERPL-CCNC: 52 U/L — SIGNIFICANT CHANGE UP (ref 40–120)
ALP SERPL-CCNC: 54 U/L — SIGNIFICANT CHANGE UP (ref 40–120)
ALT FLD-CCNC: 48 U/L — HIGH
ALT FLD-CCNC: 49 U/L — HIGH
ANION GAP SERPL CALC-SCNC: 11 MMOL/L — SIGNIFICANT CHANGE UP (ref 5–17)
AST SERPL-CCNC: 28 U/L — SIGNIFICANT CHANGE UP
AST SERPL-CCNC: 29 U/L — SIGNIFICANT CHANGE UP
BILIRUB DIRECT SERPL-MCNC: 0.3 MG/DL — SIGNIFICANT CHANGE UP (ref 0–0.3)
BILIRUB INDIRECT FLD-MCNC: 0.4 MG/DL — SIGNIFICANT CHANGE UP (ref 0.2–1)
BILIRUB SERPL-MCNC: 0.6 MG/DL — SIGNIFICANT CHANGE UP (ref 0.4–2)
BILIRUB SERPL-MCNC: 0.7 MG/DL — SIGNIFICANT CHANGE UP (ref 0.4–2)
BUN SERPL-MCNC: 27 MG/DL — HIGH (ref 8–20)
CALCIUM SERPL-MCNC: 8 MG/DL — LOW (ref 8.6–10.2)
CHLORIDE SERPL-SCNC: 98 MMOL/L — SIGNIFICANT CHANGE UP (ref 98–107)
CO2 SERPL-SCNC: 26 MMOL/L — SIGNIFICANT CHANGE UP (ref 22–29)
CREAT SERPL-MCNC: 1.08 MG/DL — SIGNIFICANT CHANGE UP (ref 0.5–1.3)
CRP SERPL-MCNC: 6.89 MG/DL — HIGH (ref 0–0.4)
D DIMER BLD IA.RAPID-MCNC: 190 NG/ML DDU — SIGNIFICANT CHANGE UP
FERRITIN SERPL-MCNC: 167 NG/ML — SIGNIFICANT CHANGE UP (ref 30–400)
GLUCOSE SERPL-MCNC: 85 MG/DL — SIGNIFICANT CHANGE UP (ref 70–99)
HCT VFR BLD CALC: 39.9 % — SIGNIFICANT CHANGE UP (ref 39–50)
HGB BLD-MCNC: 12.8 G/DL — LOW (ref 13–17)
MCHC RBC-ENTMCNC: 29.5 PG — SIGNIFICANT CHANGE UP (ref 27–34)
MCHC RBC-ENTMCNC: 32.1 GM/DL — SIGNIFICANT CHANGE UP (ref 32–36)
MCV RBC AUTO: 91.9 FL — SIGNIFICANT CHANGE UP (ref 80–100)
PLATELET # BLD AUTO: 382 K/UL — SIGNIFICANT CHANGE UP (ref 150–400)
POTASSIUM SERPL-MCNC: 4.1 MMOL/L — SIGNIFICANT CHANGE UP (ref 3.5–5.3)
POTASSIUM SERPL-SCNC: 4.1 MMOL/L — SIGNIFICANT CHANGE UP (ref 3.5–5.3)
PROT SERPL-MCNC: 5.8 G/DL — LOW (ref 6.6–8.7)
PROT SERPL-MCNC: 5.9 G/DL — LOW (ref 6.6–8.7)
RBC # BLD: 4.34 M/UL — SIGNIFICANT CHANGE UP (ref 4.2–5.8)
RBC # FLD: 16.3 % — HIGH (ref 10.3–14.5)
SODIUM SERPL-SCNC: 135 MMOL/L — SIGNIFICANT CHANGE UP (ref 135–145)
WBC # BLD: 10.83 K/UL — HIGH (ref 3.8–10.5)
WBC # FLD AUTO: 10.83 K/UL — HIGH (ref 3.8–10.5)

## 2020-11-25 PROCEDURE — 99232 SBSQ HOSP IP/OBS MODERATE 35: CPT | Mod: CS

## 2020-11-25 PROCEDURE — 99233 SBSQ HOSP IP/OBS HIGH 50: CPT | Mod: CS

## 2020-11-25 RX ADMIN — POLYETHYLENE GLYCOL 3350 17 GRAM(S): 17 POWDER, FOR SOLUTION ORAL at 05:21

## 2020-11-25 RX ADMIN — Medication 1 DROP(S): at 05:22

## 2020-11-25 RX ADMIN — Medication 1 DROP(S): at 17:05

## 2020-11-25 RX ADMIN — PANTOPRAZOLE SODIUM 40 MILLIGRAM(S): 20 TABLET, DELAYED RELEASE ORAL at 05:21

## 2020-11-25 RX ADMIN — ALBUTEROL 2 PUFF(S): 90 AEROSOL, METERED ORAL at 10:00

## 2020-11-25 RX ADMIN — Medication 81 MILLIGRAM(S): at 11:27

## 2020-11-25 RX ADMIN — Medication 300 MILLIGRAM(S): at 11:27

## 2020-11-25 RX ADMIN — Medication 6 MILLIGRAM(S): at 05:21

## 2020-11-25 RX ADMIN — Medication 500 MILLIGRAM(S): at 11:27

## 2020-11-25 RX ADMIN — ENOXAPARIN SODIUM 40 MILLIGRAM(S): 100 INJECTION SUBCUTANEOUS at 17:04

## 2020-11-25 RX ADMIN — ENOXAPARIN SODIUM 40 MILLIGRAM(S): 100 INJECTION SUBCUTANEOUS at 05:21

## 2020-11-25 RX ADMIN — Medication 1 SPRAY(S): at 10:00

## 2020-11-25 RX ADMIN — REMDESIVIR 500 MILLIGRAM(S): 5 INJECTION INTRAVENOUS at 15:45

## 2020-11-25 NOTE — PROGRESS NOTE ADULT - ASSESSMENT
74 y/o M w/ a PMH of CAD, gout, obesity comes in c/o worsening SOB w/ associated chills.  Pt was found to be COVID + in the community and COVID PCR in hospital also +.  ID consulted, pt on decadron and started on Remdesivire.  Pt has had increased requirement in O2 since admission.  Now on high flow ox sat is stable over 90     #Acute hypoxic respiratory failure 2/2 COVID PNA  - Inflammatory markers improved, monitor q48h  - Encourage proning, incentive spirometry, and OOB/ambulation  - C/w Remdesivir,  monitor LFTs  - C/w Decadron 6mg daily, will order for 10 day course.  - ID following  - c/w Vitamin C, Thiamine and Zinc  - MDI q4h and Tylenol for fever   - Supplemental oxygen prn, wean as tolerated. Back on HFNC 109% FiO2, wean as tolerated.    #Hx of CAD  - c/w aspirin and atorvastatin     #Hx of gout  - c/w Allopurinol 300 mg po daily     #B/l LE edema  - Likely secondary to venous stasis  - Encourage ambulation and SCDs    DVT ppx:  - Enoxaparin 40 mg sc q12h given COVID status    Pt's wife updated about clinical course    Dispo: Pending clinical course, still requiring high amounts of HFNC.

## 2020-11-25 NOTE — PROGRESS NOTE ADULT - SUBJECTIVE AND OBJECTIVE BOX
[facility]  INFECTIOUS DISEASES AND INTERNAL MEDICINE at [place]  =======================================================  Pablo Villagran MD  Diplomates American Board of Internal Medicine and Infectious Diseases  Tel: [phone]      Fax: [phone]  =======================================================      [id number]  DONTA CRABTREE is a 75y  Male   ID follow up- covid 19+    patient states his breathing is improving however he remains on hi gilbert          Social Hx: non smoker    FAMILY HISTORY:  FH: myocardial infarction (Father)  1989 at age of 78    Family history of essential hypertension (Father)  father        Allergies    No Known Allergies    Intolerances              REVIEW OF SYSTEMS:  CONSTITUTIONAL:  No Fever or chills  HEENT:  No diplopia or blurred vision.  No earache, sore throat or runny nose.  CARDIOVASCULAR:  No pressure, squeezing, strangling, tightness, heaviness or aching about the chest, neck, axilla or epigastrium.  RESPIRATORY:  + cough, shortness of breath  GASTROINTESTINAL:  No nausea, vomiting or diarrhea.  GENITOURINARY:  No dysuria, frequency or urgency. No Blood in urine  MUSCULOSKELETAL:  no joint aches, no muscle pain  SKIN:  No change in skin, hair or nails.  NEUROLOGIC:  No Headaches, seizures or weakness.  PSYCHIATRIC:  No disorder of thought or mood.  ENDOCRINE:  No heat or cold intolerance  HEMATOLOGICAL:  No easy bruising or bleeding.           I&O's Detail           Physical Exam:  Vital Signs Last 24 Hrs      GEN: NAD, pleasant on hi gilbert  HEENT: normocephalic and atraumatic. EOMI. PERRL.  Anicteric  NECK: Supple.   LUNGS: Crackles to auscultation.  HEART: Regular rate and rhythm without murmur.  ABDOMEN: Soft, nontender, and nondistended.  Positive bowel sounds.    : No CVA tenderness  EXTREMITIES: Without any edema.  MSK: No joint swelling  NEUROLOGIC: Cranial nerves II through XII are grossly intact. No Focal Deficits  PSYCHIATRIC: Appropriate affect .  SKIN: No Rash            =======================================================  Current Antibiotics:  remdesivir  IVPB   IV Intermittent   remdesivir  IVPB 100 milliGRAM(s) IV Intermittent every 24 hours    Other medications:  allopurinol 300 milliGRAM(s) Oral daily  artificial tears (preservative free) Ophthalmic Solution 1 Drop(s) Both EYES two times a day  ascorbic acid 500 milliGRAM(s) Oral daily  aspirin  chewable 81 milliGRAM(s) Oral daily  dexAMETHasone  Injectable 6 milliGRAM(s) IV Push daily  enoxaparin Injectable 40 milliGRAM(s) SubCutaneous every 12 hours  influenza   Vaccine 0.5 milliLiter(s) IntraMuscular once  senna 8.6 milliGRAM(s) Oral Tablet - Peds 1 Tablet(s) Oral at bedtime      =======================================================  Labs:                                                     12.8   10.83 )-----------( 382      ( 25 Nov 2020 07:16 )             39.9   11-25    135  |  98  |  27.0<H>  ----------------------------<  85  4.1   |  26.0  |  1.08    Ca    8.0<L>      25 Nov 2020 07:16    TPro  5.8<L>  /  Alb  2.8<L>  /  TBili  0.7  /  DBili  0.3  /  AST  28  /  ALT  48<H>  /  AlkPhos  52  11-25        Radiology:  < from: Xray Chest 1 View- PORTABLE-Urgent (11.18.20 @ 14:38) >  INTERPRETATION:  AP chest on November 18, 2020 at 2:12 PM. Patient is short of breath with cough and fever.    Heart is likely enlarged.    Present film shows a lingular infiltrate new since May 26, 2018.    IMPRESSION: Heart enlargement again noted. Lingular infiltrate at this time.      < end of copied text >

## 2020-11-25 NOTE — PROGRESS NOTE ADULT - ASSESSMENT
76 yo  obese male from home, lives with wife, with PMHs of CAD s/p multiple stents, BEV, HLD, diverticulitis, psoriasis and gout presented with SOB. Per patient, he was tested positive for covid last Tuesday.  Was seen at urgent care and prescribed 7 days of azithromycin.. For the past 2-3 days noted to have  chills and fatigue.  Today developed SOB, hence came to ED. On admission to ED SpO2 85% on room air, COVId19 +.    COVID 19 + infection  Viral pneumonia  Acute hypoxic respiratory failure    - patient is remains on hi gilbert  - d dimer wnl- repeat d dimer if elevated would check CTA chest  - CRP 13. PCT 0.17  - c/w remdesivir for now  - decadron 6 mg daily while on remdesivir  - Avoid antibiotics unless there is a concern for a bacterial infection  - VTE prophylaxis per current [facility] COVID 19 protocol  - Check CBC with diff, CMP with LFT's daily, Inflammatory markers ( CRP, Ferritin, D dimer)  q48h.    - Encourage self proning q2h and ambulation, incentive spirometry as tolerated  - Taper off O2 as tolerated- once tolerating room air would ideally monitor for 24h prior to discharge.  - Inpatient Contact/Airborne isolation         Discussed with pharmacy, RN  Will follow

## 2020-11-25 NOTE — PROGRESS NOTE ADULT - SUBJECTIVE AND OBJECTIVE BOX
[facility] Division of Hospital Medicine  Jarvis Aponte [phone]    Chief Complaint:  Patient is a 75y old  Male who presents with a chief complaint of SOB (21 Nov 2020 14:07)      SUBJECTIVE / OVERNIGHT EVENTS:  Patient seen and examined at bedside. No acute events reported overnight. Patient still requiring HFNC 100%. States he feels the same, sitting comfortably in bed.     Patient denies chest pain, abd pain, N/V, fever, chills, dysuria or any other complaints. All remainder ROS negative.     MEDICATIONS  (STANDING):  allopurinol 300 milliGRAM(s) Oral daily  artificial tears (preservative free) Ophthalmic Solution 1 Drop(s) Both EYES two times a day  ascorbic acid 500 milliGRAM(s) Oral daily  aspirin  chewable 81 milliGRAM(s) Oral daily  dexAMETHasone  Injectable 6 milliGRAM(s) IV Push daily  enoxaparin Injectable 40 milliGRAM(s) SubCutaneous every 12 hours  influenza   Vaccine 0.5 milliLiter(s) IntraMuscular once  remdesivir  IVPB   IV Intermittent   remdesivir  IVPB 100 milliGRAM(s) IV Intermittent every 24 hours  senna 8.6 milliGRAM(s) Oral Tablet - Peds 1 Tablet(s) Oral at bedtime    MEDICATIONS  (PRN):  acetaminophen   Tablet .. 650 milliGRAM(s) Oral every 4 hours PRN Temp greater or equal to 38.5C (101.3F)  ALBUTerol    90 MICROgram(s) HFA Inhaler 2 Puff(s) Inhalation every 4 hours PRN Shortness of Breath and/or Wheezing  sodium chloride 0.65% Nasal 1 Spray(s) Both Nostrils four times a day PRN Nasal Congestion        I&O's Summary      PHYSICAL EXAM:  Vital Signs Last 24 Hrs  T(C): 37.5 (22 Nov 2020 08:31), Max: 37.5 (22 Nov 2020 08:31)  T(F): 99.5 (22 Nov 2020 08:31), Max: 99.5 (22 Nov 2020 08:31)  HR: 92 (22 Nov 2020 08:31) (74 - 92)  BP: 124/84 (22 Nov 2020 08:31) (111/74 - 146/87)  BP(mean): --  RR: 18 (22 Nov 2020 08:31) (18 - 20)  SpO2: 92% (22 Nov 2020 08:31) (90% - 99%)        CONSTITUTIONAL: NAD, well-developed, well-groomed  HEENT: NC/AT, PERRL, no JVD  RESPIRATORY: CTA bilaterally, normal effort  CARDIOVASCULAR: RRR, S1/S2+, no m/g/r  ABDOMEN: Nontender to palpation, normoactive bowel sounds, no rebound/guarding; No hepatosplenomegaly  MUSCLOSKELETAL: Bilateral LE edema.  PSYCH: A+O to person, place, and time; affect appropriate  NEUROLOGY: CN 2-12 are intact and symmetric; no gross neurological deficits.  SKIN: No rashes; no palpable lesions  VASC: Distal pulses palpable    LABS:                        13.0   10.43 )-----------( 285      ( 21 Nov 2020 09:07 )             40.8     11-22    x   |  x   |  x   ----------------------------<  x   x    |  x   |  1.00    Ca    8.4<L>      21 Nov 2020 09:10    TPro  6.5<L>  /  Alb  3.3  /  TBili  0.6  /  DBili  0.2  /  AST  62<H>  /  ALT  99<H>  /  AlkPhos  56  11-22              CAPILLARY BLOOD GLUCOSE            RADIOLOGY & ADDITIONAL TESTS:  Results Reviewed:   Imaging Personally Reviewed:  Electrocardiogram Personally Reviewed:

## 2020-11-26 LAB
ALBUMIN SERPL ELPH-MCNC: 2.7 G/DL — LOW (ref 3.3–5.2)
ALBUMIN SERPL ELPH-MCNC: 2.8 G/DL — LOW (ref 3.3–5.2)
ALP SERPL-CCNC: 52 U/L — SIGNIFICANT CHANGE UP (ref 40–120)
ALP SERPL-CCNC: 53 U/L — SIGNIFICANT CHANGE UP (ref 40–120)
ALT FLD-CCNC: 41 U/L — HIGH
ALT FLD-CCNC: 41 U/L — HIGH
ANION GAP SERPL CALC-SCNC: 10 MMOL/L — SIGNIFICANT CHANGE UP (ref 5–17)
AST SERPL-CCNC: 27 U/L — SIGNIFICANT CHANGE UP
AST SERPL-CCNC: 30 U/L — SIGNIFICANT CHANGE UP
BILIRUB DIRECT SERPL-MCNC: 0.3 MG/DL — SIGNIFICANT CHANGE UP (ref 0–0.3)
BILIRUB INDIRECT FLD-MCNC: 0.4 MG/DL — SIGNIFICANT CHANGE UP (ref 0.2–1)
BILIRUB SERPL-MCNC: 0.7 MG/DL — SIGNIFICANT CHANGE UP (ref 0.4–2)
BILIRUB SERPL-MCNC: 0.7 MG/DL — SIGNIFICANT CHANGE UP (ref 0.4–2)
BUN SERPL-MCNC: 26 MG/DL — HIGH (ref 8–20)
CALCIUM SERPL-MCNC: 8.1 MG/DL — LOW (ref 8.6–10.2)
CHLORIDE SERPL-SCNC: 100 MMOL/L — SIGNIFICANT CHANGE UP (ref 98–107)
CO2 SERPL-SCNC: 24 MMOL/L — SIGNIFICANT CHANGE UP (ref 22–29)
CREAT SERPL-MCNC: 1.12 MG/DL — SIGNIFICANT CHANGE UP (ref 0.5–1.3)
CRP SERPL-MCNC: 7.89 MG/DL — HIGH (ref 0–0.4)
D DIMER BLD IA.RAPID-MCNC: 177 NG/ML DDU — SIGNIFICANT CHANGE UP
GLUCOSE SERPL-MCNC: 139 MG/DL — HIGH (ref 70–99)
HCT VFR BLD CALC: 39.5 % — SIGNIFICANT CHANGE UP (ref 39–50)
HGB BLD-MCNC: 12.7 G/DL — LOW (ref 13–17)
LDH SERPL L TO P-CCNC: 347 U/L — HIGH (ref 98–192)
MCHC RBC-ENTMCNC: 29.5 PG — SIGNIFICANT CHANGE UP (ref 27–34)
MCHC RBC-ENTMCNC: 32.2 GM/DL — SIGNIFICANT CHANGE UP (ref 32–36)
MCV RBC AUTO: 91.9 FL — SIGNIFICANT CHANGE UP (ref 80–100)
PLATELET # BLD AUTO: 352 K/UL — SIGNIFICANT CHANGE UP (ref 150–400)
POTASSIUM SERPL-MCNC: 4.6 MMOL/L — SIGNIFICANT CHANGE UP (ref 3.5–5.3)
POTASSIUM SERPL-SCNC: 4.6 MMOL/L — SIGNIFICANT CHANGE UP (ref 3.5–5.3)
PROCALCITONIN SERPL-MCNC: 0.1 NG/ML — SIGNIFICANT CHANGE UP (ref 0.02–0.1)
PROT SERPL-MCNC: 5.9 G/DL — LOW (ref 6.6–8.7)
PROT SERPL-MCNC: 6.1 G/DL — LOW (ref 6.6–8.7)
RBC # BLD: 4.3 M/UL — SIGNIFICANT CHANGE UP (ref 4.2–5.8)
RBC # FLD: 16.4 % — HIGH (ref 10.3–14.5)
SODIUM SERPL-SCNC: 134 MMOL/L — LOW (ref 135–145)
WBC # BLD: 10.54 K/UL — HIGH (ref 3.8–10.5)
WBC # FLD AUTO: 10.54 K/UL — HIGH (ref 3.8–10.5)

## 2020-11-26 PROCEDURE — 99232 SBSQ HOSP IP/OBS MODERATE 35: CPT | Mod: CS

## 2020-11-26 PROCEDURE — 99233 SBSQ HOSP IP/OBS HIGH 50: CPT | Mod: CS

## 2020-11-26 RX ADMIN — Medication 300 MILLIGRAM(S): at 07:35

## 2020-11-26 RX ADMIN — Medication 500 MILLIGRAM(S): at 07:36

## 2020-11-26 RX ADMIN — PANTOPRAZOLE SODIUM 40 MILLIGRAM(S): 20 TABLET, DELAYED RELEASE ORAL at 05:40

## 2020-11-26 RX ADMIN — Medication 1 DROP(S): at 12:23

## 2020-11-26 RX ADMIN — Medication 1 DROP(S): at 05:42

## 2020-11-26 RX ADMIN — Medication 6 MILLIGRAM(S): at 05:40

## 2020-11-26 RX ADMIN — ENOXAPARIN SODIUM 40 MILLIGRAM(S): 100 INJECTION SUBCUTANEOUS at 15:39

## 2020-11-26 RX ADMIN — SENNA PLUS 2 TABLET(S): 8.6 TABLET ORAL at 21:25

## 2020-11-26 RX ADMIN — Medication 81 MILLIGRAM(S): at 07:36

## 2020-11-26 RX ADMIN — REMDESIVIR 500 MILLIGRAM(S): 5 INJECTION INTRAVENOUS at 15:23

## 2020-11-26 RX ADMIN — ENOXAPARIN SODIUM 40 MILLIGRAM(S): 100 INJECTION SUBCUTANEOUS at 05:40

## 2020-11-26 NOTE — PROGRESS NOTE ADULT - SUBJECTIVE AND OBJECTIVE BOX
[facility] Division of Hospital Medicine  Jarvis Aponte [phone]    Chief Complaint:  Patient is a 75y old  Male who presents with a chief complaint of SOB (21 Nov 2020 14:07)      SUBJECTIVE / OVERNIGHT EVENTS:  Patient seen and examined at bedside. Patient attempted to be weaned off last night however still desaturates. Placed back on FiO2 at 100%, 50L. Today he states he feels better, no new complaints.    Patient denies chest pain, abd pain, N/V, fever, chills, dysuria or any other complaints. All remainder ROS negative.     MEDICATIONS  (STANDING):  allopurinol 300 milliGRAM(s) Oral daily  artificial tears (preservative free) Ophthalmic Solution 1 Drop(s) Both EYES two times a day  ascorbic acid 500 milliGRAM(s) Oral daily  aspirin  chewable 81 milliGRAM(s) Oral daily  dexAMETHasone  Injectable 6 milliGRAM(s) IV Push daily  enoxaparin Injectable 40 milliGRAM(s) SubCutaneous every 12 hours  influenza   Vaccine 0.5 milliLiter(s) IntraMuscular once  remdesivir  IVPB   IV Intermittent   remdesivir  IVPB 100 milliGRAM(s) IV Intermittent every 24 hours  senna 8.6 milliGRAM(s) Oral Tablet - Peds 1 Tablet(s) Oral at bedtime    MEDICATIONS  (PRN):  acetaminophen   Tablet .. 650 milliGRAM(s) Oral every 4 hours PRN Temp greater or equal to 38.5C (101.3F)  ALBUTerol    90 MICROgram(s) HFA Inhaler 2 Puff(s) Inhalation every 4 hours PRN Shortness of Breath and/or Wheezing  sodium chloride 0.65% Nasal 1 Spray(s) Both Nostrils four times a day PRN Nasal Congestion        I&O's Summary      PHYSICAL EXAM:  Vital Signs Last 24 Hrs  T(C): 37.5 (22 Nov 2020 08:31), Max: 37.5 (22 Nov 2020 08:31)  T(F): 99.5 (22 Nov 2020 08:31), Max: 99.5 (22 Nov 2020 08:31)  HR: 92 (22 Nov 2020 08:31) (74 - 92)  BP: 124/84 (22 Nov 2020 08:31) (111/74 - 146/87)  BP(mean): --  RR: 18 (22 Nov 2020 08:31) (18 - 20)  SpO2: 92% (22 Nov 2020 08:31) (90% - 99%)        CONSTITUTIONAL: NAD, well-developed, well-groomed  HEENT: NC/AT, PERRL, no JVD  RESPIRATORY: CTA bilaterally, normal effort  CARDIOVASCULAR: RRR, S1/S2+, no m/g/r  ABDOMEN: Nontender to palpation, normoactive bowel sounds, no rebound/guarding; No hepatosplenomegaly  MUSCLOSKELETAL: Bilateral LE edema.  PSYCH: A+O to person, place, and time; affect appropriate  NEUROLOGY: CN 2-12 are intact and symmetric; no gross neurological deficits.  SKIN: No rashes; no palpable lesions  VASC: Distal pulses palpable    LABS:                        13.0   10.43 )-----------( 285      ( 21 Nov 2020 09:07 )             40.8     11-22    x   |  x   |  x   ----------------------------<  x   x    |  x   |  1.00    Ca    8.4<L>      21 Nov 2020 09:10    TPro  6.5<L>  /  Alb  3.3  /  TBili  0.6  /  DBili  0.2  /  AST  62<H>  /  ALT  99<H>  /  AlkPhos  56  11-22              CAPILLARY BLOOD GLUCOSE            RADIOLOGY & ADDITIONAL TESTS:  Results Reviewed:   Imaging Personally Reviewed:  Electrocardiogram Personally Reviewed:

## 2020-11-26 NOTE — PROGRESS NOTE ADULT - ASSESSMENT
76 y/o M w/ a PMH of CAD, gout, obesity comes in c/o worsening SOB w/ associated chills.  Pt was found to be COVID + in the community and COVID PCR in hospital also +.  ID consulted, pt on decadron and started on Remdesivire.  Pt has had increased requirement in O2 since admission.  Now on high flow ox sat is stable over 90     #Acute hypoxic respiratory failure 2/2 COVID PNA  - Inflammatory markers improved, monitor q48h  - Encourage proning, incentive spirometry, and OOB/ambulation  - C/w Remdesivir, monitor LFTs  - C/w Decadron 6mg daily, will order for 10 day course.  - ID following  - c/w Vitamin C, Thiamine and Zinc  - MDI q4h and Tylenol for fever   - Supplemental oxygen prn, wean as tolerated. Back on HFNC 100% FiO2, wean as tolerated.  - D-dimer is negative, however will pursue CTA to r/o PE given persistent hypoxia w/ high O2 requirements.    #Hx of CAD  - c/w aspirin and atorvastatin     #Hx of gout  - c/w Allopurinol 300 mg po daily     #B/l LE edema  - Likely secondary to venous stasis  - Encourage ambulation and SCDs    DVT ppx:  - Enoxaparin 40 mg sc q12h given COVID status      Dispo: Pending clinical course, still requiring high amounts of HFNC.

## 2020-11-26 NOTE — PROGRESS NOTE ADULT - SUBJECTIVE AND OBJECTIVE BOX
[facility]  INFECTIOUS DISEASES AND INTERNAL MEDICINE at [place]  =======================================================  Pablo Villagran MD  Diplomates American Board of Internal Medicine and Infectious Diseases  Tel: [phone]      Fax: [phone]  =======================================================      [id number]  DONTA CRABTREE is a 75y  Male   ID follow up- covid 19+    remains on hi gilbert  patient being cleaned and changed  per RN hi gilbert canula is often not in nares and he remains stable          Social Hx: non smoker    FAMILY HISTORY:  FH: myocardial infarction (Father)  1989 at age of 78    Family history of essential hypertension (Father)  father        Allergies    No Known Allergies    Intolerances              REVIEW OF SYSTEMS:  CONSTITUTIONAL:  No Fever or chills  HEENT:  No diplopia or blurred vision.  No earache, sore throat or runny nose.  CARDIOVASCULAR:  No pressure, squeezing, strangling, tightness, heaviness or aching about the chest, neck, axilla or epigastrium.  RESPIRATORY:  + cough, shortness of breath  GASTROINTESTINAL:  No nausea, vomiting or diarrhea.  GENITOURINARY:  No dysuria, frequency or urgency. No Blood in urine  MUSCULOSKELETAL:  no joint aches, no muscle pain  SKIN:  No change in skin, hair or nails.  NEUROLOGIC:  No Headaches, seizures or weakness.  PSYCHIATRIC:  No disorder of thought or mood.  ENDOCRINE:  No heat or cold intolerance  HEMATOLOGICAL:  No easy bruising or bleeding.           I&O's Detail           Physical Exam:  Vital Signs Last 24 Hrs      GEN: NAD, pleasant on hi gilbert  HEENT: normocephalic and atraumatic. EOMI. PERRL.  Anicteric  NECK: Supple.   LUNGS: Crackles to auscultation.  HEART: Regular rate and rhythm without murmur.  ABDOMEN: Soft, nontender, and nondistended.  Positive bowel sounds.    : No CVA tenderness  EXTREMITIES: Without any edema.  MSK: No joint swelling  NEUROLOGIC: Cranial nerves II through XII are grossly intact. No Focal Deficits  PSYCHIATRIC: Appropriate affect .  SKIN: No Rash            =======================================================  Current Antibiotics:  remdesivir  IVPB   IV Intermittent   remdesivir  IVPB 100 milliGRAM(s) IV Intermittent every 24 hours    Other medications:  allopurinol 300 milliGRAM(s) Oral daily  artificial tears (preservative free) Ophthalmic Solution 1 Drop(s) Both EYES two times a day  ascorbic acid 500 milliGRAM(s) Oral daily  aspirin  chewable 81 milliGRAM(s) Oral daily  dexAMETHasone  Injectable 6 milliGRAM(s) IV Push daily  enoxaparin Injectable 40 milliGRAM(s) SubCutaneous every 12 hours  influenza   Vaccine 0.5 milliLiter(s) IntraMuscular once  senna 8.6 milliGRAM(s) Oral Tablet - Peds 1 Tablet(s) Oral at bedtime      =======================================================  Labs:                                         12.7   10.54 )-----------( 352      ( 26 Nov 2020 11:09 )             39.5   11-26    134<L>  |  100  |  26.0<H>  ----------------------------<  139<H>  4.6   |  24.0  |  1.12    Ca    8.1<L>      26 Nov 2020 11:09    TPro  5.9<L>  /  Alb  2.8<L>  /  TBili  0.7  /  DBili  x   /  AST  27  /  ALT  41<H>  /  AlkPhos  52  11-26        Radiology:  < from: Xray Chest 1 View- PORTABLE-Urgent (11.18.20 @ 14:38) >  INTERPRETATION:  AP chest on November 18, 2020 at 2:12 PM. Patient is short of breath with cough and fever.    Heart is likely enlarged.    Present film shows a lingular infiltrate new since May 26, 2018.    IMPRESSION: Heart enlargement again noted. Lingular infiltrate at this time.      < end of copied text >

## 2020-11-26 NOTE — PROGRESS NOTE ADULT - ASSESSMENT
76 yo  obese male from home, lives with wife, with PMHs of CAD s/p multiple stents, BEV, HLD, diverticulitis, psoriasis and gout presented with SOB. Per patient, he was tested positive for covid last Tuesday.  Was seen at urgent care and prescribed 7 days of azithromycin.. For the past 2-3 days noted to have  chills and fatigue.  Today developed SOB, hence came to ED. On admission to ED SpO2 85% on room air, COVId19 +.    COVID 19 + infection  Viral pneumonia  Acute hypoxic respiratory failure    - patient is remains on hi gilbert  - d dimer wnl- consider CTA chest  - CRP 13. PCT 0.17  - c/w remdesivir for now  - decadron 6 mg daily while on remdesivir  - Avoid antibiotics unless there is a concern for a bacterial infection  - VTE prophylaxis per current [facility] COVID 19 protocol  - Check CBC with diff, CMP with LFT's daily, Inflammatory markers ( CRP, Ferritin, D dimer)  q48h.    - Encourage self proning q2h and ambulation, incentive spirometry as tolerated  - Taper off O2 as tolerated- once tolerating room air would ideally monitor for 24h prior to discharge.  - Inpatient Contact/Airborne isolation         Discussed with Dr Aponte, RN  Will follow

## 2020-11-27 LAB
ALBUMIN SERPL ELPH-MCNC: 2.7 G/DL — LOW (ref 3.3–5.2)
ALBUMIN SERPL ELPH-MCNC: 3.1 G/DL — LOW (ref 3.3–5.2)
ALP SERPL-CCNC: 53 U/L — SIGNIFICANT CHANGE UP (ref 40–120)
ALP SERPL-CCNC: 53 U/L — SIGNIFICANT CHANGE UP (ref 40–120)
ALT FLD-CCNC: 37 U/L — SIGNIFICANT CHANGE UP
ALT FLD-CCNC: 38 U/L — SIGNIFICANT CHANGE UP
ANION GAP SERPL CALC-SCNC: 12 MMOL/L — SIGNIFICANT CHANGE UP (ref 5–17)
AST SERPL-CCNC: 27 U/L — SIGNIFICANT CHANGE UP
AST SERPL-CCNC: 27 U/L — SIGNIFICANT CHANGE UP
BASE EXCESS BLDA CALC-SCNC: 3.4 MMOL/L — HIGH (ref -3–3)
BILIRUB DIRECT SERPL-MCNC: 0.2 MG/DL — SIGNIFICANT CHANGE UP (ref 0–0.3)
BILIRUB INDIRECT FLD-MCNC: 0.4 MG/DL — SIGNIFICANT CHANGE UP (ref 0.2–1)
BILIRUB SERPL-MCNC: 0.6 MG/DL — SIGNIFICANT CHANGE UP (ref 0.4–2)
BILIRUB SERPL-MCNC: 0.7 MG/DL — SIGNIFICANT CHANGE UP (ref 0.4–2)
BLOOD GAS COMMENTS ARTERIAL: SIGNIFICANT CHANGE UP
BUN SERPL-MCNC: 25 MG/DL — HIGH (ref 8–20)
CALCIUM SERPL-MCNC: 7.9 MG/DL — LOW (ref 8.6–10.2)
CHLORIDE SERPL-SCNC: 99 MMOL/L — SIGNIFICANT CHANGE UP (ref 98–107)
CO2 SERPL-SCNC: 23 MMOL/L — SIGNIFICANT CHANGE UP (ref 22–29)
CREAT SERPL-MCNC: 0.95 MG/DL — SIGNIFICANT CHANGE UP (ref 0.5–1.3)
GAS PNL BLDA: SIGNIFICANT CHANGE UP
GLUCOSE SERPL-MCNC: 89 MG/DL — SIGNIFICANT CHANGE UP (ref 70–99)
HCO3 BLDA-SCNC: 27 MMOL/L — HIGH (ref 20–26)
HCT VFR BLD CALC: 39 % — SIGNIFICANT CHANGE UP (ref 39–50)
HGB BLD-MCNC: 12.6 G/DL — LOW (ref 13–17)
HOROWITZ INDEX BLDA+IHG-RTO: SIGNIFICANT CHANGE UP
MCHC RBC-ENTMCNC: 29.3 PG — SIGNIFICANT CHANGE UP (ref 27–34)
MCHC RBC-ENTMCNC: 32.3 GM/DL — SIGNIFICANT CHANGE UP (ref 32–36)
MCV RBC AUTO: 90.7 FL — SIGNIFICANT CHANGE UP (ref 80–100)
PCO2 BLDA: 38 MMHG — SIGNIFICANT CHANGE UP (ref 35–45)
PH BLDA: 7.47 — HIGH (ref 7.35–7.45)
PLATELET # BLD AUTO: 358 K/UL — SIGNIFICANT CHANGE UP (ref 150–400)
PO2 BLDA: 102 MMHG — SIGNIFICANT CHANGE UP (ref 83–108)
POTASSIUM SERPL-MCNC: 4.6 MMOL/L — SIGNIFICANT CHANGE UP (ref 3.5–5.3)
POTASSIUM SERPL-SCNC: 4.6 MMOL/L — SIGNIFICANT CHANGE UP (ref 3.5–5.3)
PROT SERPL-MCNC: 5.9 G/DL — LOW (ref 6.6–8.7)
PROT SERPL-MCNC: 5.9 G/DL — LOW (ref 6.6–8.7)
RBC # BLD: 4.3 M/UL — SIGNIFICANT CHANGE UP (ref 4.2–5.8)
RBC # FLD: 16.5 % — HIGH (ref 10.3–14.5)
SAO2 % BLDA: 98 % — SIGNIFICANT CHANGE UP (ref 95–99)
SODIUM SERPL-SCNC: 134 MMOL/L — LOW (ref 135–145)
WBC # BLD: 11.84 K/UL — HIGH (ref 3.8–10.5)
WBC # FLD AUTO: 11.84 K/UL — HIGH (ref 3.8–10.5)

## 2020-11-27 PROCEDURE — 99232 SBSQ HOSP IP/OBS MODERATE 35: CPT | Mod: CS

## 2020-11-27 PROCEDURE — 93970 EXTREMITY STUDY: CPT | Mod: 26

## 2020-11-27 PROCEDURE — 99233 SBSQ HOSP IP/OBS HIGH 50: CPT | Mod: CS

## 2020-11-27 RX ORDER — DEXAMETHASONE 0.5 MG/5ML
6 ELIXIR ORAL DAILY
Refills: 0 | Status: DISCONTINUED | OUTPATIENT
Start: 2020-11-27 | End: 2020-12-04

## 2020-11-27 RX ADMIN — ENOXAPARIN SODIUM 40 MILLIGRAM(S): 100 INJECTION SUBCUTANEOUS at 05:35

## 2020-11-27 RX ADMIN — Medication 6 MILLIGRAM(S): at 05:35

## 2020-11-27 RX ADMIN — ENOXAPARIN SODIUM 40 MILLIGRAM(S): 100 INJECTION SUBCUTANEOUS at 21:40

## 2020-11-27 RX ADMIN — Medication 81 MILLIGRAM(S): at 13:28

## 2020-11-27 RX ADMIN — Medication 300 MILLIGRAM(S): at 13:28

## 2020-11-27 RX ADMIN — Medication 1 DROP(S): at 05:36

## 2020-11-27 RX ADMIN — PANTOPRAZOLE SODIUM 40 MILLIGRAM(S): 20 TABLET, DELAYED RELEASE ORAL at 05:35

## 2020-11-27 RX ADMIN — SENNA PLUS 2 TABLET(S): 8.6 TABLET ORAL at 21:40

## 2020-11-27 RX ADMIN — Medication 500 MILLIGRAM(S): at 13:28

## 2020-11-27 RX ADMIN — Medication 1 DROP(S): at 23:04

## 2020-11-27 RX ADMIN — Medication 6 MILLIGRAM(S): at 13:27

## 2020-11-27 RX ADMIN — REMDESIVIR 500 MILLIGRAM(S): 5 INJECTION INTRAVENOUS at 13:28

## 2020-11-27 RX ADMIN — POLYETHYLENE GLYCOL 3350 17 GRAM(S): 17 POWDER, FOR SOLUTION ORAL at 05:35

## 2020-11-27 NOTE — PROGRESS NOTE ADULT - SUBJECTIVE AND OBJECTIVE BOX
[facility] Division of Hospital Medicine  Jarvis Aponte [phone]    Chief Complaint:  Patient is a 75y old  Male who presents with a chief complaint of SOB (21 Nov 2020 14:07)      SUBJECTIVE / OVERNIGHT EVENTS:  Patient seen and examined at bedside. Overnight, patient noted to desaturate on ambulation to 60%. Currently requiring HFNC at 100% saturating low-mid 90s. States he feels better and has no new complaints.    Patient denies chest pain, abd pain, N/V, fever, chills, dysuria or any other complaints. All remainder ROS negative.     MEDICATIONS  (STANDING):  allopurinol 300 milliGRAM(s) Oral daily  artificial tears (preservative free) Ophthalmic Solution 1 Drop(s) Both EYES two times a day  ascorbic acid 500 milliGRAM(s) Oral daily  aspirin  chewable 81 milliGRAM(s) Oral daily  dexAMETHasone  Injectable 6 milliGRAM(s) IV Push daily  enoxaparin Injectable 40 milliGRAM(s) SubCutaneous every 12 hours  influenza   Vaccine 0.5 milliLiter(s) IntraMuscular once  remdesivir  IVPB   IV Intermittent   remdesivir  IVPB 100 milliGRAM(s) IV Intermittent every 24 hours  senna 8.6 milliGRAM(s) Oral Tablet - Peds 1 Tablet(s) Oral at bedtime    MEDICATIONS  (PRN):  acetaminophen   Tablet .. 650 milliGRAM(s) Oral every 4 hours PRN Temp greater or equal to 38.5C (101.3F)  ALBUTerol    90 MICROgram(s) HFA Inhaler 2 Puff(s) Inhalation every 4 hours PRN Shortness of Breath and/or Wheezing  sodium chloride 0.65% Nasal 1 Spray(s) Both Nostrils four times a day PRN Nasal Congestion        I&O's Summary      PHYSICAL EXAM:  Vital Signs Last 24 Hrs  T(C): 37.5 (22 Nov 2020 08:31), Max: 37.5 (22 Nov 2020 08:31)  T(F): 99.5 (22 Nov 2020 08:31), Max: 99.5 (22 Nov 2020 08:31)  HR: 92 (22 Nov 2020 08:31) (74 - 92)  BP: 124/84 (22 Nov 2020 08:31) (111/74 - 146/87)  BP(mean): --  RR: 18 (22 Nov 2020 08:31) (18 - 20)  SpO2: 92% (22 Nov 2020 08:31) (90% - 99%)        CONSTITUTIONAL: NAD, well-developed, well-groomed  HEENT: NC/AT, PERRL, no JVD  RESPIRATORY: CTA bilaterally, normal effort  CARDIOVASCULAR: RRR, S1/S2+, no m/g/r  ABDOMEN: Nontender to palpation, normoactive bowel sounds, no rebound/guarding; No hepatosplenomegaly  MUSCLOSKELETAL: Bilateral LE edema.  PSYCH: A+O to person, place, and time; affect appropriate  NEUROLOGY: CN 2-12 are intact and symmetric; no gross neurological deficits.  SKIN: No rashes; no palpable lesions  VASC: Distal pulses palpable    LABS:                        13.0   10.43 )-----------( 285      ( 21 Nov 2020 09:07 )             40.8     11-22    x   |  x   |  x   ----------------------------<  x   x    |  x   |  1.00    Ca    8.4<L>      21 Nov 2020 09:10    TPro  6.5<L>  /  Alb  3.3  /  TBili  0.6  /  DBili  0.2  /  AST  62<H>  /  ALT  99<H>  /  AlkPhos  56  11-22              CAPILLARY BLOOD GLUCOSE            RADIOLOGY & ADDITIONAL TESTS:  Results Reviewed:   Imaging Personally Reviewed:  Electrocardiogram Personally Reviewed:

## 2020-11-27 NOTE — CHART NOTE - NSCHARTNOTEFT_GEN_A_CORE
Source: Patient [ ]  Family [ ]   other [x ]    Current Diet: Diet, Regular (11-18-20 @ 17:09)    PO intake:  Pt with good po intake- consuming 100% at meals per RN flowsheets.    Current Weight:   (11/18) 220 lbs    % Weight Change no new weight to assess, will continue to monitor. Aware pt with 3+ moderate edema B/L legs noted.    Pertinent Medications: MEDICATIONS  (STANDING):  allopurinol 300 milliGRAM(s) Oral daily  artificial tears (preservative free) Ophthalmic Solution 1 Drop(s) Both EYES two times a day  ascorbic acid 500 milliGRAM(s) Oral daily  aspirin  chewable 81 milliGRAM(s) Oral daily  dexAMETHasone  Injectable 6 milliGRAM(s) IV Push daily  enoxaparin Injectable 40 milliGRAM(s) SubCutaneous every 12 hours  pantoprazole    Tablet 40 milliGRAM(s) Oral before breakfast  polyethylene glycol 3350 17 Gram(s) Oral two times a day  remdesivir  IVPB 100 milliGRAM(s) IV Intermittent every 24 hours  senna 2 Tablet(s) Oral at bedtime    MEDICATIONS  (PRN):  acetaminophen   Tablet .. 650 milliGRAM(s) Oral every 4 hours PRN Temp greater or equal to 38.5C (101.3F)  ALBUTerol    90 MICROgram(s) HFA Inhaler 2 Puff(s) Inhalation every 4 hours PRN Shortness of Breath and/or Wheezing  sodium chloride 0.65% Nasal 1 Spray(s) Both Nostrils four times a day PRN Nasal Congestion    Pertinent Labs: CBC Full  -  ( 27 Nov 2020 08:45 )  WBC Count : 11.84 K/uL  RBC Count : 4.30 M/uL  Hemoglobin : 12.6 g/dL  Hematocrit : 39.0 %  Platelet Count - Automated : 358 K/uL  Mean Cell Volume : 90.7 fl  Mean Cell Hemoglobin : 29.3 pg  Mean Cell Hemoglobin Concentration : 32.3 gm/dL  11-27 Na134 mmol/L<L> Glu 89 mg/dL K+ 4.6 mmol/L Cr  0.95 mg/dL BUN 25.0 mg/dL<H> Phos n/a   Alb 2.7 g/dL<L> PAB n/a       Skin: no skin breakdown noted     Current Nutrition Diagnosis: Pt remains at nutrition risk secondary to increased nutrient needs related to increased physiologic demand for nutrient as evidenced by pt with acute hypoxic respiratory failure due to COVID PNA, requiring HFNC at this time.  Pt with good po intake, consuming 100% at meals per RN flowsheets.    Recommendations:   1. Add Ensure daily  2. Continue with Vit C 500mg daily  3. Monitor daily wts     Monitoring and Evaluation:   [x ] PO intake [x ] Tolerance to diet prescription [X] Weights  [X] Follow up per protocol [X] Labs:

## 2020-11-27 NOTE — PROGRESS NOTE ADULT - ASSESSMENT
74 yo  obese male from home, lives with wife, with PMHs of CAD s/p multiple stents, BEV, HLD, diverticulitis, psoriasis and gout presented with SOB. Per patient, he was tested positive for covid last Tuesday.  Was seen at urgent care and prescribed 7 days of azithromycin.. For the past 2-3 days noted to have  chills and fatigue.  Today developed SOB, hence came to ED. On admission to ED SpO2 85% on room air, COVId19 +.    COVID 19 + infection  Viral pneumonia  Acute hypoxic respiratory failure    - patient is remains on hi gilbert  - d dimer wnl- consider CTA chest  - covid PCR 11/18- plasma unlikely to be beneficial  - CRP 13. PCT 0.17  - c/w remdesivir for now  - decadron 6 mg daily while on remdesivir  - Avoid antibiotics unless there is a concern for a bacterial infection  - VTE prophylaxis per current [facility] COVID 19 protocol  - Check CBC with diff, CMP with LFT's daily, Inflammatory markers ( CRP, Ferritin, D dimer)  q48h.    - Encourage self proning q2h and ambulation, incentive spirometry as tolerated  - Taper off O2 as tolerated- once tolerating room air would ideally monitor for 24h prior to discharge.  - Inpatient Contact/Airborne isolation         Discussed with Dr Aponte  Will follow

## 2020-11-27 NOTE — PROGRESS NOTE ADULT - ASSESSMENT
76 y/o M w/ a PMH of CAD, gout, obesity comes in c/o worsening SOB w/ associated chills.  Pt was found to be COVID + in the community and COVID PCR in hospital also +.  ID consulted, pt on decadron and started on Remdesivire.  Pt has had increased requirement in O2 since admission.  Now on high flow ox sat is stable over 90     #Acute hypoxic respiratory failure 2/2 COVID PNA  - Inflammatory markers improved, monitor q48h  - Encourage proning, incentive spirometry, and OOB/ambulation  - C/w Remdesivir, monitor LFTs  - C/w Decadron 6mg daily  - ID following  - c/w Vitamin C, Thiamine and Zinc  - MDI q4h and Tylenol for fever   - Supplemental oxygen prn, wean as tolerated. Back on HFNC 100% FiO2, wean as tolerated.  - D-dimer is negative, CTA Chest ordered to r/o PE given persistent hypoxia.    #Hx of CAD  - c/w aspirin and atorvastatin     #Hx of gout  - c/w Allopurinol 300 mg po daily     #B/l LE edema  - Likely secondary to venous stasis  - Encourage ambulation and SCDs    DVT ppx:  - Enoxaparin 40 mg sc q12h given COVID status    Pt's wife updated regarding clinical condition.    Dispo: Pending clinical course, still requiring high amounts of HFNC.

## 2020-11-27 NOTE — PROGRESS NOTE ADULT - SUBJECTIVE AND OBJECTIVE BOX
[facility]  INFECTIOUS DISEASES AND INTERNAL MEDICINE at [place]  =======================================================  Pablo Villagran MD  Diplomates American Board of Internal Medicine and Infectious Diseases  Tel: [phone]      Fax: [phone]  =======================================================    DONTA CRABTREE [id number]    Follow up: covid19  patient remains on hi gilbert  states he feels better today and breathing has improved    Allergies:  No Known Allergies      Medications:  acetaminophen   Tablet .. 650 milliGRAM(s) Oral every 4 hours PRN  ALBUTerol    90 MICROgram(s) HFA Inhaler 2 Puff(s) Inhalation every 4 hours PRN  allopurinol 300 milliGRAM(s) Oral daily  artificial tears (preservative free) Ophthalmic Solution 1 Drop(s) Both EYES two times a day  ascorbic acid 500 milliGRAM(s) Oral daily  aspirin  chewable 81 milliGRAM(s) Oral daily  enoxaparin Injectable 40 milliGRAM(s) SubCutaneous every 12 hours  pantoprazole    Tablet 40 milliGRAM(s) Oral before breakfast  polyethylene glycol 3350 17 Gram(s) Oral two times a day  remdesivir  IVPB 100 milliGRAM(s) IV Intermittent every 24 hours  senna 2 Tablet(s) Oral at bedtime  sodium chloride 0.65% Nasal 1 Spray(s) Both Nostrils four times a day PRN            REVIEW OF SYSTEMS:  CONSTITUTIONAL:  No Fever or chills  HEENT:   No diplopia or blurred vision.  No earache, sore throat or runny nose.  CARDIOVASCULAR:  No pressure, squeezing, strangling, tightness, heaviness or aching about the chest, neck, axilla or epigastrium.  RESPIRATORY:  No cough, shortness of breath  GASTROINTESTINAL:  No nausea, vomiting or diarrhea.  GENITOURINARY:  No dysuria, frequency or urgency. No Blood in urine  MUSCULOSKELETAL:  no joint aches, no muscle pain  SKIN:  No change in skin, hair or nails.  NEUROLOGIC:  No Headaches, seizures or weakness.  PSYCHIATRIC:  No disorder of thought or mood.  ENDOCRINE:  No heat or cold intolerance  HEMATOLOGICAL:  No easy bruising or bleeding.            Physical Exam:  ICU Vital Signs Last 24 Hrs  T(C): 36.8 (27 Nov 2020 07:49), Max: 37 (26 Nov 2020 16:42)  T(F): 98.3 (27 Nov 2020 07:49), Max: 98.6 (26 Nov 2020 16:42)  HR: 84 (27 Nov 2020 07:49) (61 - 84)  BP: 123/71 (27 Nov 2020 07:49) (117/72 - 137/78)  BP(mean): --  ABP: --  ABP(mean): --  RR: 20 (27 Nov 2020 07:49) (20 - 20)  SpO2: 90% (27 Nov 2020 07:49) (89% - 91%)      GEN: NAD, pleasant sitting up chiar on hi gilbert  HEENT: normocephalic and atraumatic. EOMI. PERRL.  Anicteric   NECK: Supple.   LUNGS: Clear to auscultation.  HEART: Regular rate and rhythm without murmur.  ABDOMEN: Soft, nontender, and nondistended.  Positive bowel sounds.    : No CVA tenderness  EXTREMITIES: + 1edema.  MSK: no joint swelling  NEUROLOGIC: Cranial nerves II through XII are grossly intact. No focal deficits  PSYCHIATRIC: Appropriate affect .  SKIN: No Rash      Labs:                          12.6   11.84 )-----------( 358      ( 27 Nov 2020 08:45 )             39.0   11-27    134<L>  |  99  |  25.0<H>  ----------------------------<  89  4.6   |  23.0  |  0.95    Ca    7.9<L>      27 Nov 2020 08:45    TPro  5.9<L>  /  Alb  2.7<L>  /  TBili  0.6  /  DBili  0.2  /  AST  27  /  ALT  38  /  AlkPhos  53  11-27

## 2020-11-28 LAB
ALBUMIN SERPL ELPH-MCNC: 2.8 G/DL — LOW (ref 3.3–5.2)
ALBUMIN SERPL ELPH-MCNC: 2.9 G/DL — LOW (ref 3.3–5.2)
ALP SERPL-CCNC: 50 U/L — SIGNIFICANT CHANGE UP (ref 40–120)
ALP SERPL-CCNC: 53 U/L — SIGNIFICANT CHANGE UP (ref 40–120)
ALT FLD-CCNC: 34 U/L — SIGNIFICANT CHANGE UP
ALT FLD-CCNC: 35 U/L — SIGNIFICANT CHANGE UP
ANION GAP SERPL CALC-SCNC: 11 MMOL/L — SIGNIFICANT CHANGE UP (ref 5–17)
AST SERPL-CCNC: 22 U/L — SIGNIFICANT CHANGE UP
AST SERPL-CCNC: 24 U/L — SIGNIFICANT CHANGE UP
BILIRUB DIRECT SERPL-MCNC: 0.2 MG/DL — SIGNIFICANT CHANGE UP (ref 0–0.3)
BILIRUB INDIRECT FLD-MCNC: 0.4 MG/DL — SIGNIFICANT CHANGE UP (ref 0.2–1)
BILIRUB SERPL-MCNC: 0.6 MG/DL — SIGNIFICANT CHANGE UP (ref 0.4–2)
BILIRUB SERPL-MCNC: 0.6 MG/DL — SIGNIFICANT CHANGE UP (ref 0.4–2)
BUN SERPL-MCNC: 27 MG/DL — HIGH (ref 8–20)
CALCIUM SERPL-MCNC: 8.3 MG/DL — LOW (ref 8.6–10.2)
CHLORIDE SERPL-SCNC: 99 MMOL/L — SIGNIFICANT CHANGE UP (ref 98–107)
CO2 SERPL-SCNC: 26 MMOL/L — SIGNIFICANT CHANGE UP (ref 22–29)
CREAT SERPL-MCNC: 0.98 MG/DL — SIGNIFICANT CHANGE UP (ref 0.5–1.3)
CRP SERPL-MCNC: 7.12 MG/DL — HIGH (ref 0–0.4)
D DIMER BLD IA.RAPID-MCNC: 157 NG/ML DDU — SIGNIFICANT CHANGE UP
ERYTHROCYTE [SEDIMENTATION RATE] IN BLOOD: 46 MM/HR — HIGH (ref 0–20)
FERRITIN SERPL-MCNC: 197 NG/ML — SIGNIFICANT CHANGE UP (ref 30–400)
FIBRINOGEN AG PPP IA-MCNC: 617 MG/DL — HIGH
GLUCOSE SERPL-MCNC: 128 MG/DL — HIGH (ref 70–99)
HCT VFR BLD CALC: 38.8 % — LOW (ref 39–50)
HGB BLD-MCNC: 12.6 G/DL — LOW (ref 13–17)
MCHC RBC-ENTMCNC: 29.5 PG — SIGNIFICANT CHANGE UP (ref 27–34)
MCHC RBC-ENTMCNC: 32.5 GM/DL — SIGNIFICANT CHANGE UP (ref 32–36)
MCV RBC AUTO: 90.9 FL — SIGNIFICANT CHANGE UP (ref 80–100)
PLATELET # BLD AUTO: 366 K/UL — SIGNIFICANT CHANGE UP (ref 150–400)
POTASSIUM SERPL-MCNC: 4.7 MMOL/L — SIGNIFICANT CHANGE UP (ref 3.5–5.3)
POTASSIUM SERPL-SCNC: 4.7 MMOL/L — SIGNIFICANT CHANGE UP (ref 3.5–5.3)
PROT SERPL-MCNC: 5.9 G/DL — LOW (ref 6.6–8.7)
PROT SERPL-MCNC: 6.1 G/DL — LOW (ref 6.6–8.7)
RBC # BLD: 4.27 M/UL — SIGNIFICANT CHANGE UP (ref 4.2–5.8)
RBC # FLD: 16.3 % — HIGH (ref 10.3–14.5)
SODIUM SERPL-SCNC: 136 MMOL/L — SIGNIFICANT CHANGE UP (ref 135–145)
WBC # BLD: 13.9 K/UL — HIGH (ref 3.8–10.5)
WBC # FLD AUTO: 13.9 K/UL — HIGH (ref 3.8–10.5)

## 2020-11-28 PROCEDURE — 99232 SBSQ HOSP IP/OBS MODERATE 35: CPT | Mod: CS

## 2020-11-28 PROCEDURE — 99233 SBSQ HOSP IP/OBS HIGH 50: CPT | Mod: CS

## 2020-11-28 RX ORDER — ATORVASTATIN CALCIUM 80 MG/1
10 TABLET, FILM COATED ORAL AT BEDTIME
Refills: 0 | Status: DISCONTINUED | OUTPATIENT
Start: 2020-11-28 | End: 2020-12-04

## 2020-11-28 RX ADMIN — Medication 500 MILLIGRAM(S): at 08:33

## 2020-11-28 RX ADMIN — PANTOPRAZOLE SODIUM 40 MILLIGRAM(S): 20 TABLET, DELAYED RELEASE ORAL at 05:38

## 2020-11-28 RX ADMIN — ATORVASTATIN CALCIUM 10 MILLIGRAM(S): 80 TABLET, FILM COATED ORAL at 22:27

## 2020-11-28 RX ADMIN — Medication 300 MILLIGRAM(S): at 08:33

## 2020-11-28 RX ADMIN — POLYETHYLENE GLYCOL 3350 17 GRAM(S): 17 POWDER, FOR SOLUTION ORAL at 16:11

## 2020-11-28 RX ADMIN — Medication 81 MILLIGRAM(S): at 08:33

## 2020-11-28 RX ADMIN — POLYETHYLENE GLYCOL 3350 17 GRAM(S): 17 POWDER, FOR SOLUTION ORAL at 05:38

## 2020-11-28 RX ADMIN — Medication 1 DROP(S): at 16:12

## 2020-11-28 RX ADMIN — ENOXAPARIN SODIUM 40 MILLIGRAM(S): 100 INJECTION SUBCUTANEOUS at 16:11

## 2020-11-28 RX ADMIN — REMDESIVIR 500 MILLIGRAM(S): 5 INJECTION INTRAVENOUS at 16:12

## 2020-11-28 RX ADMIN — ENOXAPARIN SODIUM 40 MILLIGRAM(S): 100 INJECTION SUBCUTANEOUS at 05:41

## 2020-11-28 RX ADMIN — Medication 6 MILLIGRAM(S): at 05:40

## 2020-11-28 NOTE — PROGRESS NOTE ADULT - ASSESSMENT
76 yo  obese male from home, lives with wife, with PMHs of CAD s/p multiple stents, BEV, HLD, diverticulitis, psoriasis and gout presented with SOB. Per patient, he was tested positive for covid last Tuesday.  Was seen at urgent care and prescribed 7 days of azithromycin.. For the past 2-3 days noted to have  chills and fatigue.  Today developed SOB, hence came to ED. On admission to ED SpO2 85% on room air, COVId19 +.      COVID 19 + infection  Viral pneumonia  Acute hypoxic respiratory failure      - patient is remains on hi gilbert  - covid PCR 11/18- plasma unlikely to be beneficial  - CRP 7.12. PCT 0.1  - c/w remdesivir will complete 10 days today  - decadron 6 mg daily while on remdesivir  - Avoid antibiotics unless there is a concern for a bacterial infection  - VTE prophylaxis per current [facility] COVID 19 protocol  - Check CBC with diff, CMP with LFT's daily, Inflammatory markers ( CRP, Ferritin, D dimer)  q48h.    - Encourage self proning q2h and ambulation, incentive spirometry as tolerated  - Taper off O2 as tolerated- once tolerating room air would ideally monitor for 24h prior to discharge.  - Inpatient Contact/Airborne isolation   - Obtain advance directives         Will follow

## 2020-11-28 NOTE — PROGRESS NOTE ADULT - SUBJECTIVE AND OBJECTIVE BOX
[facility]  INFECTIOUS DISEASES AND INTERNAL MEDICINE at [place]  =======================================================  Pablo Villagran MD  Diplomates American Board of Internal Medicine and Infectious Diseases  Tel: [phone]      Fax: [phone]  =======================================================    DONTA CRABRTEE [id number]    Follow up: covid19  patient remains on hi gilbert    No improvement since yesterday       Allergies:  No Known Allergies       REVIEW OF SYSTEMS:  CONSTITUTIONAL:  No Fever or chills  HEENT:   No diplopia or blurred vision.  No earache, sore throat or runny nose.  CARDIOVASCULAR:  No pressure, squeezing, strangling, tightness, heaviness or aching about the chest, neck, axilla or epigastrium.  RESPIRATORY:  + cough, + shortness of breath  GASTROINTESTINAL:  No nausea, vomiting or diarrhea.  GENITOURINARY:  No dysuria, frequency or urgency. No Blood in urine  MUSCULOSKELETAL:  no joint aches, no muscle pain  SKIN:  No change in skin, hair or nails.  NEUROLOGIC:  No Headaches, seizures or weakness.  PSYCHIATRIC:  No disorder of thought or mood.  ENDOCRINE:  No heat or cold intolerance  HEMATOLOGICAL:  No easy bruising or bleeding.       Physical Exam:  GEN: NAD, pleasant sitting up chair on hi gilbert  HEENT: normocephalic and atraumatic. EOMI. PERRL.  Anicteric   NECK: Supple.   LUNGS: Clear to auscultation.  HEART: Regular rate and rhythm   ABDOMEN: Soft, nontender, and nondistended.  Positive bowel sounds.    : No CVA tenderness  EXTREMITIES: + edema.  MSK: no joint swelling  NEUROLOGIC: No focal deficits  PSYCHIATRIC: Depressed affect .  SKIN: No Rash      Vitals:  T(F): 97.1 (28 Nov 2020 09:26), Max: 97.2 (27 Nov 2020 21:47)  HR: 62 (28 Nov 2020 09:26)  BP: 115/72 (28 Nov 2020 09:26)  RR: 20 (28 Nov 2020 09:26)  SpO2: 91% (28 Nov 2020 09:26) (90% - 95%)  temp max in last 48H T(F): , Max: 98.6 (11-26-20 @ 16:42)      Current Antibiotics:  remdesivir  IVPB 100 milliGRAM(s) IV Intermittent every 24 hours      Other medications:  allopurinol 300 milliGRAM(s) Oral daily  artificial tears (preservative free) Ophthalmic Solution 1 Drop(s) Both EYES two times a day  ascorbic acid 500 milliGRAM(s) Oral daily  aspirin  chewable 81 milliGRAM(s) Oral daily  dexAMETHasone  Injectable 6 milliGRAM(s) IV Push daily  enoxaparin Injectable 40 milliGRAM(s) SubCutaneous every 12 hours  pantoprazole    Tablet 40 milliGRAM(s) Oral before breakfast  polyethylene glycol 3350 17 Gram(s) Oral two times a day  senna 2 Tablet(s) Oral at bedtime      Labs:             12.6   13.90 )-----------( 366      ( 28 Nov 2020 10:01 )             38.8      11-28    136  |  99  |  27.0<H>  ----------------------------<  128<H>  4.7   |  26.0  |  0.98    Ca    8.3<L>      28 Nov 2020 10:01    TPro  5.9<L>  /  Alb  2.8<L>  /  TBili  0.6  /  DBili  0.2  /  AST  22  /  ALT  35  /  AlkPhos  50  11-28      WBC Count: 13.90 K/uL (11-28-20 @ 10:01)  WBC Count: 11.84 K/uL (11-27-20 @ 08:45)  WBC Count: 10.54 K/uL (11-26-20 @ 11:09)  WBC Count: 10.83 K/uL (11-25-20 @ 07:16)  WBC Count: 12.28 K/uL (11-24-20 @ 07:42)    Creatinine, Serum: 0.98 mg/dL (11-28-20 @ 10:01)  Creatinine, Serum: 0.95 mg/dL (11-27-20 @ 08:45)  Creatinine, Serum: 1.12 mg/dL (11-26-20 @ 11:09)  Creatinine, Serum: 1.08 mg/dL (11-25-20 @ 07:16)  Creatinine, Serum: 1.22 mg/dL (11-24-20 @ 07:42)    C-Reactive Protein, Serum: 7.12 mg/dL (11-28-20 @ 10:01)  C-Reactive Protein, Serum: 7.89 mg/dL (11-26-20 @ 11:09)  C-Reactive Protein, Serum: 6.89 mg/dL (11-25-20 @ 18:26)  C-Reactive Protein, Serum: 5.31 mg/dL (11-24-20 @ 07:42)  C-Reactive Protein, Serum: 7.11 mg/dL (11-22-20 @ 08:37)  C-Reactive Protein, Serum: 13.12 mg/dL (11-18-20 @ 14:00)    Ferritin, Serum: 197 ng/mL (11-28-20 @ 10:01)  Ferritin, Serum: 167 ng/mL (11-25-20 @ 18:26)  Ferritin, Serum: 180 ng/mL (11-24-20 @ 07:42)  Ferritin, Serum: 254 ng/mL (11-22-20 @ 08:37)  Ferritin, Serum: 155 ng/mL (11-18-20 @ 14:00)    Procalcitonin, Serum: 0.10 ng/mL (11-26-20 @ 11:09)  Procalcitonin, Serum: 0.12 ng/mL (11-22-20 @ 08:37)  Procalcitonin, Serum: 0.17 ng/mL (11-18-20 @ 14:00)    COVID-19 IgG Antibody Interpretation: Negative (11-19-20 @ 16:36)  COVID-19 IgG Antibody Index: 0.37 Index (11-19-20 @ 16:36)  COVID-19 PCR: Detected (11-18-20 @ 13:33)

## 2020-11-28 NOTE — PROGRESS NOTE ADULT - ASSESSMENT
75 year old male with PMH HTN, Dyslipidemia, CAD s/p PCI, BPH, gout, obesity/BEV, Diverticulitis comes in c/o worsening SOB w/ associated chills.  Pt was found to be COVID + in the community and COVID PCR in hospital also +.  ID consulted, pt on decadron and started on Remdesivire.  Pt has had increased requirement in O2 since admission.  Now on high flow ox sat is stable over 90     #Acute hypoxic respiratory failure 2/2 COVID PNA  - Inflammatory markers improved, monitor q48h  - Encourage proning, incentive spirometry, and OOB/ambulation  - C/w Remdesivir, monitor LFTs  - C/w Decadron 6mg daily  - c/w Vitamin C, Thiamine and Zinc  - MDI q4h and Tylenol for fever   - Supplemental oxygen prn, wean as tolerated.   - D-dimer is negative, CTA Chest ordered to r/o PE given persistent hypoxia.    #Hx of CAD  - c/w aspirin and atorvastatin     #Hx of gout  - c/w Allopurinol 300 mg po daily     #B/l LE edema  - Likely secondary to venous stasis  - Encourage ambulation and SCDs    DVT ppx:  - Enoxaparin 40 mg sc q12h given COVID status      Disposition - Pending clinical improvement; possible home vs Rehab

## 2020-11-28 NOTE — PROGRESS NOTE ADULT - SUBJECTIVE AND OBJECTIVE BOX
HOSPITALIST PROGRESS NOTE    DONTA CRABTREE  [id number]  75yMale    Patient is a 75y old  Male who presents with a chief complaint of SOB (27 Nov 2020 13:01)      SUBJECTIVE:   Chart reviewed since admission  Patient seen and examined at bedside for AHRF, COVID-19 pneumonia.  Feels comfortable on HFNC - slightly better every day.  Denies any chest pain, palpitations, dizziness, nausea, vomiting or abdominal pain      OBJECTIVE:  Vital Signs Last 24 Hrs  T(C): 36.2 (28 Nov 2020 09:26), Max: 36.2 (27 Nov 2020 21:47)  T(F): 97.1 (28 Nov 2020 09:26), Max: 97.2 (27 Nov 2020 21:47)  HR: 62 (28 Nov 2020 09:26) (55 - 79)  BP: 115/72 (28 Nov 2020 09:26) (109/70 - 136/76)   RR: 20 (28 Nov 2020 09:26) (18 - 20)  SpO2: 91% (28 Nov 2020 09:26) (90% - 95%)    PHYSICAL EXAMINATION  General: Sitting in chair - NAD  HEENT:  HFNC  NECK:  supple  CVS: regular rate and rhythm S1 S2  RESP:  Fair air entry bilaterally  GI:  Soft nondistended nontender BS+  : No suprapubic tenderness  MSK:  FROM, no edema  CNS:  No gross focal or global deficit noted  INTEG:  warm dry skin  PSYCH:  Fair mood    MONITOR:  CAPILLARY BLOOD GLUCOSE            I&O's Summary    27 Nov 2020 07:01  -  28 Nov 2020 07:00  --------------------------------------------------------  IN: 970 mL / OUT: 700 mL / NET: 270 mL    28 Nov 2020 07:01  -  28 Nov 2020 15:00  --------------------------------------------------------  IN: 0 mL / OUT: 400 mL / NET: -400 mL                            12.6   13.90 )-----------( 366      ( 28 Nov 2020 10:01 )             38.8       11-28    136  |  99  |  27.0<H>  ----------------------------<  128<H>  4.7   |  26.0  |  0.98    Ca    8.3<L>      28 Nov 2020 10:01    TPro  5.9<L>  /  Alb  2.8<L>  /  TBili  0.6  /  DBili  0.2  /  AST  22  /  ALT  35  /  AlkPhos  50  11-28            Culture:    TTE:    RADIOLOGY  < from: US Duplex Venous Lower Ext Complete, Bilateral (11.27.20 @ 13:55) >     EXAM:  US DPLX LWR EXT VEINS COMPL BI                          PROCEDURE DATE:  11/27/2020          INTERPRETATION:  CLINICAL INFORMATION: Leg swelling, COVID positive.    COMPARISON: None available.    TECHNIQUE: Duplex sonography of the BILATERAL LOWER extremity veins with color and spectral Doppler, with and without compression.    FINDINGS:    There is normal compressibility of the bilateral common femoral, femoral and popliteal veins.  Doppler examination shows normal spontaneous and phasic flow.    No calf vein thrombosis is detected.    IMPRESSION:  No evidence of deep venous thrombosis in either lower extremity.            LILLIANA DUKES MD; Attending Radiologist  This document has been electronically signed. Nov 27 2020  2:26PM    < end of copied text >    < from: Xray Chest 1 View- PORTABLE-Urgent (11.18.20 @ 14:38) >     EXAM:  XR CHEST PORTABLE URGENT 1V                          PROCEDURE DATE:  11/18/2020          INTERPRETATION:  AP chest on November 18, 2020 at 2:12 PM. Patient is short of breath with cough and fever.    Heart is likely enlarged.    Present film shows a lingular infiltrate new since May 26, 2018.    IMPRESSION: Heart enlargement again noted. Lingular infiltrate at this time.            VERONICA TAVERAS MD; Attending Radiologist  This document has been electronically signed. Nov 18 2020  2:40PM    < end of copied text >      MEDICATIONS  (STANDING):  allopurinol 300 milliGRAM(s) Oral daily  artificial tears (preservative free) Ophthalmic Solution 1 Drop(s) Both EYES two times a day  ascorbic acid 500 milliGRAM(s) Oral daily  aspirin  chewable 81 milliGRAM(s) Oral daily  dexAMETHasone  Injectable 6 milliGRAM(s) IV Push daily  enoxaparin Injectable 40 milliGRAM(s) SubCutaneous every 12 hours  pantoprazole    Tablet 40 milliGRAM(s) Oral before breakfast  polyethylene glycol 3350 17 Gram(s) Oral two times a day  remdesivir  IVPB 100 milliGRAM(s) IV Intermittent every 24 hours  senna 2 Tablet(s) Oral at bedtime      MEDICATIONS  (PRN):  acetaminophen   Tablet .. 650 milliGRAM(s) Oral every 4 hours PRN Temp greater or equal to 38.5C (101.3F)  ALBUTerol    90 MICROgram(s) HFA Inhaler 2 Puff(s) Inhalation every 4 hours PRN Shortness of Breath and/or Wheezing  sodium chloride 0.65% Nasal 1 Spray(s) Both Nostrils four times a day PRN Nasal Congestion

## 2020-11-29 LAB
ACANTHOCYTES BLD QL SMEAR: SLIGHT — SIGNIFICANT CHANGE UP
ALBUMIN SERPL ELPH-MCNC: 2.6 G/DL — LOW (ref 3.3–5.2)
ALBUMIN SERPL ELPH-MCNC: 2.7 G/DL — LOW (ref 3.3–5.2)
ALP SERPL-CCNC: 52 U/L — SIGNIFICANT CHANGE UP (ref 40–120)
ALP SERPL-CCNC: 52 U/L — SIGNIFICANT CHANGE UP (ref 40–120)
ALT FLD-CCNC: 29 U/L — SIGNIFICANT CHANGE UP
ALT FLD-CCNC: 30 U/L — SIGNIFICANT CHANGE UP
ANION GAP SERPL CALC-SCNC: 9 MMOL/L — SIGNIFICANT CHANGE UP (ref 5–17)
ANISOCYTOSIS BLD QL: SLIGHT — SIGNIFICANT CHANGE UP
AST SERPL-CCNC: 22 U/L — SIGNIFICANT CHANGE UP
AST SERPL-CCNC: 22 U/L — SIGNIFICANT CHANGE UP
BASOPHILS # BLD AUTO: 0 K/UL — SIGNIFICANT CHANGE UP (ref 0–0.2)
BASOPHILS NFR BLD AUTO: 0 % — SIGNIFICANT CHANGE UP (ref 0–2)
BILIRUB DIRECT SERPL-MCNC: 0.3 MG/DL — SIGNIFICANT CHANGE UP (ref 0–0.3)
BILIRUB INDIRECT FLD-MCNC: 0.4 MG/DL — SIGNIFICANT CHANGE UP (ref 0.2–1)
BILIRUB SERPL-MCNC: 0.7 MG/DL — SIGNIFICANT CHANGE UP (ref 0.4–2)
BILIRUB SERPL-MCNC: 0.7 MG/DL — SIGNIFICANT CHANGE UP (ref 0.4–2)
BUN SERPL-MCNC: 26 MG/DL — HIGH (ref 8–20)
BURR CELLS BLD QL SMEAR: PRESENT — SIGNIFICANT CHANGE UP
CALCIUM SERPL-MCNC: 7.9 MG/DL — LOW (ref 8.6–10.2)
CHLORIDE SERPL-SCNC: 99 MMOL/L — SIGNIFICANT CHANGE UP (ref 98–107)
CO2 SERPL-SCNC: 25 MMOL/L — SIGNIFICANT CHANGE UP (ref 22–29)
CREAT SERPL-MCNC: 1.12 MG/DL — SIGNIFICANT CHANGE UP (ref 0.5–1.3)
ELLIPTOCYTES BLD QL SMEAR: SLIGHT — SIGNIFICANT CHANGE UP
EOSINOPHIL # BLD AUTO: 0 K/UL — SIGNIFICANT CHANGE UP (ref 0–0.5)
EOSINOPHIL NFR BLD AUTO: 0 % — SIGNIFICANT CHANGE UP (ref 0–6)
GIANT PLATELETS BLD QL SMEAR: PRESENT — SIGNIFICANT CHANGE UP
GLUCOSE SERPL-MCNC: 129 MG/DL — HIGH (ref 70–99)
HCT VFR BLD CALC: 40 % — SIGNIFICANT CHANGE UP (ref 39–50)
HGB BLD-MCNC: 13 G/DL — SIGNIFICANT CHANGE UP (ref 13–17)
HYPOCHROMIA BLD QL: SLIGHT — SIGNIFICANT CHANGE UP
LYMPHOCYTES # BLD AUTO: 0.34 K/UL — LOW (ref 1–3.3)
LYMPHOCYTES # BLD AUTO: 2.6 % — LOW (ref 13–44)
MACROCYTES BLD QL: SLIGHT — SIGNIFICANT CHANGE UP
MANUAL SMEAR VERIFICATION: SIGNIFICANT CHANGE UP
MCHC RBC-ENTMCNC: 29.9 PG — SIGNIFICANT CHANGE UP (ref 27–34)
MCHC RBC-ENTMCNC: 32.5 GM/DL — SIGNIFICANT CHANGE UP (ref 32–36)
MCV RBC AUTO: 92 FL — SIGNIFICANT CHANGE UP (ref 80–100)
MICROCYTES BLD QL: SLIGHT — SIGNIFICANT CHANGE UP
MONOCYTES # BLD AUTO: 0.34 K/UL — SIGNIFICANT CHANGE UP (ref 0–0.9)
MONOCYTES NFR BLD AUTO: 2.6 % — SIGNIFICANT CHANGE UP (ref 2–14)
MYELOCYTES NFR BLD: 0.9 % — HIGH (ref 0–0)
NEUTROPHILS # BLD AUTO: 12.44 K/UL — HIGH (ref 1.8–7.4)
NEUTROPHILS NFR BLD AUTO: 93.9 % — HIGH (ref 43–77)
NT-PROBNP SERPL-SCNC: 169 PG/ML — SIGNIFICANT CHANGE UP (ref 0–300)
OVALOCYTES BLD QL SMEAR: SLIGHT — SIGNIFICANT CHANGE UP
PLAT MORPH BLD: ABNORMAL
PLATELET # BLD AUTO: 328 K/UL — SIGNIFICANT CHANGE UP (ref 150–400)
POIKILOCYTOSIS BLD QL AUTO: SIGNIFICANT CHANGE UP
POLYCHROMASIA BLD QL SMEAR: SLIGHT — SIGNIFICANT CHANGE UP
POTASSIUM SERPL-MCNC: 4.6 MMOL/L — SIGNIFICANT CHANGE UP (ref 3.5–5.3)
POTASSIUM SERPL-SCNC: 4.6 MMOL/L — SIGNIFICANT CHANGE UP (ref 3.5–5.3)
PROT SERPL-MCNC: 5.8 G/DL — LOW (ref 6.6–8.7)
PROT SERPL-MCNC: 5.9 G/DL — LOW (ref 6.6–8.7)
RBC # BLD: 4.35 M/UL — SIGNIFICANT CHANGE UP (ref 4.2–5.8)
RBC # FLD: 16.6 % — HIGH (ref 10.3–14.5)
RBC BLD AUTO: ABNORMAL
SCHISTOCYTES BLD QL AUTO: SLIGHT — SIGNIFICANT CHANGE UP
SODIUM SERPL-SCNC: 133 MMOL/L — LOW (ref 135–145)
WBC # BLD: 13.25 K/UL — HIGH (ref 3.8–10.5)
WBC # FLD AUTO: 13.25 K/UL — HIGH (ref 3.8–10.5)

## 2020-11-29 PROCEDURE — 99233 SBSQ HOSP IP/OBS HIGH 50: CPT | Mod: CS

## 2020-11-29 PROCEDURE — 99232 SBSQ HOSP IP/OBS MODERATE 35: CPT | Mod: CS

## 2020-11-29 RX ADMIN — Medication 300 MILLIGRAM(S): at 10:51

## 2020-11-29 RX ADMIN — ENOXAPARIN SODIUM 40 MILLIGRAM(S): 100 INJECTION SUBCUTANEOUS at 18:50

## 2020-11-29 RX ADMIN — SENNA PLUS 2 TABLET(S): 8.6 TABLET ORAL at 22:21

## 2020-11-29 RX ADMIN — Medication 1 DROP(S): at 18:50

## 2020-11-29 RX ADMIN — PANTOPRAZOLE SODIUM 40 MILLIGRAM(S): 20 TABLET, DELAYED RELEASE ORAL at 05:41

## 2020-11-29 RX ADMIN — Medication 81 MILLIGRAM(S): at 10:51

## 2020-11-29 RX ADMIN — ENOXAPARIN SODIUM 40 MILLIGRAM(S): 100 INJECTION SUBCUTANEOUS at 05:39

## 2020-11-29 RX ADMIN — POLYETHYLENE GLYCOL 3350 17 GRAM(S): 17 POWDER, FOR SOLUTION ORAL at 18:49

## 2020-11-29 RX ADMIN — Medication 6 MILLIGRAM(S): at 05:40

## 2020-11-29 RX ADMIN — Medication 1 DROP(S): at 05:41

## 2020-11-29 RX ADMIN — ATORVASTATIN CALCIUM 10 MILLIGRAM(S): 80 TABLET, FILM COATED ORAL at 22:21

## 2020-11-29 RX ADMIN — Medication 500 MILLIGRAM(S): at 10:51

## 2020-11-29 NOTE — PROGRESS NOTE ADULT - SUBJECTIVE AND OBJECTIVE BOX
[facility]  INFECTIOUS DISEASES AND INTERNAL MEDICINE at [place]  =======================================================  Pablo Villagran MD  Diplomates American Board of Internal Medicine and Infectious Diseases  Tel: [phone]      Fax: [phone]  =======================================================    DONTA CRABTREE [id number]    Follow up: covid19  patient remains on hi gilbert    No improvement since yesterday       Allergies:  No Known Allergies       REVIEW OF SYSTEMS:  CONSTITUTIONAL:  No Fever or chills  HEENT:   No diplopia or blurred vision.  No earache, sore throat or runny nose.  CARDIOVASCULAR:  No pressure, squeezing, strangling, tightness, heaviness or aching about the chest, neck, axilla or epigastrium.  RESPIRATORY:  + cough, + shortness of breath  GASTROINTESTINAL:  No nausea, vomiting or diarrhea.  GENITOURINARY:  No dysuria, frequency or urgency. No Blood in urine  MUSCULOSKELETAL:  no joint aches, no muscle pain  SKIN:  No change in skin, hair or nails.  NEUROLOGIC:  No Headaches, seizures or weakness.  PSYCHIATRIC:  No disorder of thought or mood.  ENDOCRINE:  No heat or cold intolerance  HEMATOLOGICAL:  No easy bruising or bleeding.       Physical Exam:  GEN: NAD, pleasant sitting up chair on hi gilbert  HEENT: normocephalic and atraumatic. EOMI. PERRL.  Anicteric   NECK: Supple.   LUNGS: Clear to auscultation.  HEART: Regular rate and rhythm   ABDOMEN: Soft, nontender, and nondistended.  Positive bowel sounds.    : No CVA tenderness  EXTREMITIES: + edema.  MSK: no joint swelling  NEUROLOGIC: No focal deficits  PSYCHIATRIC: Depressed affect .  SKIN: No Rash      Vitals:    T(F): 98.8 (29 Nov 2020 09:48), Max: 98.8 (29 Nov 2020 09:48)  HR: 93 (29 Nov 2020 12:50)  BP: 117/63 (29 Nov 2020 09:48)  RR: 18 (29 Nov 2020 09:48)  SpO2: 97% (29 Nov 2020 12:50) (92% - 97%)  temp max in last 48H T(F): , Max: 98.8 (11-29-20 @ 09:48)      Current Antibiotics:    Other medications:  allopurinol 300 milliGRAM(s) Oral daily  artificial tears (preservative free) Ophthalmic Solution 1 Drop(s) Both EYES two times a day  ascorbic acid 500 milliGRAM(s) Oral daily  aspirin  chewable 81 milliGRAM(s) Oral daily  atorvastatin 10 milliGRAM(s) Oral at bedtime  dexAMETHasone  Injectable 6 milliGRAM(s) IV Push daily  enoxaparin Injectable 40 milliGRAM(s) SubCutaneous every 12 hours  pantoprazole    Tablet 40 milliGRAM(s) Oral before breakfast  polyethylene glycol 3350 17 Gram(s) Oral two times a day  senna 2 Tablet(s) Oral at bedtime        Labs:                        13.0   13.25 )-----------( 328      ( 29 Nov 2020 11:01 )             40.0      11-29    133<L>  |  99  |  26.0<H>  ----------------------------<  129<H>  4.6   |  25.0  |  1.12    Ca    7.9<L>      29 Nov 2020 11:01    TPro  5.8<L>  /  Alb  2.6<L>  /  TBili  0.7  /  DBili  0.3  /  AST  22  /  ALT  29  /  AlkPhos  52  11-29      WBC Count: 13.25 K/uL (11-29-20 @ 11:01)  WBC Count: 13.90 K/uL (11-28-20 @ 10:01)  WBC Count: 11.84 K/uL (11-27-20 @ 08:45)  WBC Count: 10.54 K/uL (11-26-20 @ 11:09)  WBC Count: 10.83 K/uL (11-25-20 @ 07:16)    Creatinine, Serum: 1.12 mg/dL (11-29-20 @ 11:01)  Creatinine, Serum: 0.98 mg/dL (11-28-20 @ 10:01)  Creatinine, Serum: 0.95 mg/dL (11-27-20 @ 08:45)  Creatinine, Serum: 1.12 mg/dL (11-26-20 @ 11:09)  Creatinine, Serum: 1.08 mg/dL (11-25-20 @ 07:16)    C-Reactive Protein, Serum: 7.12 mg/dL (11-28-20 @ 10:01)  C-Reactive Protein, Serum: 7.89 mg/dL (11-26-20 @ 11:09)  C-Reactive Protein, Serum: 6.89 mg/dL (11-25-20 @ 18:26)  C-Reactive Protein, Serum: 5.31 mg/dL (11-24-20 @ 07:42)  C-Reactive Protein, Serum: 7.11 mg/dL (11-22-20 @ 08:37)  C-Reactive Protein, Serum: 13.12 mg/dL (11-18-20 @ 14:00)    Ferritin, Serum: 197 ng/mL (11-28-20 @ 10:01)  Ferritin, Serum: 167 ng/mL (11-25-20 @ 18:26)  Ferritin, Serum: 180 ng/mL (11-24-20 @ 07:42)  Ferritin, Serum: 254 ng/mL (11-22-20 @ 08:37)  Ferritin, Serum: 155 ng/mL (11-18-20 @ 14:00)    Sedimentation Rate, Erythrocyte: 46 mm/hr (11-28-20 @ 10:01)    Procalcitonin, Serum: 0.10 ng/mL (11-26-20 @ 11:09)  Procalcitonin, Serum: 0.12 ng/mL (11-22-20 @ 08:37)  Procalcitonin, Serum: 0.17 ng/mL (11-18-20 @ 14:00)     COVID-19 IgG Antibody Interpretation: Negative (11-19-20 @ 16:36)  COVID-19 IgG Antibody Index: 0.37 Index (11-19-20 @ 16:36)  COVID-19 PCR: Detected (11-18-20 @ 13:33)      < from: US Duplex Venous Lower Ext Complete, Bilateral (11.27.20 @ 13:55) >  EXAM:  US DPLX LWR EXT VEINS COMPL BI                          PROCEDURE DATE:  11/27/2020      INTERPRETATION:  CLINICAL INFORMATION: Leg swelling, COVID positive.    COMPARISON: None available.    TECHNIQUE: Duplex sonography of the BILATERAL LOWER extremity veins with color and spectral Doppler, with and without compression.    FINDINGS:    There is normal compressibility of the bilateral common femoral, femoral and popliteal veins.  Doppler examination shows normal spontaneous and phasic flow.    No calf vein thrombosis is detected.    IMPRESSION:  No evidence of deep venous thrombosis in either lower extremity.    < end of copied text >

## 2020-11-29 NOTE — PROGRESS NOTE ADULT - ASSESSMENT
75 year old male with PMH HTN, Dyslipidemia, CAD s/p PCI, BPH, gout, obesity/BEV, Diverticulitis comes in c/o worsening SOB w/ associated chills.  Pt was found to be COVID + in the community and COVID PCR in hospital also +.  ID consulted, pt on decadron and started on Remdesivire.  Pt has had increased requirement in O2 since admission.  Now on high flow ox sat is stable over 90     #Acute hypoxic respiratory failure 2/2 COVID PNA - still requiring 100%HFNC  - Inflammatory markers improved, monitor q48h  - Encourage proning, incentive spirometry, and OOB/ambulation  - C/w Remdesivir, monitor LFTs  - C/w Decadron 6mg daily  - c/w Vitamin C, Thiamine and Zinc  - MDI q4h and Tylenol for fever   - Supplemental oxygen prn, wean as tolerated.   - D-dimer is negative, CTA Chest ordered to r/o PE given persistent hypoxia.    #Hx of CAD  - c/w aspirin and atorvastatin     #Hx of gout  - c/w Allopurinol 300 mg po daily     #B/l LE edema  - Likely secondary to venous stasis  - Encourage ambulation and SCDs    DVT ppx:  - Enoxaparin 40 mg sc q12h given COVID status      Disposition - Pending clinical improvement; possible home vs Rehab 75 year old male with PMH HTN, Dyslipidemia, CAD s/p PCI, BPH, gout, obesity/BEV, Diverticulitis comes in c/o worsening SOB w/ associated chills.  Pt was found to be COVID + in the community and COVID PCR in hospital also +.  ID consulted, pt on decadron and started on Remdesivire.  Pt has had increased requirement in O2 since admission.  Now on high flow ox sat is stable over 90     #Acute hypoxic respiratory failure 2/2 COVID PNA - still requiring 100%HFNC  - Inflammatory markers improved, monitor q48h  - Encourage proning, incentive spirometry, and OOB/ambulation  - C/w Remdesivir, monitor LFTs  - C/w Decadron 6mg daily  - c/w Vitamin C, Thiamine and Zinc  - MDI q4h and Tylenol for fever   - Supplemental oxygen prn, wean as tolerated.   - D-dimer is negative    #Hx of CAD  - c/w aspirin and atorvastatin     #Hx of gout  - c/w Allopurinol 300 mg po daily     #B/l LE edema  - Likely secondary to venous stasis  - Encourage ambulation and SCDs    DVT ppx:  - Enoxaparin 40 mg sc q12h given COVID status      Disposition - Pending clinical improvement; possible home vs Rehab

## 2020-11-29 NOTE — PROGRESS NOTE ADULT - ASSESSMENT
76 yo  obese male from home, lives with wife, with PMHs of CAD s/p multiple stents, BEV, HLD, diverticulitis, psoriasis and gout presented with SOB. Per patient, he was tested positive for covid last Tuesday.  Was seen at urgent care and prescribed 7 days of azithromycin.. For the past 2-3 days noted to have  chills and fatigue.  Today developed SOB, hence came to ED. On admission to ED SpO2 85% on room air, COVId19 +.      COVID 19 + infection  Viral pneumonia  Acute hypoxic respiratory failure      - patient is remains on hi gilbert  - covid PCR 11/18- plasma unlikely to be beneficial  - CRP 7.12. PCT 0.1  - remdesivir completed 10 days 11/28  - decadron 6 mg daily while on remdesivir  - Avoid antibiotics unless there is a concern for a bacterial infection  - VTE prophylaxis per current [facility] COVID 19 protocol  - Check CBC with diff, CMP with LFT's daily, Inflammatory markers ( CRP, Ferritin, D dimer)  q48h.    - Encourage self proning q2h and ambulation, incentive spirometry as tolerated  - Taper off O2 as tolerated- once tolerating room air would ideally monitor for 24h prior to discharge.  - Inpatient Contact/Airborne isolation   - Obtain advance directives         Will follow

## 2020-11-29 NOTE — PROGRESS NOTE ADULT - SUBJECTIVE AND OBJECTIVE BOX
HOSPITALIST PROGRESS NOTE    DONTA CRABTREE  [id number]  75yMale    Patient is a 75y old  Male who presents with a chief complaint of SOB (28 Nov 2020 15:00)      SUBJECTIVE:   Chart reviewed since last visit.  Patient seen and examined at bedside for AHF, CoVID pneumonia  Feels slightly less weak and less shortness of breath today  Denies any fever, chills, chest pain or dizziness        OBJECTIVE:  Vital Signs Last 24 Hrs  T(C): 37.1 (29 Nov 2020 09:48), Max: 37.1 (29 Nov 2020 09:48)  T(F): 98.8 (29 Nov 2020 09:48), Max: 98.8 (29 Nov 2020 09:48)  HR: 83 (29 Nov 2020 15:45) (69 - 93)  BP: 117/63 (29 Nov 2020 09:48) (117/63 - 123/72)   RR: 18 (29 Nov 2020 09:48) (18 - 18)  SpO2: 96% (29 Nov 2020 15:45) (93% - 97%)    PHYSICAL EXAMINATION  General: Sitting in chair - NAD  HEENT:  100%HFNC  NECK:  supple  CVS: regular rate and rhythm S1 S2  RESP:  Fair air entry bilaterally  GI:  Soft nondistended nontender BS+  : No suprapubic tenderness  MSK:  FROM, no edema  CNS:  No gross focal or global deficit noted  INTEG:  warm dry skin  PSYCH:  Fair mood    MONITOR:  CAPILLARY BLOOD GLUCOSE            I&O's Summary    28 Nov 2020 07:01  -  29 Nov 2020 07:00  --------------------------------------------------------  IN: 0 mL / OUT: 400 mL / NET: -400 mL                            13.0   13.25 )-----------( 328      ( 29 Nov 2020 11:01 )             40.0       11-29    133<L>  |  99  |  26.0<H>  ----------------------------<  129<H>  4.6   |  25.0  |  1.12    Ca    7.9<L>      29 Nov 2020 11:01    TPro  5.8<L>  /  Alb  2.6<L>  /  TBili  0.7  /  DBili  0.3  /  AST  22  /  ALT  29  /  AlkPhos  52  11-29            Culture:    TTE:    RADIOLOGY        MEDICATIONS  (STANDING):  allopurinol 300 milliGRAM(s) Oral daily  artificial tears (preservative free) Ophthalmic Solution 1 Drop(s) Both EYES two times a day  ascorbic acid 500 milliGRAM(s) Oral daily  aspirin  chewable 81 milliGRAM(s) Oral daily  atorvastatin 10 milliGRAM(s) Oral at bedtime  dexAMETHasone  Injectable 6 milliGRAM(s) IV Push daily  enoxaparin Injectable 40 milliGRAM(s) SubCutaneous every 12 hours  pantoprazole    Tablet 40 milliGRAM(s) Oral before breakfast  polyethylene glycol 3350 17 Gram(s) Oral two times a day  senna 2 Tablet(s) Oral at bedtime      MEDICATIONS  (PRN):  acetaminophen   Tablet .. 650 milliGRAM(s) Oral every 4 hours PRN Temp greater or equal to 38.5C (101.3F)  ALBUTerol    90 MICROgram(s) HFA Inhaler 2 Puff(s) Inhalation every 4 hours PRN Shortness of Breath and/or Wheezing  sodium chloride 0.65% Nasal 1 Spray(s) Both Nostrils four times a day PRN Nasal Congestion

## 2020-11-30 LAB
24R-OH-CALCIDIOL SERPL-MCNC: 18.1 NG/ML — LOW (ref 30–80)
ALBUMIN SERPL ELPH-MCNC: 2.9 G/DL — LOW (ref 3.3–5.2)
ALP SERPL-CCNC: 54 U/L — SIGNIFICANT CHANGE UP (ref 40–120)
ALT FLD-CCNC: 28 U/L — SIGNIFICANT CHANGE UP
ANION GAP SERPL CALC-SCNC: 10 MMOL/L — SIGNIFICANT CHANGE UP (ref 5–17)
AST SERPL-CCNC: 22 U/L — SIGNIFICANT CHANGE UP
BASOPHILS # BLD AUTO: 0.03 K/UL — SIGNIFICANT CHANGE UP (ref 0–0.2)
BASOPHILS NFR BLD AUTO: 0.2 % — SIGNIFICANT CHANGE UP (ref 0–2)
BILIRUB DIRECT SERPL-MCNC: 0.3 MG/DL — SIGNIFICANT CHANGE UP (ref 0–0.3)
BILIRUB INDIRECT FLD-MCNC: 0.5 MG/DL — SIGNIFICANT CHANGE UP (ref 0.2–1)
BILIRUB SERPL-MCNC: 0.7 MG/DL — SIGNIFICANT CHANGE UP (ref 0.4–2)
BUN SERPL-MCNC: 25 MG/DL — HIGH (ref 8–20)
CALCIUM SERPL-MCNC: 8.1 MG/DL — LOW (ref 8.6–10.2)
CHLORIDE SERPL-SCNC: 98 MMOL/L — SIGNIFICANT CHANGE UP (ref 98–107)
CO2 SERPL-SCNC: 25 MMOL/L — SIGNIFICANT CHANGE UP (ref 22–29)
CREAT SERPL-MCNC: 1.09 MG/DL — SIGNIFICANT CHANGE UP (ref 0.5–1.3)
CRP SERPL-MCNC: 6.07 MG/DL — HIGH (ref 0–0.4)
D DIMER BLD IA.RAPID-MCNC: 179 NG/ML DDU — SIGNIFICANT CHANGE UP
EOSINOPHIL # BLD AUTO: 0.04 K/UL — SIGNIFICANT CHANGE UP (ref 0–0.5)
EOSINOPHIL NFR BLD AUTO: 0.3 % — SIGNIFICANT CHANGE UP (ref 0–6)
FERRITIN SERPL-MCNC: 178 NG/ML — SIGNIFICANT CHANGE UP (ref 30–400)
GLUCOSE SERPL-MCNC: 90 MG/DL — SIGNIFICANT CHANGE UP (ref 70–99)
HCT VFR BLD CALC: 40.1 % — SIGNIFICANT CHANGE UP (ref 39–50)
HGB BLD-MCNC: 13 G/DL — SIGNIFICANT CHANGE UP (ref 13–17)
IMM GRANULOCYTES NFR BLD AUTO: 1.5 % — SIGNIFICANT CHANGE UP (ref 0–1.5)
LYMPHOCYTES # BLD AUTO: 0.44 K/UL — LOW (ref 1–3.3)
LYMPHOCYTES # BLD AUTO: 3.6 % — LOW (ref 13–44)
MCHC RBC-ENTMCNC: 29.5 PG — SIGNIFICANT CHANGE UP (ref 27–34)
MCHC RBC-ENTMCNC: 32.4 GM/DL — SIGNIFICANT CHANGE UP (ref 32–36)
MCV RBC AUTO: 91.1 FL — SIGNIFICANT CHANGE UP (ref 80–100)
MONOCYTES # BLD AUTO: 0.38 K/UL — SIGNIFICANT CHANGE UP (ref 0–0.9)
MONOCYTES NFR BLD AUTO: 3.1 % — SIGNIFICANT CHANGE UP (ref 2–14)
NEUTROPHILS # BLD AUTO: 11.26 K/UL — HIGH (ref 1.8–7.4)
NEUTROPHILS NFR BLD AUTO: 91.3 % — HIGH (ref 43–77)
PLATELET # BLD AUTO: 287 K/UL — SIGNIFICANT CHANGE UP (ref 150–400)
POTASSIUM SERPL-MCNC: 4.4 MMOL/L — SIGNIFICANT CHANGE UP (ref 3.5–5.3)
POTASSIUM SERPL-SCNC: 4.4 MMOL/L — SIGNIFICANT CHANGE UP (ref 3.5–5.3)
PROT SERPL-MCNC: 6 G/DL — LOW (ref 6.6–8.7)
RBC # BLD: 4.4 M/UL — SIGNIFICANT CHANGE UP (ref 4.2–5.8)
RBC # FLD: 16.6 % — HIGH (ref 10.3–14.5)
SODIUM SERPL-SCNC: 133 MMOL/L — LOW (ref 135–145)
WBC # BLD: 12.34 K/UL — HIGH (ref 3.8–10.5)
WBC # FLD AUTO: 12.34 K/UL — HIGH (ref 3.8–10.5)

## 2020-11-30 PROCEDURE — 99232 SBSQ HOSP IP/OBS MODERATE 35: CPT | Mod: CS

## 2020-11-30 PROCEDURE — 71045 X-RAY EXAM CHEST 1 VIEW: CPT | Mod: 26

## 2020-11-30 PROCEDURE — 99233 SBSQ HOSP IP/OBS HIGH 50: CPT | Mod: CS

## 2020-11-30 RX ADMIN — ENOXAPARIN SODIUM 40 MILLIGRAM(S): 100 INJECTION SUBCUTANEOUS at 16:31

## 2020-11-30 RX ADMIN — PANTOPRAZOLE SODIUM 40 MILLIGRAM(S): 20 TABLET, DELAYED RELEASE ORAL at 06:05

## 2020-11-30 RX ADMIN — Medication 500 MILLIGRAM(S): at 12:24

## 2020-11-30 RX ADMIN — Medication 6 MILLIGRAM(S): at 06:04

## 2020-11-30 RX ADMIN — Medication 300 MILLIGRAM(S): at 12:24

## 2020-11-30 RX ADMIN — Medication 1 DROP(S): at 16:31

## 2020-11-30 RX ADMIN — SENNA PLUS 2 TABLET(S): 8.6 TABLET ORAL at 21:44

## 2020-11-30 RX ADMIN — ATORVASTATIN CALCIUM 10 MILLIGRAM(S): 80 TABLET, FILM COATED ORAL at 21:44

## 2020-11-30 RX ADMIN — Medication 81 MILLIGRAM(S): at 12:24

## 2020-11-30 RX ADMIN — POLYETHYLENE GLYCOL 3350 17 GRAM(S): 17 POWDER, FOR SOLUTION ORAL at 16:31

## 2020-11-30 RX ADMIN — ENOXAPARIN SODIUM 40 MILLIGRAM(S): 100 INJECTION SUBCUTANEOUS at 06:05

## 2020-11-30 NOTE — PROGRESS NOTE ADULT - SUBJECTIVE AND OBJECTIVE BOX
[facility]  INFECTIOUS DISEASES AND INTERNAL MEDICINE at [place]  =======================================================  Pablo Villagran MD  Diplomates American Board of Internal Medicine and Infectious Diseases  Tel: [phone]      Fax: [phone]  =======================================================    BRIEN CRABTREEORD [id number]    Follow up: covid 19  seen in AM  still on hi gilbert, feels well spo2 100%    Allergies:  No Known Allergies      Medications:  acetaminophen   Tablet .. 650 milliGRAM(s) Oral every 4 hours PRN  ALBUTerol    90 MICROgram(s) HFA Inhaler 2 Puff(s) Inhalation every 4 hours PRN  allopurinol 300 milliGRAM(s) Oral daily  artificial tears (preservative free) Ophthalmic Solution 1 Drop(s) Both EYES two times a day  ascorbic acid 500 milliGRAM(s) Oral daily  aspirin  chewable 81 milliGRAM(s) Oral daily  atorvastatin 10 milliGRAM(s) Oral at bedtime  dexAMETHasone  Injectable 6 milliGRAM(s) IV Push daily  enoxaparin Injectable 40 milliGRAM(s) SubCutaneous every 12 hours  pantoprazole    Tablet 40 milliGRAM(s) Oral before breakfast  polyethylene glycol 3350 17 Gram(s) Oral two times a day  senna 2 Tablet(s) Oral at bedtime  sodium chloride 0.65% Nasal 1 Spray(s) Both Nostrils four times a day PRN            REVIEW OF SYSTEMS:  CONSTITUTIONAL:  No Fever or chills  HEENT:   No diplopia or blurred vision.  No earache, sore throat or runny nose.  CARDIOVASCULAR:  No pressure, squeezing, strangling, tightness, heaviness or aching about the chest, neck, axilla or epigastrium.  RESPIRATORY:  No cough, shortness of breath  GASTROINTESTINAL:  No nausea, vomiting or diarrhea.  GENITOURINARY:  No dysuria, frequency or urgency. No Blood in urine  MUSCULOSKELETAL:  no joint aches, no muscle pain  SKIN:  No change in skin, hair or nails.  NEUROLOGIC:  No Headaches, seizures or weakness.  PSYCHIATRIC:  No disorder of thought or mood.  ENDOCRINE:  No heat or cold intolerance  HEMATOLOGICAL:  No easy bruising or bleeding.            Physical Exam:  ICU Vital Signs Last 24 Hrs  T(C): 37.1 (30 Nov 2020 08:18), Max: 37.1 (30 Nov 2020 08:18)  T(F): 98.7 (30 Nov 2020 08:18), Max: 98.7 (30 Nov 2020 08:18)  HR: 72 (30 Nov 2020 08:18) (67 - 88)  BP: 117/76 (30 Nov 2020 08:18) (117/76 - 123/77)  BP(mean): --  ABP: --  ABP(mean): --  RR: 18 (30 Nov 2020 08:18) (18 - 18)  SpO2: 96% (30 Nov 2020 08:18) (93% - 98%)      GEN: NAD, pleasant  HEENT: normocephalic and atraumatic. EOMI. PERRL.  Anicteric   NECK: Supple.   LUNGS: Clear to auscultation.  HEART: Regular rate and rhythm without murmur.  ABDOMEN: Soft, nontender, and nondistended.  Positive bowel sounds.    : No CVA tenderness  EXTREMITIES: +edema.  MSK: no joint swelling  NEUROLOGIC: Cranial nerves II through XII are grossly intact. No focal deficits  PSYCHIATRIC: Appropriate affect .  SKIN: No Rash      Labs:                        13.0   12.34 )-----------( 287      ( 30 Nov 2020 09:12 )             40.1   11-30    133<L>  |  98  |  25.0<H>  ----------------------------<  90  4.4   |  25.0  |  1.09    Ca    8.1<L>      30 Nov 2020 09:12    TPro  6.0<L>  /  Alb  2.9<L>  /  TBili  0.7  /  DBili  0.3  /  AST  22  /  ALT  28  /  AlkPhos  54  11-30

## 2020-11-30 NOTE — PROGRESS NOTE ADULT - ASSESSMENT
74 yo  obese male from home, lives with wife, with PMHs of CAD s/p multiple stents, BEV, HLD, diverticulitis, psoriasis and gout presented with SOB. Per patient, he was tested positive for covid last Tuesday.  Was seen at urgent care and prescribed 7 days of azithromycin.. For the past 2-3 days noted to have  chills and fatigue.  Today developed SOB, hence came to ED. On admission to ED SpO2 85% on room air, COVId19 +.      COVID 19 + infection  Viral pneumonia  Acute hypoxic respiratory failure      - patient well appearing but remains on hi gilbert, taper as tolerated  - covid PCR 11/18- plasma unlikely to be beneficial  - CRP 7.12. PCT 0.1  - remdesivir completed 10 days 11/28  - decadron 6 mg daily while on remdesivir  - Avoid antibiotics unless there is a concern for a bacterial infection  - VTE prophylaxis per current [facility] COVID 19 protocol  - Check CBC with diff, CMP with LFT's daily, Inflammatory markers ( CRP, Ferritin, D dimer)  q48h.    - Encourage self proning q2h and ambulation, incentive spirometry as tolerated  - Taper off O2 as tolerated- once tolerating room air would ideally monitor for 24h prior to discharge.  - Inpatient Contact/Airborne isolation   - Obtain advance directives   - repeat CXr, may need to consider diuresis        Will follow

## 2020-11-30 NOTE — PROGRESS NOTE ADULT - ASSESSMENT
75 year old male with PMH HTN, Dyslipidemia, CAD s/p PCI, BPH, gout, obesity/BEV, Diverticulitis comes in c/o worsening SOB w/ associated chills.  Pt was found to be COVID + in the community and COVID PCR in hospital also +.  ID consulted, pt on decadron and started on Remdesivire.  Pt has had increased requirement in O2 since admission.  Now on high flow ox sat is stable over 90     #Acute hypoxic respiratory failure 2/2 COVID PNA - still requiring HFNC, now down to 50%  - Inflammatory markers improved, monitor q48h  - Encourage proning, incentive spirometry, and OOB/ambulation  - completed 10 days Remdesivir, monitor LFTs  - C/w Decadron 6mg daily  - c/w Vitamin C, Thiamine and Zinc  - MDI q4h and Tylenol for fever   - Supplemental oxygen prn, wean as tolerated.   - D-dimer is negative    #Hx of CAD  - c/w aspirin and atorvastatin     #Hx of gout  - c/w Allopurinol 300 mg po daily     #B/l LE edema  - Likely secondary to venous stasis  - Encourage ambulation and SCDs    DVT ppx:  - Enoxaparin 40 mg sc q12h given COVID status      Disposition - Pending clinical improvement; possible home vs Rehab

## 2020-11-30 NOTE — PHYSICAL THERAPY INITIAL EVALUATION ADULT - ADDITIONAL COMMENTS
per patient he does not use or own an AD, he is without steps to enter however has a full flight to the bedroom. Pt's wife can assist as needed.

## 2020-11-30 NOTE — PROGRESS NOTE ADULT - SUBJECTIVE AND OBJECTIVE BOX
HOSPITALIST PROGRESS NOTE    DONTA CRABTREE  [id number]  75yMale    Patient is a 75y old  Male who presents with a chief complaint of SOB (29 Nov 2020 15:54)      SUBJECTIVE:   Chart reviewed since last visit.  Patient seen and examined at bedside for COVID, AHRF  Feels better today  Less dyspnea - denies any chest pain, palpitations, dizziness, fever or chills      OBJECTIVE:  Vital Signs Last 24 Hrs  T(C): 37.1 (30 Nov 2020 08:18), Max: 37.1 (30 Nov 2020 08:18)  T(F): 98.7 (30 Nov 2020 08:18), Max: 98.7 (30 Nov 2020 08:18)  HR: 72 (30 Nov 2020 08:18) (67 - 88)  BP: 117/76 (30 Nov 2020 08:18) (103/67 - 123/77)   RR: 18 (30 Nov 2020 08:18) (18 - 20)  SpO2: 96% (30 Nov 2020 08:18) (93% - 100%)    PHYSICAL EXAMINATION  General: Sitting in chair - NAD  HEENT:  50%HFNC  NECK:  supple  CVS: regular rate and rhythm S1 S2  RESP:  Fair air entry bilaterally  GI:  Soft nondistended nontender BS+  : No suprapubic tenderness  MSK:  FROM, no edema  CNS:  No gross focal or global deficit noted  INTEG:  warm dry skin  PSYCH:  Fair mood    MONITOR:  CAPILLARY BLOOD GLUCOSE            I&O's Summary    30 Nov 2020 07:01  -  30 Nov 2020 13:52  --------------------------------------------------------  IN: 0 mL / OUT: 500 mL / NET: -500 mL                            13.0   12.34 )-----------( 287      ( 30 Nov 2020 09:12 )             40.1       11-30    133<L>  |  98  |  25.0<H>  ----------------------------<  90  4.4   |  25.0  |  1.09    Ca    8.1<L>      30 Nov 2020 09:12    TPro  6.0<L>  /  Alb  2.9<L>  /  TBili  0.7  /  DBili  0.3  /  AST  22  /  ALT  28  /  AlkPhos  54  11-30            Culture:    TTE:    RADIOLOGY        MEDICATIONS  (STANDING):  allopurinol 300 milliGRAM(s) Oral daily  artificial tears (preservative free) Ophthalmic Solution 1 Drop(s) Both EYES two times a day  ascorbic acid 500 milliGRAM(s) Oral daily  aspirin  chewable 81 milliGRAM(s) Oral daily  atorvastatin 10 milliGRAM(s) Oral at bedtime  dexAMETHasone  Injectable 6 milliGRAM(s) IV Push daily  enoxaparin Injectable 40 milliGRAM(s) SubCutaneous every 12 hours  pantoprazole    Tablet 40 milliGRAM(s) Oral before breakfast  polyethylene glycol 3350 17 Gram(s) Oral two times a day  senna 2 Tablet(s) Oral at bedtime      MEDICATIONS  (PRN):  acetaminophen   Tablet .. 650 milliGRAM(s) Oral every 4 hours PRN Temp greater or equal to 38.5C (101.3F)  ALBUTerol    90 MICROgram(s) HFA Inhaler 2 Puff(s) Inhalation every 4 hours PRN Shortness of Breath and/or Wheezing  sodium chloride 0.65% Nasal 1 Spray(s) Both Nostrils four times a day PRN Nasal Congestion

## 2020-12-01 LAB
BASOPHILS # BLD AUTO: 0.03 K/UL — SIGNIFICANT CHANGE UP (ref 0–0.2)
BASOPHILS NFR BLD AUTO: 0.2 % — SIGNIFICANT CHANGE UP (ref 0–2)
EOSINOPHIL # BLD AUTO: 0.03 K/UL — SIGNIFICANT CHANGE UP (ref 0–0.5)
EOSINOPHIL NFR BLD AUTO: 0.2 % — SIGNIFICANT CHANGE UP (ref 0–6)
HCT VFR BLD CALC: 38.7 % — LOW (ref 39–50)
HGB BLD-MCNC: 12.5 G/DL — LOW (ref 13–17)
IMM GRANULOCYTES NFR BLD AUTO: 1.3 % — SIGNIFICANT CHANGE UP (ref 0–1.5)
LYMPHOCYTES # BLD AUTO: 0.45 K/UL — LOW (ref 1–3.3)
LYMPHOCYTES # BLD AUTO: 3.2 % — LOW (ref 13–44)
MCHC RBC-ENTMCNC: 29.6 PG — SIGNIFICANT CHANGE UP (ref 27–34)
MCHC RBC-ENTMCNC: 32.3 GM/DL — SIGNIFICANT CHANGE UP (ref 32–36)
MCV RBC AUTO: 91.7 FL — SIGNIFICANT CHANGE UP (ref 80–100)
MONOCYTES # BLD AUTO: 0.5 K/UL — SIGNIFICANT CHANGE UP (ref 0–0.9)
MONOCYTES NFR BLD AUTO: 3.6 % — SIGNIFICANT CHANGE UP (ref 2–14)
NEUTROPHILS # BLD AUTO: 12.77 K/UL — HIGH (ref 1.8–7.4)
NEUTROPHILS NFR BLD AUTO: 91.5 % — HIGH (ref 43–77)
PLATELET # BLD AUTO: 252 K/UL — SIGNIFICANT CHANGE UP (ref 150–400)
RBC # BLD: 4.22 M/UL — SIGNIFICANT CHANGE UP (ref 4.2–5.8)
RBC # FLD: 16.8 % — HIGH (ref 10.3–14.5)
SARS-COV-2 IGG SERPL IA-ACNC: >20 RATIO — HIGH
SARS-COV-2 IGG SERPL QL IA: POSITIVE
SARS-COV-2 IGG SERPL QL IA: POSITIVE
SARS-COV-2 IGM SERPL IA-ACNC: 9.64 RATIO — HIGH
WBC # BLD: 13.96 K/UL — HIGH (ref 3.8–10.5)
WBC # FLD AUTO: 13.96 K/UL — HIGH (ref 3.8–10.5)

## 2020-12-01 PROCEDURE — 99232 SBSQ HOSP IP/OBS MODERATE 35: CPT | Mod: CS

## 2020-12-01 PROCEDURE — 99233 SBSQ HOSP IP/OBS HIGH 50: CPT | Mod: CS

## 2020-12-01 RX ORDER — CHOLECALCIFEROL (VITAMIN D3) 125 MCG
2000 CAPSULE ORAL DAILY
Refills: 0 | Status: DISCONTINUED | OUTPATIENT
Start: 2020-12-01 | End: 2020-12-04

## 2020-12-01 RX ADMIN — POLYETHYLENE GLYCOL 3350 17 GRAM(S): 17 POWDER, FOR SOLUTION ORAL at 05:27

## 2020-12-01 RX ADMIN — ATORVASTATIN CALCIUM 10 MILLIGRAM(S): 80 TABLET, FILM COATED ORAL at 22:15

## 2020-12-01 RX ADMIN — PANTOPRAZOLE SODIUM 40 MILLIGRAM(S): 20 TABLET, DELAYED RELEASE ORAL at 05:27

## 2020-12-01 RX ADMIN — ENOXAPARIN SODIUM 40 MILLIGRAM(S): 100 INJECTION SUBCUTANEOUS at 17:14

## 2020-12-01 RX ADMIN — Medication 6 MILLIGRAM(S): at 05:26

## 2020-12-01 RX ADMIN — ENOXAPARIN SODIUM 40 MILLIGRAM(S): 100 INJECTION SUBCUTANEOUS at 05:27

## 2020-12-01 RX ADMIN — Medication 2000 UNIT(S): at 12:48

## 2020-12-01 NOTE — PROGRESS NOTE ADULT - ASSESSMENT
74 yo  obese male from home, lives with wife, with PMHs of CAD s/p multiple stents, BEV, HLD, diverticulitis, psoriasis and gout presented with SOB. Per patient, he was tested positive for covid last Tuesday.  Was seen at urgent care and prescribed 7 days of azithromycin.. For the past 2-3 days noted to have  chills and fatigue.  Today developed SOB, hence came to ED. On admission to ED SpO2 85% on room air, COVId19 +.      COVID 19 + infection  Viral pneumonia  Acute hypoxic respiratory failure  Pneumomediastinum      - patient improving, tapered to nasal canula  - CXr with pneumomediastinum, consider CT chest and Ct sx eval  - covid PCR 11/18- plasma unlikely to be beneficial  - CRP 7.12. PCT 0.1  - remdesivir completed 10 days 11/28  - dc decadron  - Avoid antibiotics unless there is a concern for a bacterial infection  - VTE prophylaxis per current [facility] COVID 19 protocol  - Check CBC with diff, CMP with LFT's daily, Inflammatory markers ( CRP, Ferritin, D dimer)  q48h.    - Encourage self proning q2h and ambulation, incentive spirometry as tolerated  - Taper off O2 as tolerated- once tolerating room air would ideally monitor for 24h prior to discharge.  - Inpatient Contact/Airborne isolation         d/w Dr Iniguez  Will follow

## 2020-12-01 NOTE — PROGRESS NOTE ADULT - SUBJECTIVE AND OBJECTIVE BOX
HOSPITALIST PROGRESS NOTE    DONTA CRABTREE  [id number]  75yMale    Patient is a 75y old  Male who presents with a chief complaint of SOB (30 Nov 2020 16:53)      SUBJECTIVE:   Chart reviewed since last visit.  Patient seen and examined at bedside for AHRF, COVID-19 Pneumonia    Feels better every day - denies any shortness of breath, chest pain, fever  Denies any nausea, vomiting or abdominal pain. Had BM        OBJECTIVE:  Vital Signs Last 24 Hrs  T(C): 36.9 (01 Dec 2020 08:03), Max: 37.1 (30 Nov 2020 21:48)  T(F): 98.4 (01 Dec 2020 08:03), Max: 98.8 (30 Nov 2020 21:48)  HR: 61 (01 Dec 2020 08:03) (61 - 101)  BP: 116/76 (01 Dec 2020 08:03) (110/70 - 123/78)   RR: 18 (01 Dec 2020 08:03) (18 - 19)  SpO2: 96% (01 Dec 2020 05:10) (95% - 96%)    PHYSICAL EXAMINATION  General: Sitting in chair - NAD  HEENT:  5LNC  NECK:  supple  CVS: regular rate and rhythm S1 S2  RESP:  Fair air entry bilaterally  GI:  Soft nondistended nontender BS+  : No suprapubic tenderness  MSK:  FROM, no edema  CNS:  No gross focal or global deficit noted  INTEG:  warm dry skin  PSYCH:  Fair mood      MONITOR:  CAPILLARY BLOOD GLUCOSE            I&O's Summary    30 Nov 2020 07:01  -  01 Dec 2020 07:00  --------------------------------------------------------  IN: 200 mL / OUT: 1150 mL / NET: -950 mL                                      12.5   13.96 )-----------( 252      ( 01 Dec 2020 09:07 )             38.7       12-01    135  |  100  |  28.0<H>  ----------------------------<  98  4.5   |  25.0  |  1.15    Ca    8.2<L>      01 Dec 2020 09:07    TPro  5.7<L>  /  Alb  2.8<L>  /  TBili  0.6  /  DBili  0.2  /  AST  23  /  ALT  29  /  AlkPhos  51  12-01            Culture:    TTE:    RADIOLOGY        MEDICATIONS  (STANDING):  allopurinol 300 milliGRAM(s) Oral daily  artificial tears (preservative free) Ophthalmic Solution 1 Drop(s) Both EYES two times a day  ascorbic acid 500 milliGRAM(s) Oral daily  aspirin  chewable 81 milliGRAM(s) Oral daily  atorvastatin 10 milliGRAM(s) Oral at bedtime  dexAMETHasone  Injectable 6 milliGRAM(s) IV Push daily  enoxaparin Injectable 40 milliGRAM(s) SubCutaneous every 12 hours  pantoprazole    Tablet 40 milliGRAM(s) Oral before breakfast  polyethylene glycol 3350 17 Gram(s) Oral two times a day  senna 2 Tablet(s) Oral at bedtime      MEDICATIONS  (PRN):  acetaminophen   Tablet .. 650 milliGRAM(s) Oral every 4 hours PRN Temp greater or equal to 38.5C (101.3F)  ALBUTerol    90 MICROgram(s) HFA Inhaler 2 Puff(s) Inhalation every 4 hours PRN Shortness of Breath and/or Wheezing  sodium chloride 0.65% Nasal 1 Spray(s) Both Nostrils four times a day PRN Nasal Congestion

## 2020-12-01 NOTE — PROGRESS NOTE ADULT - SUBJECTIVE AND OBJECTIVE BOX
[facility]  INFECTIOUS DISEASES AND INTERNAL MEDICINE at [place]  =======================================================  Pablo Villagran MD  Diplomates American Board of Internal Medicine and Infectious Diseases  Tel: [phone]      Fax: [phone]  =======================================================    DONTA CRABTREE [id number]    Follow up: covid19  improved  now on 5 l o2  feels much better    Allergies:  No Known Allergies      Medications:  acetaminophen   Tablet .. 650 milliGRAM(s) Oral every 4 hours PRN  ALBUTerol    90 MICROgram(s) HFA Inhaler 2 Puff(s) Inhalation every 4 hours PRN  allopurinol 300 milliGRAM(s) Oral daily  artificial tears (preservative free) Ophthalmic Solution 1 Drop(s) Both EYES two times a day  ascorbic acid 500 milliGRAM(s) Oral daily  aspirin  chewable 81 milliGRAM(s) Oral daily  atorvastatin 10 milliGRAM(s) Oral at bedtime  cholecalciferol 2000 Unit(s) Oral daily  dexAMETHasone  Injectable 6 milliGRAM(s) IV Push daily  enoxaparin Injectable 40 milliGRAM(s) SubCutaneous every 12 hours  pantoprazole    Tablet 40 milliGRAM(s) Oral before breakfast  polyethylene glycol 3350 17 Gram(s) Oral two times a day  senna 2 Tablet(s) Oral at bedtime  sodium chloride 0.65% Nasal 1 Spray(s) Both Nostrils four times a day PRN            REVIEW OF SYSTEMS:  CONSTITUTIONAL:  No Fever or chills  HEENT:   No diplopia or blurred vision.  No earache, sore throat or runny nose.  CARDIOVASCULAR:  No pressure, squeezing, strangling, tightness, heaviness or aching about the chest, neck, axilla or epigastrium.  RESPIRATORY:  No cough, shortness of breath  GASTROINTESTINAL:  No nausea, vomiting or diarrhea.  GENITOURINARY:  No dysuria, frequency or urgency. No Blood in urine  MUSCULOSKELETAL:  no joint aches, no muscle pain  SKIN:  No change in skin, hair or nails.  NEUROLOGIC:  No Headaches, seizures or weakness.  PSYCHIATRIC:  No disorder of thought or mood.  ENDOCRINE:  No heat or cold intolerance  HEMATOLOGICAL:  No easy bruising or bleeding.            Physical Exam:  ICU Vital Signs Last 24 Hrs  T(C): 36.9 (01 Dec 2020 08:03), Max: 37.1 (30 Nov 2020 21:48)  T(F): 98.4 (01 Dec 2020 08:03), Max: 98.8 (30 Nov 2020 21:48)  HR: 61 (01 Dec 2020 08:03) (61 - 101)  BP: 116/76 (01 Dec 2020 08:03) (110/70 - 123/78)  BP(mean): --  ABP: --  ABP(mean): --  RR: 18 (01 Dec 2020 08:03) (18 - 19)  SpO2: 96% (01 Dec 2020 05:10) (95% - 96%)      GEN: NAD, pleasant sitting up in chair  HEENT: normocephalic and atraumatic. EOMI. PERRL.  Anicteric   NECK: Supple.   LUNGS: Clear to auscultation.  HEART: Regular rate and rhythm without murmur.  ABDOMEN: Soft, nontender, and nondistended.  Positive bowel sounds.    : No CVA tenderness  EXTREMITIES: + LE edema.  MSK: no joint swelling  NEUROLOGIC: Cranial nerves II through XII are grossly intact. No focal deficits  PSYCHIATRIC: Appropriate affect .  SKIN: No Rash      Labs:                        12.5   13.96 )-----------( 252      ( 01 Dec 2020 09:07 )             38.7   12-01    135  |  100  |  28.0<H>  ----------------------------<  98  4.5   |  25.0  |  1.15    Ca    8.2<L>      01 Dec 2020 09:07    TPro  5.7<L>  /  Alb  2.8<L>  /  TBili  0.6  /  DBili  0.2  /  AST  23  /  ALT  29  /  AlkPhos  51  12-01

## 2020-12-01 NOTE — PROGRESS NOTE ADULT - ASSESSMENT
75 year old male with PMH HTN, Dyslipidemia, CAD s/p PCI, BPH, gout, obesity/BEV, Diverticulitis comes in c/o worsening SOB w/ associated chills.  Pt was found to be COVID + in the community and COVID PCR in hospital also +.  ID consulted, pt on decadron and started on Remdesivire.  Pt has had increased requirement in O2 since admission.  Now on high flow ox sat is stable over 90     #Acute hypoxic respiratory failure 2/2 COVID PNA - interval improvement from 50%HFNC to 5LNC. Inflammatory markers improved, monitor q48h  - Supplemental oxygen prn, wean as tolerated.   - MDI q4h and Tylenol for fever   - C/w Decadron 6mg daily  - Encourage proning, incentive spirometry, and OOB/ambulation  - completed 10 days Remdesivir, monitor LFTs  - c/w Vitamin C, Thiamine and Zinc  - Add Vitamin D for deficiency        #Hx of CAD  - c/w aspirin and atorvastatin     #Hx of gout  - c/w Allopurinol 300 mg po daily     #B/l LE edema  - Likely secondary to venous stasis  - Encourage ambulation and SCDs    DVT ppx:  - Enoxaparin 40 mg sc q12h given COVID status      Disposition - Pending clinical improvement; possible home vs Rehab (48 hours - needs evaluation for home O2) 75 year old male with PMH HTN, Dyslipidemia, CAD s/p PCI, BPH, gout, obesity/BEV, Diverticulitis comes in c/o worsening SOB w/ associated chills.  Pt was found to be COVID + in the community and COVID PCR in hospital also +.  ID consulted, pt on decadron and started on Remdesivire.  Pt has had increased requirement in O2 since admission.  Now on high flow ox sat is stable over 90     #Acute hypoxic respiratory failure 2/2 COVID PNA - interval improvement from 50%HFNC to 5LNC. Inflammatory markers improved, monitor q48h  - Supplemental oxygen prn, wean as tolerated.   - MDI q4h and Tylenol for fever   - C/w Decadron 6mg daily  - Encourage proning, incentive spirometry, and OOB/ambulation  - completed 10 days Remdesivir, monitor LFTs  - c/w Vitamin C, Thiamine and Zinc  - Add Vitamin D for deficiency        #Hx of CAD  - c/w aspirin and atorvastatin     #Hx of gout  - c/w Allopurinol 300 mg po daily     #B/l LE edema  - Likely secondary to venous stasis  - Encourage ambulation and SCDs    DVT ppx:  - Enoxaparin 40 mg sc q12h given COVID status      Disposition - Pending clinical improvement; possible home in 48 hours - needs evaluation for home O2)

## 2020-12-02 LAB
ALBUMIN SERPL ELPH-MCNC: 2.7 G/DL — LOW (ref 3.3–5.2)
ALP SERPL-CCNC: 57 U/L — SIGNIFICANT CHANGE UP (ref 40–120)
ALT FLD-CCNC: 31 U/L — SIGNIFICANT CHANGE UP
ANION GAP SERPL CALC-SCNC: 11 MMOL/L — SIGNIFICANT CHANGE UP (ref 5–17)
AST SERPL-CCNC: 24 U/L — SIGNIFICANT CHANGE UP
BASOPHILS # BLD AUTO: 0.06 K/UL — SIGNIFICANT CHANGE UP (ref 0–0.2)
BASOPHILS NFR BLD AUTO: 0.5 % — SIGNIFICANT CHANGE UP (ref 0–2)
BILIRUB DIRECT SERPL-MCNC: 0.2 MG/DL — SIGNIFICANT CHANGE UP (ref 0–0.3)
BILIRUB INDIRECT FLD-MCNC: 0.5 MG/DL — SIGNIFICANT CHANGE UP (ref 0.2–1)
BILIRUB SERPL-MCNC: 0.7 MG/DL — SIGNIFICANT CHANGE UP (ref 0.4–2)
BUN SERPL-MCNC: 25 MG/DL — HIGH (ref 8–20)
CALCIUM SERPL-MCNC: 8.4 MG/DL — LOW (ref 8.6–10.2)
CHLORIDE SERPL-SCNC: 101 MMOL/L — SIGNIFICANT CHANGE UP (ref 98–107)
CO2 SERPL-SCNC: 25 MMOL/L — SIGNIFICANT CHANGE UP (ref 22–29)
CREAT SERPL-MCNC: 1.23 MG/DL — SIGNIFICANT CHANGE UP (ref 0.5–1.3)
CRP SERPL-MCNC: 2.16 MG/DL — HIGH (ref 0–0.4)
D DIMER BLD IA.RAPID-MCNC: 150 NG/ML DDU — SIGNIFICANT CHANGE UP
EOSINOPHIL # BLD AUTO: 0.07 K/UL — SIGNIFICANT CHANGE UP (ref 0–0.5)
EOSINOPHIL NFR BLD AUTO: 0.6 % — SIGNIFICANT CHANGE UP (ref 0–6)
FERRITIN SERPL-MCNC: 165 NG/ML — SIGNIFICANT CHANGE UP (ref 30–400)
GLUCOSE SERPL-MCNC: 87 MG/DL — SIGNIFICANT CHANGE UP (ref 70–99)
HCT VFR BLD CALC: 42.2 % — SIGNIFICANT CHANGE UP (ref 39–50)
HGB BLD-MCNC: 13.6 G/DL — SIGNIFICANT CHANGE UP (ref 13–17)
IMM GRANULOCYTES NFR BLD AUTO: 1.7 % — HIGH (ref 0–1.5)
LYMPHOCYTES # BLD AUTO: 0.79 K/UL — LOW (ref 1–3.3)
LYMPHOCYTES # BLD AUTO: 7 % — LOW (ref 13–44)
MCHC RBC-ENTMCNC: 30.2 PG — SIGNIFICANT CHANGE UP (ref 27–34)
MCHC RBC-ENTMCNC: 32.2 GM/DL — SIGNIFICANT CHANGE UP (ref 32–36)
MCV RBC AUTO: 93.6 FL — SIGNIFICANT CHANGE UP (ref 80–100)
MONOCYTES # BLD AUTO: 0.44 K/UL — SIGNIFICANT CHANGE UP (ref 0–0.9)
MONOCYTES NFR BLD AUTO: 3.9 % — SIGNIFICANT CHANGE UP (ref 2–14)
NEUTROPHILS # BLD AUTO: 9.77 K/UL — HIGH (ref 1.8–7.4)
NEUTROPHILS NFR BLD AUTO: 86.3 % — HIGH (ref 43–77)
PLATELET # BLD AUTO: 212 K/UL — SIGNIFICANT CHANGE UP (ref 150–400)
POTASSIUM SERPL-MCNC: 4.7 MMOL/L — SIGNIFICANT CHANGE UP (ref 3.5–5.3)
POTASSIUM SERPL-SCNC: 4.7 MMOL/L — SIGNIFICANT CHANGE UP (ref 3.5–5.3)
PROCALCITONIN SERPL-MCNC: 0.06 NG/ML — SIGNIFICANT CHANGE UP (ref 0.02–0.1)
PROT SERPL-MCNC: 6 G/DL — LOW (ref 6.6–8.7)
RBC # BLD: 4.51 M/UL — SIGNIFICANT CHANGE UP (ref 4.2–5.8)
RBC # FLD: 17.2 % — HIGH (ref 10.3–14.5)
SODIUM SERPL-SCNC: 137 MMOL/L — SIGNIFICANT CHANGE UP (ref 135–145)
WBC # BLD: 11.32 K/UL — HIGH (ref 3.8–10.5)
WBC # FLD AUTO: 11.32 K/UL — HIGH (ref 3.8–10.5)

## 2020-12-02 PROCEDURE — 99232 SBSQ HOSP IP/OBS MODERATE 35: CPT | Mod: CS

## 2020-12-02 RX ADMIN — ENOXAPARIN SODIUM 40 MILLIGRAM(S): 100 INJECTION SUBCUTANEOUS at 17:03

## 2020-12-02 RX ADMIN — Medication 300 MILLIGRAM(S): at 22:14

## 2020-12-02 RX ADMIN — ENOXAPARIN SODIUM 40 MILLIGRAM(S): 100 INJECTION SUBCUTANEOUS at 05:35

## 2020-12-02 RX ADMIN — PANTOPRAZOLE SODIUM 40 MILLIGRAM(S): 20 TABLET, DELAYED RELEASE ORAL at 05:35

## 2020-12-02 RX ADMIN — Medication 81 MILLIGRAM(S): at 17:03

## 2020-12-02 RX ADMIN — Medication 6 MILLIGRAM(S): at 05:35

## 2020-12-02 RX ADMIN — ATORVASTATIN CALCIUM 10 MILLIGRAM(S): 80 TABLET, FILM COATED ORAL at 22:14

## 2020-12-02 RX ADMIN — Medication 1 DROP(S): at 05:38

## 2020-12-02 RX ADMIN — Medication 2000 UNIT(S): at 17:03

## 2020-12-02 RX ADMIN — Medication 500 MILLIGRAM(S): at 17:03

## 2020-12-02 NOTE — PROGRESS NOTE ADULT - ASSESSMENT
76 yo  obese male from home, lives with wife, with PMHs of CAD s/p multiple stents, BEV, HLD, diverticulitis, psoriasis and gout presented with SOB. Per patient, he was tested positive for covid last Tuesday.  Was seen at urgent care and prescribed 7 days of azithromycin.. For the past 2-3 days noted to have  chills and fatigue.  Today developed SOB, hence came to ED. On admission to ED SpO2 85% on room air, COVId19 +.      COVID 19 + infection  Viral pneumonia  Acute hypoxic respiratory failure  Pneumomediastinum      - patient improving, tapered to nasal canula  - CXr with pneumomediastinum, awaiting Ct chest  - covid PCR 11/18- plasma unlikely to be beneficial  - CRP 7.12. PCT 0.1  - remdesivir completed 10 days 11/28  - dc decadron  - Avoid antibiotics unless there is a concern for a bacterial infection  - VTE prophylaxis per current [facility] COVID 19 protocol  - Check CBC with diff, CMP with LFT's daily, Inflammatory markers ( CRP, Ferritin, D dimer)  q48h.    - Encourage self proning q2h and ambulation, incentive spirometry as tolerated  - Taper off O2 as tolerated- will likely need home O2 and outpatient pulm eval  - Inpatient Contact/Airborne isolation         d/w Dr Iniguez  signing off

## 2020-12-02 NOTE — PROGRESS NOTE ADULT - SUBJECTIVE AND OBJECTIVE BOX
HOSPITALIST PROGRESS NOTE    DONTA CRABTREE  [id number]  75yMale    Patient is a 75y old  Male who presents with a chief complaint of SOB (02 Dec 2020 18:04)      SUBJECTIVE:   Chart reviewed since last visit.  Patient seen and examined at bedside for COVID-19 pneumonia, AHRF, pneumomediastinum on CXR  Continues to feel better - no dyspnea at rest at time of evaluation.  Denies any chest pain, palpitation, fever or chills  Cough with Incentive spirometer      OBJECTIVE:  Vital Signs Last 24 Hrs  T(C): 36.3 (02 Dec 2020 16:35), Max: 36.8 (02 Dec 2020 08:24)  T(F): 97.3 (02 Dec 2020 16:35), Max: 98.3 (02 Dec 2020 08:24)  HR: 90 (02 Dec 2020 16:35) (60 - 100)  BP: 107/70 (02 Dec 2020 16:35) (107/70 - 127/73)   RR: 19 (02 Dec 2020 16:35) (18 - 20)  SpO2: 91% (02 Dec 2020 16:35) (91% - 98%)    PHYSICAL EXAMINATION  General: Sitting in chair - NAD  HEENT:  5LNC  NECK:  supple  CVS: regular rate and rhythm S1 S2  RESP:  Fair air entry bilaterally  GI:  Soft nondistended nontender BS+  : No suprapubic tenderness  MSK:  FROM, trace bilateral LE edema  CNS:  No gross focal or global deficit noted  INTEG:  warm dry skin  PSYCH:  Fair mood    MONITOR:  CAPILLARY BLOOD GLUCOSE            I&O's Summary    01 Dec 2020 07:01  -  02 Dec 2020 07:00  --------------------------------------------------------  IN: 1040 mL / OUT: 800 mL / NET: 240 mL                            13.6   11.32 )-----------( 212      ( 02 Dec 2020 08:28 )             42.2       12-02    137  |  101  |  25.0<H>  ----------------------------<  87  4.7   |  25.0  |  1.23    Ca    8.4<L>      02 Dec 2020 08:28    TPro  6.0<L>  /  Alb  2.7<L>  /  TBili  0.7  /  DBili  0.2  /  AST  24  /  ALT  31  /  AlkPhos  57  12-02        C-Reactive Protein, Serum: 2.16 mg/dL (12.02.20 @ 08:28)   C-Reactive Protein, Serum: 6.07 mg/dL (11.30.20 @ 09:12)   C-Reactive Protein, Serum: 7.12 mg/dL (11.28.20 @ 10:01)   C-Reactive Protein, Serum: 7.89 mg/dL (11.26.20 @ 11:09)   C-Reactive Protein, Serum: 6.89 mg/dL (11.25.20 @ 18:26)   C-Reactive Protein, Serum: 5.31 mg/dL (11.24.20 @ 07:42)   C-Reactive Protein, Serum: 7.11 mg/dL (11.22.20 @ 08:37) Ferritin, Serum: 165 ng/mL (12.02.20 @ 08:28)   Ferritin, Serum: 178 ng/mL (11.30.20 @ 09:12)   Ferritin, Serum: 197 ng/mL (11.28.20 @ 10:01)     D-Dimer Assay, Quantitative: 150 ng/mL DDU (12.02.20 @ 08:28)   D-Dimer Assay, Quantitative: 179 ng/mL DDU (11.30.20 @ 09:12)   D-Dimer Assay, Quantitative: 157 ng/mL DDU (11.28.20 @ 10:01)   D-Dimer Assay, Quantitative: 177 ng/mL DDU (11.26.20 @ 11:09)   D-Dimer Assay, Quantitative: 190 ng/mL DDU (11.25.20 @ 19:38)         Culture:    TTE:    RADIOLOGY        MEDICATIONS  (STANDING):  allopurinol 300 milliGRAM(s) Oral daily  artificial tears (preservative free) Ophthalmic Solution 1 Drop(s) Both EYES two times a day  ascorbic acid 500 milliGRAM(s) Oral daily  aspirin  chewable 81 milliGRAM(s) Oral daily  atorvastatin 10 milliGRAM(s) Oral at bedtime  cholecalciferol 2000 Unit(s) Oral daily  dexAMETHasone  Injectable 6 milliGRAM(s) IV Push daily  enoxaparin Injectable 40 milliGRAM(s) SubCutaneous every 12 hours  pantoprazole    Tablet 40 milliGRAM(s) Oral before breakfast  polyethylene glycol 3350 17 Gram(s) Oral two times a day  senna 2 Tablet(s) Oral at bedtime      MEDICATIONS  (PRN):  acetaminophen   Tablet .. 650 milliGRAM(s) Oral every 4 hours PRN Temp greater or equal to 38.5C (101.3F)  ALBUTerol    90 MICROgram(s) HFA Inhaler 2 Puff(s) Inhalation every 4 hours PRN Shortness of Breath and/or Wheezing  sodium chloride 0.65% Nasal 1 Spray(s) Both Nostrils four times a day PRN Nasal Congestion

## 2020-12-02 NOTE — PROGRESS NOTE ADULT - ASSESSMENT
75 year old male with PMH HTN, Dyslipidemia, CAD s/p PCI, BPH, gout, obesity/BEV, Diverticulitis comes in c/o worsening SOB w/ associated chills.  Pt was found to be COVID + in the community and COVID PCR in hospital also +.  ID consulted, pt on decadron and started on Remdesivire.  Pt has had increased requirement in O2 since admission.  Now on high flow ox sat is stable over 90     #Acute hypoxic respiratory failure 2/2 COVID PNA - Continues to require 5LNC. Inflammatory markers improved, monitor q48h  - Supplemental oxygen prn, wean as tolerated.   - MDI q4h and Tylenol for fever   - C/w Decadron 6mg daily  - Encourage proning, incentive spirometry, and OOB/ambulation  - completed 10 days Remdesivir, monitor LFTs  - c/w Vitamin C, Thiamine and Zinc  -  Vitamin D for deficiency    Pneumomediastinum on Chest X-Ray; likley secondary to Barotrauma from HFNC - although patient with clinical improvement and not deterioration  - Awaiting CT chest    #Hx of CAD  - c/w aspirin and atorvastatin     #Hx of gout  - c/w Allopurinol 300 mg po daily     #B/l LE edema  - Likely secondary to venous stasis  - Encourage ambulation and SCDs    DVT ppx:  - Enoxaparin 40 mg sc q12h given COVID status      Disposition - Pending clinical improvement; possible home in 48 hours - needs evaluation for home O2)

## 2020-12-02 NOTE — PROGRESS NOTE ADULT - SUBJECTIVE AND OBJECTIVE BOX
[facility]  INFECTIOUS DISEASES AND INTERNAL MEDICINE at [place]  =======================================================  Pablo Villagran MD  Diplomates American Board of Internal Medicine and Infectious Diseases  Tel: [phone]      Fax: [phone]  =======================================================    DONTA CRABTREE [id number]    Follow up: covid19  improved  now on 4 l o2  feels much better    Allergies:  No Known Allergies      Medications:  acetaminophen   Tablet .. 650 milliGRAM(s) Oral every 4 hours PRN  ALBUTerol    90 MICROgram(s) HFA Inhaler 2 Puff(s) Inhalation every 4 hours PRN  allopurinol 300 milliGRAM(s) Oral daily  artificial tears (preservative free) Ophthalmic Solution 1 Drop(s) Both EYES two times a day  ascorbic acid 500 milliGRAM(s) Oral daily  aspirin  chewable 81 milliGRAM(s) Oral daily  atorvastatin 10 milliGRAM(s) Oral at bedtime  cholecalciferol 2000 Unit(s) Oral daily  dexAMETHasone  Injectable 6 milliGRAM(s) IV Push daily  enoxaparin Injectable 40 milliGRAM(s) SubCutaneous every 12 hours  pantoprazole    Tablet 40 milliGRAM(s) Oral before breakfast  polyethylene glycol 3350 17 Gram(s) Oral two times a day  senna 2 Tablet(s) Oral at bedtime  sodium chloride 0.65% Nasal 1 Spray(s) Both Nostrils four times a day PRN            REVIEW OF SYSTEMS:  CONSTITUTIONAL:  No Fever or chills  HEENT:   No diplopia or blurred vision.  No earache, sore throat or runny nose.  CARDIOVASCULAR:  No pressure, squeezing, strangling, tightness, heaviness or aching about the chest, neck, axilla or epigastrium.  RESPIRATORY:  No cough, shortness of breath  GASTROINTESTINAL:  No nausea, vomiting or diarrhea.  GENITOURINARY:  No dysuria, frequency or urgency. No Blood in urine  MUSCULOSKELETAL:  no joint aches, no muscle pain  SKIN:  No change in skin, hair or nails.  NEUROLOGIC:  No Headaches, seizures or weakness.  PSYCHIATRIC:  No disorder of thought or mood.  ENDOCRINE:  No heat or cold intolerance  HEMATOLOGICAL:  No easy bruising or bleeding.            Physical Exam:  ICU Vital Signs Last 24 Hrs  T(C): 36.9 (01 Dec 2020 08:03), Max: 37.1 (30 Nov 2020 21:48)  T(F): 98.4 (01 Dec 2020 08:03), Max: 98.8 (30 Nov 2020 21:48)  HR: 61 (01 Dec 2020 08:03) (61 - 101)  BP: 116/76 (01 Dec 2020 08:03) (110/70 - 123/78)  BP(mean): --  ABP: --  ABP(mean): --  RR: 18 (01 Dec 2020 08:03) (18 - 19)  SpO2: 96% (01 Dec 2020 05:10) (95% - 96%)      GEN: NAD, pleasant sitting up in chair  HEENT: normocephalic and atraumatic. EOMI. PERRL.  Anicteric   NECK: Supple.   LUNGS: Clear to auscultation.  HEART: Regular rate and rhythm without murmur.  ABDOMEN: Soft, nontender, and nondistended.  Positive bowel sounds.    : No CVA tenderness  EXTREMITIES: + LE edema.  MSK: no joint swelling  NEUROLOGIC: Cranial nerves II through XII are grossly intact. No focal deficits  PSYCHIATRIC: Appropriate affect .  SKIN: No Rash      Labs:                                    13.6   11.32 )-----------( 212      ( 02 Dec 2020 08:28 )             42.2   12-02    137  |  101  |  25.0<H>  ----------------------------<  87  4.7   |  25.0  |  1.23    Ca    8.4<L>      02 Dec 2020 08:28    TPro  6.0<L>  /  Alb  2.7<L>  /  TBili  0.7  /  DBili  0.2  /  AST  24  /  ALT  31  /  AlkPhos  57  12-02    < from: Xray Chest 1 View- PORTABLE-Urgent (Xray Chest 1 View- PORTABLE-Urgent .) (11.30.20 @ 17:50) >  FINDINGS:  The lungs show increasing/LEFT perihilar lower lobe airspace disease. RIGHT lung clear.  There is a pneumomediastinum with an air dissecting into the lowerneck soft tissues.    There is cardiomegaly. No hilar mass.    Visualized osseous structures are intact.        IMPRESSION:   Pneumomediastinum. Cardiomegaly..  Increasing LEFT perihilar basilar airspace disease.      < end of copied text >

## 2020-12-03 LAB
ALBUMIN SERPL ELPH-MCNC: 2.3 G/DL — LOW (ref 3.3–5.2)
ALP SERPL-CCNC: 51 U/L — SIGNIFICANT CHANGE UP (ref 40–120)
ALT FLD-CCNC: 26 U/L — SIGNIFICANT CHANGE UP
AST SERPL-CCNC: 28 U/L — SIGNIFICANT CHANGE UP
BILIRUB DIRECT SERPL-MCNC: 0.1 MG/DL — SIGNIFICANT CHANGE UP (ref 0–0.3)
BILIRUB INDIRECT FLD-MCNC: 0.6 MG/DL — SIGNIFICANT CHANGE UP (ref 0.2–1)
BILIRUB SERPL-MCNC: 0.7 MG/DL — SIGNIFICANT CHANGE UP (ref 0.4–2)
CREAT SERPL-MCNC: 1 MG/DL — SIGNIFICANT CHANGE UP (ref 0.5–1.3)
PROT SERPL-MCNC: 5.6 G/DL — LOW (ref 6.6–8.7)

## 2020-12-03 PROCEDURE — 71250 CT THORAX DX C-: CPT | Mod: 26

## 2020-12-03 PROCEDURE — 99232 SBSQ HOSP IP/OBS MODERATE 35: CPT | Mod: CS

## 2020-12-03 RX ADMIN — ENOXAPARIN SODIUM 40 MILLIGRAM(S): 100 INJECTION SUBCUTANEOUS at 05:33

## 2020-12-03 RX ADMIN — Medication 500 MILLIGRAM(S): at 12:44

## 2020-12-03 RX ADMIN — SENNA PLUS 2 TABLET(S): 8.6 TABLET ORAL at 22:28

## 2020-12-03 RX ADMIN — ENOXAPARIN SODIUM 40 MILLIGRAM(S): 100 INJECTION SUBCUTANEOUS at 17:16

## 2020-12-03 RX ADMIN — Medication 2000 UNIT(S): at 12:44

## 2020-12-03 RX ADMIN — Medication 300 MILLIGRAM(S): at 12:45

## 2020-12-03 RX ADMIN — PANTOPRAZOLE SODIUM 40 MILLIGRAM(S): 20 TABLET, DELAYED RELEASE ORAL at 05:33

## 2020-12-03 RX ADMIN — Medication 81 MILLIGRAM(S): at 12:44

## 2020-12-03 RX ADMIN — Medication 6 MILLIGRAM(S): at 05:33

## 2020-12-03 RX ADMIN — ATORVASTATIN CALCIUM 10 MILLIGRAM(S): 80 TABLET, FILM COATED ORAL at 22:27

## 2020-12-03 RX ADMIN — Medication 1 DROP(S): at 17:16

## 2020-12-03 RX ADMIN — Medication 1 DROP(S): at 05:35

## 2020-12-03 NOTE — PROGRESS NOTE ADULT - ASSESSMENT
75 year old male with PMH HTN, Dyslipidemia, CAD s/p PCI, BPH, gout, obesity/BEV, Diverticulitis comes in c/o worsening SOB w/ associated chills.  Pt was found to be COVID + in the community and COVID PCR in hospital also +.  ID consulted, pt on decadron and started on Remdesivire.  Pt has had increased requirement in O2 since admission.  Now on high flow ox sat is stable over 90     #Acute hypoxic respiratory failure 2/2 COVID PNA - Continues to require 5LNC.     - Supplemental oxygen prn, wean as tolerated.   - MDI q4h and Tylenol for fever   - C/w Decadron 6mg daily  - Encourage proning, incentive spirometry, and OOB/ambulation  - completed 10 days Remdesivir, monitor LFTs  - c/w Vitamin C, Thiamine and Zinc  -  Vitamin D for deficiency  - Inflammatory markers improved, monitor q48h  - CT chest      Pneumomediastinum on Chest X-Ray; likely secondary to Barotrauma from HFNC - although patient with clinical improvement and not deterioration  - Awaiting CT chest    #Hx of CAD  - c/w aspirin and atorvastatin     #Hx of gout  - c/w Allopurinol 300 mg po daily     #B/l LE edema  - Likely secondary to venous stasis  - Encourage ambulation and SCDs    DVT ppx:  - Enoxaparin 40 mg sc q12h given COVID status      Disposition - Pending clinical improvement; possible home in 48 hours - needs evaluation for home O2)

## 2020-12-03 NOTE — CONSULT NOTE ADULT - SUBJECTIVE AND OBJECTIVE BOX
Surgeon:  Lesia    Consult requesting by: Geetha     HISTORY OF PRESENT ILLNESS:  74 yo  obese male from home, lives with wife, with PMHs of CAD s/p multiple stents, BEV, HLD, diverticulitis, psoriasis and gout presented with SOB. Per patient, he was tested positive for covid last Tuesday.  Was seen at urgent care and prescribed 7 days of azithromycin.. For the past 2-3 days noted to have  chills and fatigue.  Today developed SOB, hence came to ED. On admission to ED SpO2 85% on room air. Denies headache, dizziness, chest pain, palpitations, n/v, abdominal pain, change in urination or bowel habits.   Patients multiple family members were tested positive after having a recent  birthday party. (H&P)    Pt seen at bedside.  Sitting in the chair.  on 5 L NC ,  Noted pneumomediastinum on CT scan.      < from: CT Chest No Cont (12.03.20 @ 14:20) >  LUNGS AND AIRWAYS: Patent central airways.  Bilateral lung opacities including groundglass opacities with more dense consolidations in the lung bases compatible with multifocal infection/inflammation.  PLEURA: No pleural effusion.  MEDIASTINUM AND FIGUEROA: There is a large amount of pneumomediastinum. No enlarged lymph nodes.  VESSELS: Thoracic aorta normal in caliber with mild calcified plaque. Coronary artery calcifications.  HEART: Heart size is normal. No pericardial effusion.  CHEST WALL AND LOWER NECK: Droplets of air in the right lower neck and upper and mid chest wall and upper back. No enlarged axillary lymph nodes.  VISUALIZED UPPER ABDOMEN: Unremarkable.  BONES: Anterior cervical fusion.    IMPRESSION:  Large amount of pneumomediastinum.    Bilateral lung opacities, greatest in the lower lungs compatible with multifocal infection/inflammation including viral pneumonia (COVID-19).    The findings were discussed with Dr. Iniguez on 12/3/2020 3:46 PM    < end of copied text >      PAST MEDICAL & SURGICAL HISTORY:  History of diverticulitis    History of syncope  2/2019 on the plane to [place], was thoroughly evaluated , off HTN medications, was dehydrated.    History of psoriasis    BEV (obstructive sleep apnea)  severe, not using CPAP    History of hypertension  no longer on medications    BPH (benign prostatic hyperplasia)    Hyperlipidemia    Presence of stent in coronary artery  4 stents in LAD 6/2018 . Off    CAD (coronary artery disease)    History of gout    History of hemorrhoids  cauterization ( h/o bleeding due to Plavix )    History of diverticulosis  on colonoscopy    Hard of hearing, bilateral  wears bilateral hearing aids    History of prostate biopsy  multiple- benign    S/P cardiac catheterization  5/2018 and 6/2018 s/p 4 stents in LAD    History of shoulder surgery  right rotator cuff x1, left rotater cuff x 2    History of cataract surgery  b/l few years ago    History of colonoscopy  2018    Cervical disc displacement  replaced c7 - and c 6 disc, Cervical fusion 2014        MEDICATIONS  (STANDING):  allopurinol 300 milliGRAM(s) Oral daily  artificial tears (preservative free) Ophthalmic Solution 1 Drop(s) Both EYES two times a day  ascorbic acid 500 milliGRAM(s) Oral daily  aspirin  chewable 81 milliGRAM(s) Oral daily  atorvastatin 10 milliGRAM(s) Oral at bedtime  cholecalciferol 2000 Unit(s) Oral daily  dexAMETHasone  Injectable 6 milliGRAM(s) IV Push daily  enoxaparin Injectable 40 milliGRAM(s) SubCutaneous every 12 hours  pantoprazole    Tablet 40 milliGRAM(s) Oral before breakfast  polyethylene glycol 3350 17 Gram(s) Oral two times a day  senna 2 Tablet(s) Oral at bedtime    MEDICATIONS  (PRN):  acetaminophen   Tablet .. 650 milliGRAM(s) Oral every 4 hours PRN Temp greater or equal to 38.5C (101.3F)  ALBUTerol    90 MICROgram(s) HFA Inhaler 2 Puff(s) Inhalation every 4 hours PRN Shortness of Breath and/or Wheezing  sodium chloride 0.65% Nasal 1 Spray(s) Both Nostrils four times a day PRN Nasal Congestion    Antiplatelet therapy: ASA, Lovenox                            Last dose/amt:    Allergies    No Known Allergies    Intolerances        SOCIAL HISTORY:  Smoker: [ ] Yes  [x ] No        PACK YEARS:                         WHEN QUIT?  ETOH use: [ ] Yes  [ x] No              FREQUENCY / QUANTITY:  Ilicit Drug use:  [ ] Yes  [ x] No  Occupation:  Live with: wife   Assisted device use:    FAMILY HISTORY:  FH: myocardial infarction (Father)  1989 at age of 78    Family history of essential hypertension (Father)  father        Review of Systems  CONSTITUTIONAL:  Fevers[ ] chills[ ] sweats[ ] fatigue[ ] weight loss[ ] weight gain [ ]                                     NEGATIVE [x ]   NEURO:  parathesias[ ] seizures [ ]  syncope [ ]  confusion [ ]                                                                                NEGATIVE[x ]   EYES: glasses[x ]  blurry vision[ ]  discharge[ ] pain[ ] glaucoma [ ]                                                                          NEGATIVE[ ]   ENMT:  difficulty hearing [x ]  vertigo[ ]  dysphagia[ ] epistaxis[ ] recent dental work [ ]                                    NEGATIVE[ ]   CV:  chest pain[ ] palpitations[ ] MENDOZA [ ] diaphoresis [ ]                                                                                           NEGATIVE[ ]   RESPIRATORY:  wheezing[ ] SOB[x ] cough [x ] sputum[ ] hemoptysis[ ]                                                                  NEGATIVE[ ]   GI:  nausea[ ]  vomiting [ ]  diarrhea[ ] constipation [ ] melena [ ]                                                                      NEGATIVE[x ]   : hematuria[ ]  dysuria[ ] urgency[ ] incontinence[ ]                                                                                            NEGATIVE[ x]   MUSKULOSKELETAL:  arthritis[ ]  joint swelling [ ] muscle weakness [ ]                                                                NEGATIVE[x ]   SKIN/BREAST:  rash[ ] itching [ ]  hair loss[ ] masses[ ]                                                                                              NEGATIVE[ x]   PSYCH:  dementia [ ] depresion [ ] anxiety[ ]                                                                                                               NEGATIVE[x ]   HEME/LYMPH:  bruises easily[ ] enlarged lymph nodes[ ] tender lymph nodes[ ]                                               NEGATIVE[ x]   ENDOCRINE:  cold intolerance[ ] heat intolerance[ ] polydipsia[ ]                                                                          NEGATIVE[x ]     PHYSICAL EXAM  Vital Signs Last 24 Hrs  T(C): 36.3 (03 Dec 2020 16:42), Max: 36.5 (02 Dec 2020 21:09)  T(F): 97.4 (03 Dec 2020 16:42), Max: 97.7 (02 Dec 2020 21:09)  HR: 80 (03 Dec 2020 16:42) (68 - 88)  BP: 110/67 (03 Dec 2020 16:42) (110/67 - 137/87)  BP(mean): --  RR: 18 (03 Dec 2020 16:42) (18 - 18)  SpO2: 96% (03 Dec 2020 16:42) (93% - 97%)    CONSTITUTIONAL:                                                                          WNL[x ]  exam limited due to + COVID -19   Neuro: WNL[ ] Normal exam oriented to person/place & time with no focal motor or sensory  deficits. Other                     Eyes: WNL[ ]   Normal exam of conjunctiva & lids, pupils equally reactive. Other     ENT: WNL[ ]    Normal exam of nasal/oral mucosa with absence of cyanosis. Other  Neck: WNL[ ]  Normal exam of jugular veins, trachea & thyroid. Other  Chest: WNL[ ] Normal lung exam with good air movement absence of wheezes, rales, or rhonchi: Other decreased at bases                                                                                 CV:  Auscultation: normal [ ] S3[ ] S4[ ] Irregular [ ] Rub[ ] Clicks[ ]    Murmurs none:[ ]systolic [ ]  diastolic [ ] holosystolic [ ]  Carotids: No Bruits[ ] Other                 Abdominal Aorta: normal [ ] nonpalpable[ ]Other                                                                                      GI:           WNL[ ] Normal exam of abdomen, liver & spleen with no noted masses or tenderness. Other                                                                                                        Extremities: WNL[ ] Normal no evidence of cyanosis or deformity Edema: none[ ]trace[ ]1+[ ]2+[ ]3+[ ]4+[ ]  Lower Extremity Pulses: Right[ ] Left[ ]Varicosities[ ]  SKIN :WNL[ ] Normal exam to inspection & palation. Other:                                                          LABS:                        13.6   11.32 )-----------( 212      ( 02 Dec 2020 08:28 )             42.2     12-03    x   |  x   |  x   ----------------------------<  x   x    |  x   |  1.00    Ca    8.4<L>      02 Dec 2020 08:28    TPro  5.6<L>  /  Alb  2.3<L>  /  TBili  0.7  /  DBili  0.1  /  AST  28  /  ALT  26  /  AlkPhos  51  12-03

## 2020-12-03 NOTE — PROGRESS NOTE ADULT - SUBJECTIVE AND OBJECTIVE BOX
HOSPITALIST PROGRESS NOTE    DONTA CRABTREE  [id number]  75yMale    Patient is a 75y old  Male who presents with a chief complaint of SOB (02 Dec 2020 18:35)      SUBJECTIVE:   Chart reviewed since last visit.  Patient seen and examined at bedside for AHRF, COVID-19 Pneumonia.  feels the same - denies dyspnea at rest, still requiring 5LNC, awaiting Chest CT    Denies any chest pain, palpitations, dizziness, nausea, vomiting, abdominal pain or diarrhea      OBJECTIVE:  Vital Signs Last 24 Hrs  T(C): 36.5 (03 Dec 2020 08:34), Max: 36.5 (02 Dec 2020 21:09)  T(F): 97.7 (03 Dec 2020 08:34), Max: 97.7 (02 Dec 2020 21:09)  HR: 77 (03 Dec 2020 08:34) (68 - 90)  BP: 118/74 (03 Dec 2020 08:34) (107/70 - 137/87)   RR: 18 (03 Dec 2020 08:34) (18 - 19)  SpO2: 93% (03 Dec 2020 08:34) (91% - 97%)    PHYSICAL EXAMINATION  General: Sitting in chair - NAD  HEENT:  5LNC  NECK:  supple  CVS: regular rate and rhythm S1 S2  RESP:  Fair air entry bilaterally  GI:  Soft nondistended nontender BS+  : No suprapubic tenderness  MSK:  FROM, trace bilateral LE edema  CNS:  No gross focal or global deficit noted  INTEG:  warm dry skin  PSYCH:  Fair mood      MONITOR:  CAPILLARY BLOOD GLUCOSE            I&O's Summary    02 Dec 2020 07:01  -  03 Dec 2020 07:00  --------------------------------------------------------  IN: 0 mL / OUT: 300 mL / NET: -300 mL                            13.6   11.32 )-----------( 212      ( 02 Dec 2020 08:28 )             42.2       12-03    x   |  x   |  x   ----------------------------<  x   x    |  x   |  1.00    Ca    8.4<L>      02 Dec 2020 08:28    TPro  5.6<L>  /  Alb  2.3<L>  /  TBili  0.7  /  DBili  0.1  /  AST  28  /  ALT  26  /  AlkPhos  51  12-03            Culture:    TTE:    RADIOLOGY        MEDICATIONS  (STANDING):  allopurinol 300 milliGRAM(s) Oral daily  artificial tears (preservative free) Ophthalmic Solution 1 Drop(s) Both EYES two times a day  ascorbic acid 500 milliGRAM(s) Oral daily  aspirin  chewable 81 milliGRAM(s) Oral daily  atorvastatin 10 milliGRAM(s) Oral at bedtime  cholecalciferol 2000 Unit(s) Oral daily  dexAMETHasone  Injectable 6 milliGRAM(s) IV Push daily  enoxaparin Injectable 40 milliGRAM(s) SubCutaneous every 12 hours  pantoprazole    Tablet 40 milliGRAM(s) Oral before breakfast  polyethylene glycol 3350 17 Gram(s) Oral two times a day  senna 2 Tablet(s) Oral at bedtime      MEDICATIONS  (PRN):  acetaminophen   Tablet .. 650 milliGRAM(s) Oral every 4 hours PRN Temp greater or equal to 38.5C (101.3F)  ALBUTerol    90 MICROgram(s) HFA Inhaler 2 Puff(s) Inhalation every 4 hours PRN Shortness of Breath and/or Wheezing  sodium chloride 0.65% Nasal 1 Spray(s) Both Nostrils four times a day PRN Nasal Congestion

## 2020-12-04 ENCOUNTER — TRANSCRIPTION ENCOUNTER (OUTPATIENT)
Age: 75
End: 2020-12-04

## 2020-12-04 VITALS
DIASTOLIC BLOOD PRESSURE: 74 MMHG | HEART RATE: 81 BPM | OXYGEN SATURATION: 95 % | TEMPERATURE: 98 F | SYSTOLIC BLOOD PRESSURE: 129 MMHG | RESPIRATION RATE: 20 BRPM

## 2020-12-04 LAB
ANION GAP SERPL CALC-SCNC: 9 MMOL/L — SIGNIFICANT CHANGE UP (ref 5–17)
BUN SERPL-MCNC: 27 MG/DL — HIGH (ref 8–20)
CALCIUM SERPL-MCNC: 8.4 MG/DL — LOW (ref 8.6–10.2)
CHLORIDE SERPL-SCNC: 103 MMOL/L — SIGNIFICANT CHANGE UP (ref 98–107)
CO2 SERPL-SCNC: 26 MMOL/L — SIGNIFICANT CHANGE UP (ref 22–29)
CREAT SERPL-MCNC: 1.15 MG/DL — SIGNIFICANT CHANGE UP (ref 0.5–1.3)
GLUCOSE SERPL-MCNC: 142 MG/DL — HIGH (ref 70–99)
HCT VFR BLD CALC: 43.5 % — SIGNIFICANT CHANGE UP (ref 39–50)
HGB BLD-MCNC: 13.8 G/DL — SIGNIFICANT CHANGE UP (ref 13–17)
MCHC RBC-ENTMCNC: 29.7 PG — SIGNIFICANT CHANGE UP (ref 27–34)
MCHC RBC-ENTMCNC: 31.7 GM/DL — LOW (ref 32–36)
MCV RBC AUTO: 93.5 FL — SIGNIFICANT CHANGE UP (ref 80–100)
PLATELET # BLD AUTO: 215 K/UL — SIGNIFICANT CHANGE UP (ref 150–400)
POTASSIUM SERPL-MCNC: 4.7 MMOL/L — SIGNIFICANT CHANGE UP (ref 3.5–5.3)
POTASSIUM SERPL-SCNC: 4.7 MMOL/L — SIGNIFICANT CHANGE UP (ref 3.5–5.3)
RBC # BLD: 4.65 M/UL — SIGNIFICANT CHANGE UP (ref 4.2–5.8)
RBC # FLD: 17.6 % — HIGH (ref 10.3–14.5)
SODIUM SERPL-SCNC: 137 MMOL/L — SIGNIFICANT CHANGE UP (ref 135–145)
WBC # BLD: 11.33 K/UL — HIGH (ref 3.8–10.5)
WBC # FLD AUTO: 11.33 K/UL — HIGH (ref 3.8–10.5)

## 2020-12-04 PROCEDURE — 99222 1ST HOSP IP/OBS MODERATE 55: CPT

## 2020-12-04 PROCEDURE — 99238 HOSP IP/OBS DSCHRG MGMT 30/<: CPT | Mod: CS

## 2020-12-04 PROCEDURE — 99232 SBSQ HOSP IP/OBS MODERATE 35: CPT | Mod: CS

## 2020-12-04 RX ORDER — CHOLECALCIFEROL (VITAMIN D3) 125 MCG
2000 CAPSULE ORAL
Qty: 0 | Refills: 0 | DISCHARGE
Start: 2020-12-04

## 2020-12-04 RX ORDER — ASCORBIC ACID 60 MG
1 TABLET,CHEWABLE ORAL
Qty: 0 | Refills: 0 | DISCHARGE
Start: 2020-12-04

## 2020-12-04 RX ADMIN — Medication 500 MILLIGRAM(S): at 12:59

## 2020-12-04 RX ADMIN — ENOXAPARIN SODIUM 40 MILLIGRAM(S): 100 INJECTION SUBCUTANEOUS at 06:02

## 2020-12-04 RX ADMIN — Medication 2000 UNIT(S): at 12:59

## 2020-12-04 RX ADMIN — Medication 6 MILLIGRAM(S): at 06:02

## 2020-12-04 RX ADMIN — PANTOPRAZOLE SODIUM 40 MILLIGRAM(S): 20 TABLET, DELAYED RELEASE ORAL at 06:02

## 2020-12-04 RX ADMIN — Medication 1 DROP(S): at 06:02

## 2020-12-04 RX ADMIN — Medication 81 MILLIGRAM(S): at 12:59

## 2020-12-04 RX ADMIN — Medication 300 MILLIGRAM(S): at 12:59

## 2020-12-04 NOTE — DISCHARGE NOTE PROVIDER - CARE PROVIDER_API CALL
PCP,   Primary care physician  Phone: (   )    -  Fax: (   )    -  Follow Up Time:    PCP,   Primary care physician  Phone: (   )    -  Fax: (   )    -  Follow Up Time:     Charles Graf  SURGERY  [address] [place] [address]  Phone: [phone]  Fax: [phone]  Follow Up Time:

## 2020-12-04 NOTE — PROGRESS NOTE ADULT - ASSESSMENT
This is a 74 y/o Male with Acute Hypxoic Respiratory Failure 2/2 + COVID 19 pneumonia admitted since 11/18/2020, treated with Remdesavir and Decaron, currently on 5L NC, developed large pneumomediastinum noted on CXR and subsequent CT chest for which Thoracic Surgery was consulted 12/3/2020. Case and imaging were discussed with Dr. Graf. Currently, no CT Surgery intervention is required.

## 2020-12-04 NOTE — CONSULT NOTE ADULT - PROBLEM SELECTOR RECOMMENDATION 4
-Introduced role of palliative care in symptom management, care planning, support, and transitions in care to those with serious illness.  Emotional support offered.  -Patient shares that he is glad to be doing better.  States that he was gardening before he came in, "and I have to see the tulips come up this spring."  -In the context of hospitalization, explored patient's general preferences around medical care, including CPR and intubation.    -Reports that there are new breakthroughs everyday with COVID-19, and that he would want everything done to prolong his life.  -Discussed with patient that medical decline can happen apart from COVID, and even if patient survived CPR and/or intubation, the quality of life patient may be left with may be one not valuable to him.  -Writer acknowledged, and patient endorses that it is hard to consider these things, especially as he is clinically improving, and will be leaving the hospital.  He offer that he is 75 and things do happen.  -Encouraged patient to consider what matters most when he gets home, and to consider the minimum quality of life that the patient would want to work hard to maintain/attain.  Explained that as health care providers understand this, a medical plan of care that attends to the patient's priorities can be developed, as necessary.  -No health care proxy on file.  Reviewed the role of a health care proxy and the family health care decisions act.  He discussed that he would want his wife to make decisions on his behalf, and is trying to select which one of his children should be the alternate, as he would want a child who "is not too emotional," and who could best represent his preferences.  He will work on this when he gets home.

## 2020-12-04 NOTE — DISCHARGE NOTE PROVIDER - NSDCCPCAREPLAN_GEN_ALL_CORE_FT
PRINCIPAL DISCHARGE DIAGNOSIS  Diagnosis: Acute respiratory disease due to COVID-19 virus  Assessment and Plan of Treatment: Improving, wean O2, follow covid discharge instructions       PRINCIPAL DISCHARGE DIAGNOSIS  Diagnosis: Acute respiratory disease due to COVID-19 virus  Assessment and Plan of Treatment: Improving, wean O2, follow covid discharge instructions      SECONDARY DISCHARGE DIAGNOSES  Diagnosis: HTN (hypertension)  Assessment and Plan of Treatment: Resume home meds    Diagnosis: CAD (coronary artery disease)  Assessment and Plan of Treatment: Resume home meds     PRINCIPAL DISCHARGE DIAGNOSIS  Diagnosis: Acute respiratory disease due to COVID-19 virus  Assessment and Plan of Treatment: Improving, wean supplemental O2, follow covid discharge instructions      SECONDARY DISCHARGE DIAGNOSES  Diagnosis: HTN (hypertension)  Assessment and Plan of Treatment: Resume home meds    Diagnosis: CAD (coronary artery disease)  Assessment and Plan of Treatment: Resume home meds     PRINCIPAL DISCHARGE DIAGNOSIS  Diagnosis: Acute respiratory disease due to COVID-19 virus  Assessment and Plan of Treatment: Improving, wean supplemental O2, follow covid discharge instructions      SECONDARY DISCHARGE DIAGNOSES  Diagnosis: Acute hypoxemic respiratory failure due to COVID-19  Assessment and Plan of Treatment: Supplemental O2    Diagnosis: Acquired pneumomediastinum  Assessment and Plan of Treatment: No intervention - will resorb on own. if worsening breathless ness please seek urgent medixcal attention    Diagnosis: HTN (hypertension)  Assessment and Plan of Treatment: Resume home meds    Diagnosis: CAD (coronary artery disease)  Assessment and Plan of Treatment: Resume home meds

## 2020-12-04 NOTE — PROGRESS NOTE ADULT - SUBJECTIVE AND OBJECTIVE BOX
Subjective: Unable to obtain due to COVID-19 restrictions    Vital Signs:  Vital Signs Last 24 Hrs  T(C): 36.8 (12-04-20 @ 07:41), Max: 36.8 (12-04-20 @ 07:41)  T(F): 98.3 (12-04-20 @ 07:41), Max: 98.3 (12-04-20 @ 07:41)  HR: 67 (12-04-20 @ 07:41) (65 - 80)  BP: 111/71 (12-04-20 @ 07:41) (110/67 - 131/74)  RR: 18 (12-04-20 @ 07:41) (18 - 18)  SpO2: 95% (12-04-20 @ 07:41) (95% - 97%) on (O2)    Relevant labs, radiology and Medications reviewed    Pertinent Physical Exam  Deferred

## 2020-12-04 NOTE — CONSULT NOTE ADULT - SUBJECTIVE AND OBJECTIVE BOX
HPI:  76 yo  obese male from home, lives with wife, with PMHs of CAD s/p multiple stents, BEV, HLD, diverticulitis, psoriasis and gout presented with SOB. Per patient, he was tested positive for covid last Tuesday.  Was seen at urgent care and prescribed 7 days of azithromycin.. For the past 2-3 days noted to have  chills and fatigue.  Today developed SOB, hence came to ED. On admission to ED SpO2 85% on room air. Denies headache, dizziness, chest pain, palpitations, n/v, abdominal pain, change in urination or bowel habits.   Patients multiple family members were tested positive after having a recent  birthday party.  (18 Nov 2020 17:11)        HPI:76 yo male with a history of HLD, CAD, s/p multiple stents, obesity, BEV, diverticulitis, psoriasis, gout, presents to [facility] with SOB.  Patient was tested for COVID, and was prescribed 7 days of azithromycin.   For 2-3 days prior to admission, patient noted to have chills and fatigue, and on day of presentation, developed shortness of breath.  Found to be hypoxic at 85% on RA.  Multiple family members tested positive for COVID after a recent birthday party.  Patient followed by ID.  He has received Remdesavir and Decadron.  Supplemental oxygen requirements have downtrended; he is currently on on 5L via NC.  Patient found to have developed a large pneumomediastinum, initially noted on CXR, and subsequently on CT chest.  Thoracic surgery was consulted on 12/3/2020, no plans for surgical intervention.  For goals of care      PERTINENT PMH REVIEWED: Yes No    PAST MEDICAL & SURGICAL HISTORY:  History of diverticulitis    History of syncope  2/2019 on the plane to [place], was thoroughly evaluated , off HTN medications, was dehydrated.    History of psoriasis    BEV (obstructive sleep apnea)  severe, not using CPAP    History of hypertension  no longer on medications    BPH (benign prostatic hyperplasia)    Hyperlipidemia    Presence of stent in coronary artery  4 stents in LAD 6/2018 . Off    CAD (coronary artery disease)    History of gout    History of hemorrhoids  cauterization ( h/o bleeding due to Plavix )    History of diverticulosis  on colonoscopy    Hard of hearing, bilateral  wears bilateral hearing aids    History of prostate biopsy  multiple- benign    S/P cardiac catheterization  5/2018 and 6/2018 s/p 4 stents in LAD    History of shoulder surgery  right rotator cuff x1, left rotater cuff x 2    History of cataract surgery  b/l few years ago    History of colonoscopy  2018    Cervical disc displacement  replaced c7 - and c 6 disc, Cervical fusion 2014        SOCIAL HISTORY:                                     Admitted from:  home  SNF  [facility]     Surrogate/HCP/Guardian: Phone#:    FAMILY HISTORY:  FH: myocardial infarction (Father)  1989 at age of 78    Family history of essential hypertension (Father)  father        Baseline ADLs (prior to admission):  Independent/ Dependent      Allergies    No Known Allergies    Intolerances        Present Symptoms:     Dyspnea: 0 1 2 3   Nausea/Vomiting: Yes No  Anxiety:  Yes No  Depression: Yes No  Fatigue: Yes No  Loss of appetite: Yes No    Pain:             Character-            Duration-            Effect-            Factors-            Frequency-            Location-            Severity-    Review of Systems: Reviewed                     Negative:                     Positive:  Unable to obtain due to poor mentation   All others negative    MEDICATIONS  (STANDING):  allopurinol 300 milliGRAM(s) Oral daily  artificial tears (preservative free) Ophthalmic Solution 1 Drop(s) Both EYES two times a day  ascorbic acid 500 milliGRAM(s) Oral daily  aspirin  chewable 81 milliGRAM(s) Oral daily  atorvastatin 10 milliGRAM(s) Oral at bedtime  cholecalciferol 2000 Unit(s) Oral daily  dexAMETHasone  Injectable 6 milliGRAM(s) IV Push daily  enoxaparin Injectable 40 milliGRAM(s) SubCutaneous every 12 hours  pantoprazole    Tablet 40 milliGRAM(s) Oral before breakfast  polyethylene glycol 3350 17 Gram(s) Oral two times a day  senna 2 Tablet(s) Oral at bedtime    MEDICATIONS  (PRN):  acetaminophen   Tablet .. 650 milliGRAM(s) Oral every 4 hours PRN Temp greater or equal to 38.5C (101.3F)  ALBUTerol    90 MICROgram(s) HFA Inhaler 2 Puff(s) Inhalation every 4 hours PRN Shortness of Breath and/or Wheezing  sodium chloride 0.65% Nasal 1 Spray(s) Both Nostrils four times a day PRN Nasal Congestion      PHYSICAL EXAM:    Vital Signs Last 24 Hrs  T(C): 36.8 (04 Dec 2020 07:41), Max: 36.8 (04 Dec 2020 07:41)  T(F): 98.3 (04 Dec 2020 07:41), Max: 98.3 (04 Dec 2020 07:41)  HR: 67 (04 Dec 2020 07:41) (65 - 80)  BP: 111/71 (04 Dec 2020 07:41) (110/67 - 131/74)  BP(mean): --  RR: 18 (04 Dec 2020 07:41) (18 - 18)  SpO2: 95% (04 Dec 2020 07:41) (95% - 97%)    General: alert  oriented x ____ lethargic agitated                  cachexia  nonverbal  coma    Karnofsky:  %    HEENT: normal  dry mouth  ET tube/trach    Lungs: comfortable tachypnea/labored breathing  excessive secretions    CV: normal  tachycardia    GI: normal  distended  tender  no BS               PEG/NG/OG tube  constipation  last BM:     : normal  incontinent  oliguria/anuria  almendarez    MSK: normal  weakness  edema             ambulatory  bedbound/wheelchair bound    Skin: normal  pressure ulcers- Stage_____  no rash    LABS:    12-03    x   |  x   |  x   ----------------------------<  x   x    |  x   |  1.00      TPro  5.6<L>  /  Alb  2.3<L>  /  TBili  0.7  /  DBili  0.1  /  AST  28  /  ALT  26  /  AlkPhos  51  12-03        I&O's Summary      RADIOLOGY & ADDITIONAL STUDIES:    ADVANCE DIRECTIVES:   DNR YES NO  Completed on:                     MOLST  YES NO   Completed on:  Living Will  YES NO   Completed on:       HPI:  76 yo  obese male from home, lives with wife, with PMHs of CAD s/p multiple stents, BEV, HLD, diverticulitis, psoriasis and gout presented with SOB. Per patient, he was tested positive for covid last Tuesday.  Was seen at urgent care and prescribed 7 days of azithromycin.. For the past 2-3 days noted to have  chills and fatigue.  Today developed SOB, hence came to ED. On admission to ED SpO2 85% on room air. Denies headache, dizziness, chest pain, palpitations, n/v, abdominal pain, change in urination or bowel habits.   Patients multiple family members were tested positive after having a recent  birthday party.  (2020 17:11)    HPI:76 yo male with a history of HLD, CAD, s/p multiple stents, obesity, BEV, diverticulitis, psoriasis, gout, presents to [facility] with SOB.  Patient was tested for COVID, and was prescribed 7 days of azithromycin.   For 2-3 days prior to admission, patient noted to have chills and fatigue, and on day of presentation, developed shortness of breath.  Found to be hypoxic at 85% on RA.  Multiple family members tested positive for COVID after a recent birthday party.  Patient followed by ID.  He has received Remdesavir and Decadron.  Supplemental oxygen requirements have downtrended; he is currently on on 5L via NC.  Patient found to have developed a large pneumomediastinum, initially noted on CXR, and subsequently on CT chest.  Thoracic surgery was consulted on 12/3/2020, no plans for surgical intervention.  Discharge planning underway; patient is expected to go home this afternoon.    For goals of care.    Per patient, patient presented to [facility] with chills, fatigue and intermittent fever x 2-3 days.  Per patient, he went to see his physician, and he thought he had a sinus infection.  He learned that his daughter was diagnosed with COVID-19 and when he communicated to his physician, he was told to immediately go to the hospital.  He reports that two separate parties were held to celebrate his daughter's 50th birthday. Daughter is a teacher.  At each party, eight people were diagnosed with COVID-19.   He reports that among those who became ill, he had gotten COVID worse than anyone else.    N.B.: History largely obtained from patient via phone: [phone].  Patient has COVID-19.  Exam limited to brief physical inspection at a distance to limit transmission of COVID 19.  At the time of exam, patient denied pain, dyspnea.  Was in NAD.  Further history obtained via phone.    PERTINENT PMH REVIEWED: Yes    PAST MEDICAL & SURGICAL HISTORY:  History of diverticulitis  History of syncope  2019 on the plane to [place], was thoroughly evaluated , off HTN medications, was dehydrated.  History of psoriasis  BEV (obstructive sleep apnea)  severe, not using CPAP  History of hypertension  no longer on medications  BPH (benign prostatic hyperplasia)  Hyperlipidemia  Presence of stent in coronary artery  4 stents in LAD 2018 . Off  CAD (coronary artery disease)  History of gout  History of hemorrhoids  cauterization ( h/o bleeding due to Plavix )  History of diverticulosis  on colonoscopy  Hard of hearing, bilateral  wears bilateral hearing aids  History of prostate biopsy  multiple- benign  S/P cardiac catheterization  2018 and 2018 s/p 4 stents in LAD  History of shoulder surgery  right rotator cuff x1, left rotater cuff x 2  History of cataract surgery  b/l few years ago  History of colonoscopy    Cervical disc displacement  replaced c7 - and c 6 disc, Cervical fusion         SOCIAL HISTORY:  admitted from home.  , 4 adult children who live in the area.                                  Surrogate/HCP/Guardian: wifeKacy Phone#: [phone]    FAMILY HISTORY:  FH: myocardial infarction (Father)  1989 at age of 78  Family history of essential hypertension (Father)  father  CVA: mother ( at 92)      Baseline ADLs (prior to admission): prior to admission, patient independent with ADLs and IADLs.    Allergies  No Known Allergies    Intolerances      Present Symptoms:     Dyspnea: 0   Nausea/Vomiting: No  Anxiety:  No  Depression: No  Fatigue: No  Loss of appetite: mild loss of appetite    Pain: denies           Review of Systems: Reviewed                     Negative: no CP, no dyspnea on exertion or with prolonged conversation                     Positive: sense of taste, smell has returned. Mild, general weakness.  All others negative    MEDICATIONS  (STANDING):  allopurinol 300 milliGRAM(s) Oral daily  artificial tears (preservative free) Ophthalmic Solution 1 Drop(s) Both EYES two times a day  ascorbic acid 500 milliGRAM(s) Oral daily  aspirin  chewable 81 milliGRAM(s) Oral daily  atorvastatin 10 milliGRAM(s) Oral at bedtime  cholecalciferol 2000 Unit(s) Oral daily  dexAMETHasone  Injectable 6 milliGRAM(s) IV Push daily  enoxaparin Injectable 40 milliGRAM(s) SubCutaneous every 12 hours  pantoprazole    Tablet 40 milliGRAM(s) Oral before breakfast  polyethylene glycol 3350 17 Gram(s) Oral two times a day  senna 2 Tablet(s) Oral at bedtime    MEDICATIONS  (PRN):  acetaminophen   Tablet .. 650 milliGRAM(s) Oral every 4 hours PRN Temp greater or equal to 38.5C (101.3F)  ALBUTerol    90 MICROgram(s) HFA Inhaler 2 Puff(s) Inhalation every 4 hours PRN Shortness of Breath and/or Wheezing  sodium chloride 0.65% Nasal 1 Spray(s) Both Nostrils four times a day PRN Nasal Congestion      PHYSICAL EXAM:    Vital Signs Last 24 Hrs  T(C): 36.8 (04 Dec 2020 07:41), Max: 36.8 (04 Dec 2020 07:41)  T(F): 98.3 (04 Dec 2020 07:41), Max: 98.3 (04 Dec 2020 07:41)  HR: 67 (04 Dec 2020 07:41) (65 - 80)  BP: 111/71 (04 Dec 2020 07:41) (110/67 - 131/74)  BP(mean): --  RR: 18 (04 Dec 2020 07:41) (18 - 18)  SpO2: 95% (04 Dec 2020 07:41) (95% - 97%)    Exam limited to brief physical inspection at a distance to prevent spread of COVID-19.  Karnofsky: est: 70-80  %  Gen: In NAD.  No pain behaviors or work of breathing noted  Neuro: alert and oriented x 3, communicates needs  Head: NC/AT  Eyes: sclerae non-icteric  Resp: unlabored  : no almendarez bag  Psych: no psychomotor agitation    LABS:        x   |  x   |  x   ----------------------------<  x   x    |  x   |  1.00      TPro  5.6<L>  /  Alb  2.3<L>  /  TBili  0.7  /  DBili  0.1  /  AST  28  /  ALT  26  /  AlkPhos  51          I&O's Summary      RADIOLOGY & ADDITIONAL STUDIES:    EXAM:  XR CHEST PORTABLE URGENT 1V                        PROCEDURE DATE:  2020      INTERPRETATION:  Portable chest radiograph    CLINICAL INFORMATION:   LEFT lingular infiltrate. Follow-up    TECHNIQUE:  Portable  AP view of the chest was obtained.    COMPARISON: No previous examinations are available for review.    FINDINGS:  The lungs show increasing/LEFT perihilar lower lobe airspace disease. RIGHT lung clear.  There is a pneumomediastinum with an air dissecting into the lower neck soft tissues.  There is cardiomegaly. No hilar mass.  Visualized osseous structures are intact.      IMPRESSION:   Pneumomediastinum. Cardiomegaly..  Increasing LEFT perihilar basilar airspace disease.    VERONICA GARSIA MD; Attending Radiologist  This document has been electronically signed. 2020  6:58PM      EXAM:  CT CHEST                          PROCEDURE DATE:  2020    INTERPRETATION:  CLINICAL INFORMATION: Pneumomediastinum.    COMPARISON: None.    PROCEDURE:  CT of the Chest was performed without intravenous contrast.  Sagittal and coronal reformats were performed.    FINDINGS:    LUNGS AND AIRWAYS: Patent central airways.  Bilateral lung opacities including groundglass opacities with more dense consolidations in the lung bases compatible with multifocal infection/inflammation.  PLEURA: No pleural effusion.  MEDIASTINUM AND FIGUEROA: There is a large amount of pneumomediastinum. No enlarged lymph nodes.  VESSELS: Thoracic aorta normal in caliber with mild calcified plaque. Coronary artery calcifications.  HEART: Heart size is normal. No pericardial effusion.  CHEST WALL AND LOWER NECK: Droplets of air in the right lower neck and upper and mid chest wall and upper back. No enlarged axillary lymph nodes.  VISUALIZED UPPER ABDOMEN: Unremarkable.  BONES: Anterior cervical fusion.    IMPRESSION:  Large amount of pneumomediastinum.    Bilateral lung opacities, greatest in the lower lungs compatible with multifocal infection/inflammation including viral pneumonia (COVID-19).    The findings were discussed with Dr. Iniguez on 12/3/2020 3:46 PM            ALEYDA THOMSON MD; Attending Radiologist  This document has been electronically signed. Dec  3 2020  3:49PM    ADVANCE DIRECTIVES:   DNR NO                     MOLST  NO     Living Will  NO

## 2020-12-04 NOTE — DISCHARGE NOTE PROVIDER - HOSPITAL COURSE
75 year old male with PMH HTN, Dyslipidemia, CAD s/p PCI, BPH, gout, obesity/BEV, Diverticulitis comes in c/o worsening SOB w/ associated chills.  Pt was found to be COVID + in the community and COVID PCR in hospital also +.  ID consulted, pt was started on decadron and Remdesivir. Pt required supplemental oxygen. Hospital course complicated by pneumomediastinum identified on CXR and CT chest likely 2/2 from barotrauma from HFNC. Patient remained stable, with clinical improvement and no deterioration. Pt will be discharged home with home o2 NC. Improve score <4, no AC on discharge. Home care nurse arranged by [facility].     Vital Signs Last 24 Hrs  T(C): 37.1 (04 Dec 2020 10:08), Max: 37.1 (04 Dec 2020 10:08)  T(F): 98.8 (04 Dec 2020 10:08), Max: 98.8 (04 Dec 2020 10:08)  HR: 82 (04 Dec 2020 10:08) (65 - 82)  BP: 118/69 (04 Dec 2020 10:08) (110/67 - 131/74)  BP(mean): --  RR: 18 (04 Dec 2020 10:08) (18 - 18)  SpO2: 96% (04 Dec 2020 10:08) (95% - 97%) on 5L NC    PHYSICAL EXAMINATION  General: Sitting in chair - NAD  HEENT:  5LNC  NECK:  supple  CVS: regular rate and rhythm S1 S2  RESP:  Fair air entry bilaterally  GI:  Soft nondistended nontender BS+  : No suprapubic tenderness  MSK:  FROM, trace bilateral LE edema  CNS:  No gross focal or global deficit noted  INTEG:  warm dry skin  PSYCH:  Fair mood   75 year old male with PMH HTN, Dyslipidemia, CAD s/p PCI, BPH, gout, obesity/BEV, Diverticulitis comes in c/o worsening SOB w/ associated chills.  Pt was found to be COVID + in the community and COVID PCR in hospital also +.  ID consulted, pt was started on decadron and Remdesivir. Pt required supplemental oxygen. Completed 10 day course of remdesivir. Hospital course complicated by pneumomediastinum identified on CXR and CT chest likely 2/2 from barotrauma from HFNC. Patient remained stable, with clinical improvement and no deterioration. Pt will be discharged home with home o2 NC. Improve score <4, no AC on discharge. Home care nurse arranged by [facility].     Vital Signs Last 24 Hrs  T(C): 37.1 (04 Dec 2020 10:08), Max: 37.1 (04 Dec 2020 10:08)  T(F): 98.8 (04 Dec 2020 10:08), Max: 98.8 (04 Dec 2020 10:08)  HR: 82 (04 Dec 2020 10:08) (65 - 82)  BP: 118/69 (04 Dec 2020 10:08) (110/67 - 131/74)  BP(mean): --  RR: 18 (04 Dec 2020 10:08) (18 - 18)  SpO2: 96% (04 Dec 2020 10:08) (95% - 97%) on 5L NC    PHYSICAL EXAMINATION  General: Sitting in chair - NAD  HEENT:  5LNC  NECK:  supple  CVS: regular rate and rhythm S1 S2  RESP:  Fair air entry bilaterally  GI:  Soft nondistended nontender BS+  : No suprapubic tenderness  MSK:  FROM, trace bilateral LE edema  CNS:  No gross focal or global deficit noted  INTEG:  warm dry skin  PSYCH:  Fair mood   75 year old male with PMH HTN, Dyslipidemia, CAD s/p PCI, BPH, gout, obesity/BEV, Diverticulitis comes in c/o worsening SOB w/ associated chills.  Pt was found to be COVID + in the community and COVID PCR in hospital also +.  ID consulted, pt was started on decadron and Remdesivir. Pt required supplemental oxygen. Completed 10 day course of remdesivir. Hospital course complicated by pneumomediastinum identified on CXR and CT chest likely 2/2 from barotrauma from HFNC. Patient remained stable, with clinical improvement and no deterioration. Pt will be discharged home with home o2 NC. Improve score <4, no AC on discharge. Home care nurse arranged by [facility].     Vital Signs Last 24 Hrs  T(C): 37.1 (04 Dec 2020 10:08), Max: 37.1 (04 Dec 2020 10:08)  T(F): 98.8 (04 Dec 2020 10:08), Max: 98.8 (04 Dec 2020 10:08)  HR: 82 (04 Dec 2020 10:08) (65 - 82)  BP: 118/69 (04 Dec 2020 10:08) (110/67 - 131/74)   RR: 18 (04 Dec 2020 10:08) (18 - 18)  SpO2: 96% (04 Dec 2020 10:08) (95% - 97%) on 5L NC    PHYSICAL EXAMINATION  General: Sitting in chair - NAD  HEENT:  5LNC  NECK:  supple  CVS: regular rate and rhythm S1 S2  RESP:  Fair air entry bilaterally  GI:  Soft nondistended nontender BS+  : No suprapubic tenderness  MSK:  FROM, trace bilateral LE edema  CNS:  No gross focal or global deficit noted  INTEG:  warm dry skin  PSYCH:  Fair mood

## 2020-12-04 NOTE — CONSULT NOTE ADULT - PROBLEM SELECTOR RECOMMENDATION 9
cont medical mgmt per primary team
-2/2 COVID PNA.  -Patient has been followed by ID, s/p Remdesivir, on decadron  -patient weaned off of HFNC, now on 5 liters of oxygen via NC.  -Plan for home oxygen.

## 2020-12-04 NOTE — DISCHARGE NOTE PROVIDER - NSDCACTIVITY_GEN_ALL_CORE
Walking - Outdoors allowed/Stairs allowed/Walking - Indoors allowed/Showering allowed/Bathing allowed

## 2020-12-04 NOTE — DISCHARGE NOTE PROVIDER - CARE PROVIDERS DIRECT ADDRESSES
,DirectAddress_Unknown ,DirectAddress_Unknown,[email or web address]

## 2020-12-04 NOTE — CONSULT NOTE ADULT - PROBLEM SELECTOR RECOMMENDATION 2
Dr Graf review CT scan'  NTD at this time   will follow
-likely 2/2 barotrauma from HFNC.  -See by CT surgery, no intervention required.  -Stable for d/c

## 2020-12-04 NOTE — DISCHARGE NOTE PROVIDER - NSDCFUADDAPPT_GEN_ALL_CORE_FT
Please follow up with your primary care physician: Dr Ashish Cai in [place] [phone].  Please Quarantine at home for 14 days post discharge home.

## 2020-12-04 NOTE — DISCHARGE NOTE PROVIDER - PROVIDER TOKENS
FREE:[LAST:[PCP],PHONE:[(   )    -],FAX:[(   )    -],ADDRESS:[Primary care physician]] FREE:[LAST:[PCP],PHONE:[(   )    -],FAX:[(   )    -],ADDRESS:[Primary care physician]],PROVIDER:[TOKEN:[[id number]]]

## 2020-12-04 NOTE — PROGRESS NOTE ADULT - PROVIDER SPECIALTY LIST ADULT
Hospitalist
Infectious Disease
Thoracic Surgery
Hospitalist

## 2020-12-04 NOTE — CHART NOTE - NSCHARTNOTEFT_GEN_A_CORE
Source: Patient [ ]  Family [ ]   other [x ]    Current Diet: Diet, Regular (11-18-20 @ 17:09)    PO intake:  Pt with good po intake at meals, consumes 100% at meals per RN flowsheets    Current Weight:   (11/18) 220 lbs    % Weight Change - no new weight to assess, will continue to monitor.     Pertinent Medications: MEDICATIONS  (STANDING):  allopurinol 300 milliGRAM(s) Oral daily  artificial tears (preservative free) Ophthalmic Solution 1 Drop(s) Both EYES two times a day  ascorbic acid 500 milliGRAM(s) Oral daily  aspirin  chewable 81 milliGRAM(s) Oral daily  atorvastatin 10 milliGRAM(s) Oral at bedtime  cholecalciferol 2000 Unit(s) Oral daily  dexAMETHasone  Injectable 6 milliGRAM(s) IV Push daily  enoxaparin Injectable 40 milliGRAM(s) SubCutaneous every 12 hours  pantoprazole    Tablet 40 milliGRAM(s) Oral before breakfast  polyethylene glycol 3350 17 Gram(s) Oral two times a day  senna 2 Tablet(s) Oral at bedtime    MEDICATIONS  (PRN):  acetaminophen   Tablet .. 650 milliGRAM(s) Oral every 4 hours PRN Temp greater or equal to 38.5C (101.3F)  ALBUTerol    90 MICROgram(s) HFA Inhaler 2 Puff(s) Inhalation every 4 hours PRN Shortness of Breath and/or Wheezing  sodium chloride 0.65% Nasal 1 Spray(s) Both Nostrils four times a day PRN Nasal Congestion    Pertinent Labs: CBC Full  -  ( 02 Dec 2020 08:28 )  WBC Count : 11.32 K/uL  RBC Count : 4.51 M/uL  Hemoglobin : 13.6 g/dL  Hematocrit : 42.2 %  Platelet Count - Automated : 212 K/uL  Mean Cell Volume : 93.6 fl  Mean Cell Hemoglobin : 30.2 pg  Mean Cell Hemoglobin Concentration : 32.2 gm/dL  Auto Neutrophil # : 9.77 K/uL  Auto Lymphocyte # : 0.79 K/uL  Auto Monocyte # : 0.44 K/uL  Auto Eosinophil # : 0.07 K/uL  Auto Basophil # : 0.06 K/uL  Auto Neutrophil % : 86.3 %  Auto Lymphocyte % : 7.0 %  Auto Monocyte % : 3.9 %  Auto Eosinophil % : 0.6 %  Auto Basophil % : 0.5 %  12-03 Nan/a   Glu n/a   K+ n/a   Cr  1.00 mg/dL BUN n/a   Phos n/a   Alb 2.3 g/dL<L> PAB n/a       Skin: no skin breakdown noted     Current Nutrition Diagnosis: Pt remains at nutrition risk secondary to increased nutrient needs related to increased physiologic demand for nutrient as evidenced by pt with acute hypoxic respiratory failure due to COVID PNA.  Pt with good po intake, consuming 100% at meals per RN flowsheets.    Recommendations:   1. Add Ensure daily  2. Continue with Vit C 500mg daily  3. Monitor daily wts     Monitoring and Evaluation:   [ x] PO intake [ x] Tolerance to diet prescription [X] Weights  [X] Follow up per protocol [X] Labs:

## 2020-12-10 DIAGNOSIS — U07.1 COVID-19: ICD-10-CM

## 2020-12-10 DIAGNOSIS — Z51.5 ENCOUNTER FOR PALLIATIVE CARE: ICD-10-CM

## 2020-12-10 DIAGNOSIS — J98.2 INTERSTITIAL EMPHYSEMA: ICD-10-CM

## 2020-12-10 DIAGNOSIS — R53.81 OTHER MALAISE: ICD-10-CM

## 2020-12-10 DIAGNOSIS — J96.01 ACUTE RESPIRATORY FAILURE WITH HYPOXIA: ICD-10-CM

## 2021-01-19 ENCOUNTER — APPOINTMENT (OUTPATIENT)
Dept: PULMONOLOGY | Facility: CLINIC | Age: 76
End: 2021-01-19
Payer: MEDICARE

## 2021-01-19 VITALS
SYSTOLIC BLOOD PRESSURE: 130 MMHG | HEART RATE: 86 BPM | HEIGHT: 69 IN | OXYGEN SATURATION: 96 % | RESPIRATION RATE: 14 BRPM | DIASTOLIC BLOOD PRESSURE: 80 MMHG | BODY MASS INDEX: 30.51 KG/M2 | WEIGHT: 206 LBS

## 2021-01-19 DIAGNOSIS — R60.9 EDEMA, UNSPECIFIED: ICD-10-CM

## 2021-01-19 DIAGNOSIS — J98.2 INTERSTITIAL EMPHYSEMA: ICD-10-CM

## 2021-01-19 DIAGNOSIS — Z01.84 ENCOUNTER FOR ANTIBODY RESPONSE EXAMINATION: ICD-10-CM

## 2021-01-19 PROCEDURE — 99204 OFFICE O/P NEW MOD 45 MIN: CPT | Mod: CS

## 2021-01-19 NOTE — CONSULT LETTER
[Dear  ___] : Dear  [unfilled], [Consult Letter:] : I had the pleasure of evaluating your patient, [unfilled]. [Please see my note below.] : Please see my note below. [Sincerely,] : Sincerely, [FreeTextEntry3] : Maldonado Cristobal [facility]\par Pul[facility] Critical Care\par Director Pulmonary Division\par Medical Director Respiratory Therapy\par [facility]\par \par

## 2021-01-19 NOTE — DISCUSSION/SUMMARY
[FreeTextEntry1] : Acute hypoxemic respiratory failure, post Covid 19 pneumonia\par Underlying, CAD, BEV- intolerant of cpap\par Discharged on 5 liters, treated with Remdesivir and Decadron, had pneumomediastinum on last Ct 12/20\par Now able to stay off 02 at home , rest and exertion - sp02 90s to high 80s\par advised continue 02 at night and with outside activities\par No chest pain, dyspnea much improved\par No crepitations on exam, moderate air entry, normal sp02 rest, no desaturation below 90 today with ex\par No fever, occasional sputum, has been improving since Discharge 12/4\par given xarelto 15 mg by Cardiology, pt hasn’t taken, on baby asa\par Needs repeat CT scan non contrast, duplex lower ext, will repeat D dimer, has hx  GI bleed\par Needs cardiology follow up\par 6 weeks or sooner if needed

## 2021-01-19 NOTE — HISTORY OF PRESENT ILLNESS
[TextBox_4] : prolonged course  November 18, December 4th\par Covid 19 pneumonia\par Hypoxemic respiratory failure\par sent home on 5 liters 02\par post Decadron and Remdesivir\par has CAD, followed [facility]\par Severe BEV by hx, intolerant of CPAP\par much improved, active at home\par breathing is significantly  better\par no fevers, no no chest pain\par has some yellow sputum\par mostly clear\par on baby asa\par was given xarelto 15 mg , pt not taking, concerned about bleeding

## 2021-01-21 ENCOUNTER — APPOINTMENT (OUTPATIENT)
Dept: ULTRASOUND IMAGING | Facility: CLINIC | Age: 76
End: 2021-01-21
Payer: MEDICARE

## 2021-01-21 ENCOUNTER — OUTPATIENT (OUTPATIENT)
Dept: OUTPATIENT SERVICES | Facility: HOSPITAL | Age: 76
LOS: 1 days | End: 2021-01-21
Payer: MEDICARE

## 2021-01-21 ENCOUNTER — RESULT REVIEW (OUTPATIENT)
Age: 76
End: 2021-01-21

## 2021-01-21 ENCOUNTER — APPOINTMENT (OUTPATIENT)
Dept: CT IMAGING | Facility: CLINIC | Age: 76
End: 2021-01-21
Payer: MEDICARE

## 2021-01-21 DIAGNOSIS — Z98.890 OTHER SPECIFIED POSTPROCEDURAL STATES: Chronic | ICD-10-CM

## 2021-01-21 DIAGNOSIS — M50.20 OTHER CERVICAL DISC DISPLACEMENT, UNSPECIFIED CERVICAL REGION: Chronic | ICD-10-CM

## 2021-01-21 DIAGNOSIS — Z98.49 CATARACT EXTRACTION STATUS, UNSPECIFIED EYE: Chronic | ICD-10-CM

## 2021-01-21 DIAGNOSIS — U07.1 COVID-19: ICD-10-CM

## 2021-01-21 PROCEDURE — G1004: CPT

## 2021-01-21 PROCEDURE — 93970 EXTREMITY STUDY: CPT | Mod: 26

## 2021-01-21 PROCEDURE — 71250 CT THORAX DX C-: CPT

## 2021-01-21 PROCEDURE — 93970 EXTREMITY STUDY: CPT

## 2021-01-21 PROCEDURE — 71250 CT THORAX DX C-: CPT | Mod: 26,ME

## 2021-01-22 ENCOUNTER — NON-APPOINTMENT (OUTPATIENT)
Age: 76
End: 2021-01-22

## 2021-02-02 ENCOUNTER — APPOINTMENT (OUTPATIENT)
Dept: CT IMAGING | Facility: CLINIC | Age: 76
End: 2021-02-02
Payer: MEDICARE

## 2021-02-02 ENCOUNTER — NON-APPOINTMENT (OUTPATIENT)
Age: 76
End: 2021-02-02

## 2021-02-02 ENCOUNTER — OUTPATIENT (OUTPATIENT)
Dept: OUTPATIENT SERVICES | Facility: HOSPITAL | Age: 76
LOS: 1 days | End: 2021-02-02
Payer: MEDICARE

## 2021-02-02 DIAGNOSIS — Z98.890 OTHER SPECIFIED POSTPROCEDURAL STATES: Chronic | ICD-10-CM

## 2021-02-02 DIAGNOSIS — F10.20 ALCOHOL DEPENDENCE, UNCOMPLICATED: ICD-10-CM

## 2021-02-02 DIAGNOSIS — Z98.49 CATARACT EXTRACTION STATUS, UNSPECIFIED EYE: Chronic | ICD-10-CM

## 2021-02-02 DIAGNOSIS — G31.2 ALCOHOL DEPENDENCE, UNCOMPLICATED: ICD-10-CM

## 2021-02-02 DIAGNOSIS — M50.20 OTHER CERVICAL DISC DISPLACEMENT, UNSPECIFIED CERVICAL REGION: Chronic | ICD-10-CM

## 2021-02-02 DIAGNOSIS — R79.89 OTHER SPECIFIED ABNORMAL FINDINGS OF BLOOD CHEMISTRY: ICD-10-CM

## 2021-02-02 DIAGNOSIS — R06.02 SHORTNESS OF BREATH: ICD-10-CM

## 2021-02-02 PROCEDURE — 71275 CT ANGIOGRAPHY CHEST: CPT | Mod: 26,ME

## 2021-02-02 PROCEDURE — G1004: CPT

## 2021-02-02 PROCEDURE — 82565 ASSAY OF CREATININE: CPT

## 2021-02-02 PROCEDURE — 71275 CT ANGIOGRAPHY CHEST: CPT

## 2021-03-02 ENCOUNTER — APPOINTMENT (OUTPATIENT)
Dept: PULMONOLOGY | Facility: CLINIC | Age: 76
End: 2021-03-02
Payer: MEDICARE

## 2021-03-02 VITALS
OXYGEN SATURATION: 96 % | WEIGHT: 219 LBS | HEART RATE: 74 BPM | TEMPERATURE: 96.3 F | BODY MASS INDEX: 32.44 KG/M2 | SYSTOLIC BLOOD PRESSURE: 130 MMHG | HEIGHT: 69 IN | DIASTOLIC BLOOD PRESSURE: 76 MMHG

## 2021-03-02 DIAGNOSIS — U07.1 COVID-19: ICD-10-CM

## 2021-03-02 DIAGNOSIS — J98.4 COVID-19: ICD-10-CM

## 2021-03-02 PROCEDURE — 99214 OFFICE O/P EST MOD 30 MIN: CPT | Mod: CS

## 2021-03-02 NOTE — DISCUSSION/SUMMARY
[FreeTextEntry1] : Resolved hypoxemic respiratory failure, post Covid 19 pneumonia\par Underlying, CAD, BEV- intolerant of cpap\par Discharged on 5 liters, treated with Remdesivir and Decadron, had pneumomediastinum on last Ct 12/20\par Now much improved, no desaturation with exercise, off 02\par residual cough, CTA negative for PE , but persistent GG infiltrates\par Decadron taper, repeat CXR 3 weeks, 2 months with PFts and repeat CT scan\par Cpap compliance stressed\par saw cardiologist\par had covid vaccine x 1

## 2021-03-02 NOTE — PROCEDURE
[FreeTextEntry1] : CTA : neg PE, patchy linear and GG, bilat, worse lower lobes, no change from 1/21

## 2021-03-02 NOTE — HISTORY OF PRESENT ILLNESS
[TextBox_4] : prolonged course  November 18, December 4th\par Covid 19 pneumonia\par Hypoxemic respiratory failure\par sent home on 5 liters 02\par post Decadron and Remdesivir\par has CAD, followed [facility]\par Severe BEV by hx, intolerant of CPAP\par much improved, active at home, off 02\par breathing is significantly  better\par no fevers, no no chest pain\par has some sputum\par mostly clear\par on baby asa\par

## 2021-03-02 NOTE — CONSULT LETTER
[Dear  ___] : Dear  [unfilled], [Consult Letter:] : I had the pleasure of evaluating your patient, [unfilled]. [Please see my note below.] : Please see my note below. [Sincerely,] : Sincerely, [FreeTextEntry3] : Maldonado Cristobal DO [facility]\par Pulmonary Critical Care\par Director Pulmonary Division\par Medical Director Respiratory Therapy\par [facility]\par \par

## 2021-03-23 ENCOUNTER — OUTPATIENT (OUTPATIENT)
Dept: OUTPATIENT SERVICES | Facility: HOSPITAL | Age: 76
LOS: 1 days | End: 2021-03-23
Payer: MEDICARE

## 2021-03-23 ENCOUNTER — APPOINTMENT (OUTPATIENT)
Dept: RADIOLOGY | Facility: CLINIC | Age: 76
End: 2021-03-23
Payer: MEDICARE

## 2021-03-23 DIAGNOSIS — Z98.890 OTHER SPECIFIED POSTPROCEDURAL STATES: Chronic | ICD-10-CM

## 2021-03-23 DIAGNOSIS — M50.20 OTHER CERVICAL DISC DISPLACEMENT, UNSPECIFIED CERVICAL REGION: Chronic | ICD-10-CM

## 2021-03-23 DIAGNOSIS — Z86.16 PERSONAL HISTORY OF COVID-19: ICD-10-CM

## 2021-03-23 DIAGNOSIS — Z98.49 CATARACT EXTRACTION STATUS, UNSPECIFIED EYE: Chronic | ICD-10-CM

## 2021-03-23 PROCEDURE — 71046 X-RAY EXAM CHEST 2 VIEWS: CPT

## 2021-03-23 PROCEDURE — 71046 X-RAY EXAM CHEST 2 VIEWS: CPT | Mod: 26

## 2021-03-24 ENCOUNTER — NON-APPOINTMENT (OUTPATIENT)
Age: 76
End: 2021-03-24

## 2021-03-25 ENCOUNTER — NON-APPOINTMENT (OUTPATIENT)
Age: 76
End: 2021-03-25

## 2021-03-25 LAB — DEPRECATED D DIMER PPP IA-ACNC: 191 NG/ML DDU

## 2021-03-26 ENCOUNTER — APPOINTMENT (OUTPATIENT)
Dept: CT IMAGING | Facility: CLINIC | Age: 76
End: 2021-03-26
Payer: MEDICARE

## 2021-03-26 ENCOUNTER — OUTPATIENT (OUTPATIENT)
Dept: OUTPATIENT SERVICES | Facility: HOSPITAL | Age: 76
LOS: 1 days | End: 2021-03-26
Payer: MEDICARE

## 2021-03-26 DIAGNOSIS — Z98.890 OTHER SPECIFIED POSTPROCEDURAL STATES: Chronic | ICD-10-CM

## 2021-03-26 DIAGNOSIS — M50.20 OTHER CERVICAL DISC DISPLACEMENT, UNSPECIFIED CERVICAL REGION: Chronic | ICD-10-CM

## 2021-03-26 DIAGNOSIS — U07.1 COVID-19: ICD-10-CM

## 2021-03-26 DIAGNOSIS — Z98.49 CATARACT EXTRACTION STATUS, UNSPECIFIED EYE: Chronic | ICD-10-CM

## 2021-03-26 PROCEDURE — 71250 CT THORAX DX C-: CPT

## 2021-03-26 PROCEDURE — 71250 CT THORAX DX C-: CPT | Mod: 26,MG

## 2021-03-26 PROCEDURE — G1004: CPT

## 2021-03-30 ENCOUNTER — NON-APPOINTMENT (OUTPATIENT)
Age: 76
End: 2021-03-30

## 2021-05-04 ENCOUNTER — APPOINTMENT (OUTPATIENT)
Dept: PULMONOLOGY | Facility: CLINIC | Age: 76
End: 2021-05-04
Payer: MEDICARE

## 2021-05-04 VITALS
HEIGHT: 69 IN | BODY MASS INDEX: 32.58 KG/M2 | RESPIRATION RATE: 16 BRPM | SYSTOLIC BLOOD PRESSURE: 130 MMHG | DIASTOLIC BLOOD PRESSURE: 82 MMHG | OXYGEN SATURATION: 97 % | WEIGHT: 220 LBS | TEMPERATURE: 97.7 F | HEART RATE: 83 BPM

## 2021-05-04 DIAGNOSIS — U07.1 COVID-19: ICD-10-CM

## 2021-05-04 DIAGNOSIS — J12.82 COVID-19: ICD-10-CM

## 2021-05-04 PROCEDURE — 99214 OFFICE O/P EST MOD 30 MIN: CPT | Mod: CS

## 2021-05-04 RX ORDER — DEXAMETHASONE 2 MG/1
2 TABLET ORAL
Qty: 30 | Refills: 0 | Status: DISCONTINUED | COMMUNITY
Start: 2021-02-02 | End: 2021-05-04

## 2021-05-04 RX ORDER — AMOXICILLIN AND CLAVULANATE POTASSIUM 875; 125 MG/1; MG/1
875-125 TABLET, COATED ORAL
Qty: 20 | Refills: 0 | Status: DISCONTINUED | COMMUNITY
Start: 2020-12-11 | End: 2021-05-04

## 2021-05-04 RX ORDER — DOXYCYCLINE HYCLATE 100 MG/1
100 CAPSULE ORAL
Qty: 10 | Refills: 3 | Status: DISCONTINUED | COMMUNITY
Start: 2021-03-02 | End: 2021-05-04

## 2021-05-04 RX ORDER — TOBRAMYCIN AND DEXAMETHASONE 3; 1 MG/ML; MG/ML
0.3-0.1 SUSPENSION/ DROPS OPHTHALMIC
Qty: 5 | Refills: 0 | Status: DISCONTINUED | COMMUNITY
Start: 2020-04-02 | End: 2021-05-04

## 2021-05-04 NOTE — DISCUSSION/SUMMARY
[FreeTextEntry1] : Resolved hypoxemic respiratory failure, post Covid 19 pneumonia\par Underlying, CAD, BEV- intolerant of cpap, will repeat sleep stsudy\par Discharged on 5 liters, treated with Remdesivir and Decadron, had pneumomediastinum on last Ct 12/20\par Now much improved, no desaturation with exercise, off 02\par CT scan 3/21 with improved covid infiltrates, will repeat 6/21\par intermittent cough\par saw cardiologist\par had covid vaccine x 2\par 3 months with PFts or sooner if needed\par

## 2021-05-04 NOTE — CONSULT LETTER
[Dear  ___] : Dear  [unfilled], [Consult Letter:] : I had the pleasure of evaluating your patient, [unfilled]. [Please see my note below.] : Please see my note below. [Sincerely,] : Sincerely, [FreeTextEntry3] : Maldonado [facility]\par Pul[facility] Critical Care\par Director Pulmonary Division\par Medical Director Respiratory Therapy\par [facility]\par \par

## 2021-05-04 NOTE — PROCEDURE
[FreeTextEntry1] : CTA : neg PE, patchy linear and GG, bilat, worse lower lobes, no change from 1/21\par \par CT scan 3/21: improved GG and reticular infiltrates , compare to 2/21\par D dimer negative

## 2021-05-10 ENCOUNTER — OUTPATIENT (OUTPATIENT)
Dept: OUTPATIENT SERVICES | Facility: HOSPITAL | Age: 76
LOS: 1 days | End: 2021-05-10
Payer: MEDICARE

## 2021-05-10 DIAGNOSIS — Z98.890 OTHER SPECIFIED POSTPROCEDURAL STATES: Chronic | ICD-10-CM

## 2021-05-10 DIAGNOSIS — G47.33 OBSTRUCTIVE SLEEP APNEA (ADULT) (PEDIATRIC): ICD-10-CM

## 2021-05-10 DIAGNOSIS — Z98.49 CATARACT EXTRACTION STATUS, UNSPECIFIED EYE: Chronic | ICD-10-CM

## 2021-05-10 DIAGNOSIS — M50.20 OTHER CERVICAL DISC DISPLACEMENT, UNSPECIFIED CERVICAL REGION: Chronic | ICD-10-CM

## 2021-05-10 PROCEDURE — 95806 SLEEP STUDY UNATT&RESP EFFT: CPT | Mod: 26

## 2021-05-10 PROCEDURE — 95806 SLEEP STUDY UNATT&RESP EFFT: CPT

## 2021-06-03 PROCEDURE — 80048 BASIC METABOLIC PNL TOTAL CA: CPT

## 2021-06-03 PROCEDURE — 83615 LACTATE (LD) (LDH) ENZYME: CPT

## 2021-06-03 PROCEDURE — 93970 EXTREMITY STUDY: CPT

## 2021-06-03 PROCEDURE — U0003: CPT

## 2021-06-03 PROCEDURE — 80053 COMPREHEN METABOLIC PANEL: CPT

## 2021-06-03 PROCEDURE — 86140 C-REACTIVE PROTEIN: CPT

## 2021-06-03 PROCEDURE — 86901 BLOOD TYPING SEROLOGIC RH(D): CPT

## 2021-06-03 PROCEDURE — 85027 COMPLETE CBC AUTOMATED: CPT

## 2021-06-03 PROCEDURE — 85379 FIBRIN DEGRADATION QUANT: CPT

## 2021-06-03 PROCEDURE — 99285 EMERGENCY DEPT VISIT HI MDM: CPT | Mod: 25

## 2021-06-03 PROCEDURE — 84145 PROCALCITONIN (PCT): CPT

## 2021-06-03 PROCEDURE — 82803 BLOOD GASES ANY COMBINATION: CPT

## 2021-06-03 PROCEDURE — 93005 ELECTROCARDIOGRAM TRACING: CPT

## 2021-06-03 PROCEDURE — 85730 THROMBOPLASTIN TIME PARTIAL: CPT

## 2021-06-03 PROCEDURE — 94640 AIRWAY INHALATION TREATMENT: CPT

## 2021-06-03 PROCEDURE — 97110 THERAPEUTIC EXERCISES: CPT

## 2021-06-03 PROCEDURE — 36600 WITHDRAWAL OF ARTERIAL BLOOD: CPT

## 2021-06-03 PROCEDURE — 71045 X-RAY EXAM CHEST 1 VIEW: CPT

## 2021-06-03 PROCEDURE — 86900 BLOOD TYPING SEROLOGIC ABO: CPT

## 2021-06-03 PROCEDURE — 86850 RBC ANTIBODY SCREEN: CPT

## 2021-06-03 PROCEDURE — 36415 COLL VENOUS BLD VENIPUNCTURE: CPT

## 2021-06-03 PROCEDURE — 82728 ASSAY OF FERRITIN: CPT

## 2021-06-03 PROCEDURE — 85652 RBC SED RATE AUTOMATED: CPT

## 2021-06-03 PROCEDURE — 83880 ASSAY OF NATRIURETIC PEPTIDE: CPT

## 2021-06-03 PROCEDURE — 71250 CT THORAX DX C-: CPT

## 2021-06-03 PROCEDURE — 85610 PROTHROMBIN TIME: CPT

## 2021-06-03 PROCEDURE — 85385 FIBRINOGEN ANTIGEN: CPT

## 2021-06-03 PROCEDURE — 86769 SARS-COV-2 COVID-19 ANTIBODY: CPT

## 2021-06-03 PROCEDURE — 82306 VITAMIN D 25 HYDROXY: CPT

## 2021-06-03 PROCEDURE — 80076 HEPATIC FUNCTION PANEL: CPT

## 2021-06-03 PROCEDURE — 82565 ASSAY OF CREATININE: CPT

## 2021-06-03 PROCEDURE — 94760 N-INVAS EAR/PLS OXIMETRY 1: CPT

## 2021-06-03 PROCEDURE — 97163 PT EVAL HIGH COMPLEX 45 MIN: CPT

## 2021-06-03 PROCEDURE — 97116 GAIT TRAINING THERAPY: CPT

## 2021-06-03 PROCEDURE — 84484 ASSAY OF TROPONIN QUANT: CPT

## 2021-06-03 PROCEDURE — 85025 COMPLETE CBC W/AUTO DIFF WBC: CPT

## 2021-06-03 PROCEDURE — 97530 THERAPEUTIC ACTIVITIES: CPT

## 2021-06-03 PROCEDURE — 96374 THER/PROPH/DIAG INJ IV PUSH: CPT

## 2021-06-04 ENCOUNTER — OUTPATIENT (OUTPATIENT)
Dept: OUTPATIENT SERVICES | Facility: HOSPITAL | Age: 76
LOS: 1 days | End: 2021-06-04
Payer: MEDICARE

## 2021-06-04 ENCOUNTER — APPOINTMENT (OUTPATIENT)
Dept: CT IMAGING | Facility: CLINIC | Age: 76
End: 2021-06-04
Payer: MEDICARE

## 2021-06-04 DIAGNOSIS — Z98.890 OTHER SPECIFIED POSTPROCEDURAL STATES: Chronic | ICD-10-CM

## 2021-06-04 DIAGNOSIS — Z98.49 CATARACT EXTRACTION STATUS, UNSPECIFIED EYE: Chronic | ICD-10-CM

## 2021-06-04 DIAGNOSIS — U07.1 COVID-19: ICD-10-CM

## 2021-06-04 DIAGNOSIS — M50.20 OTHER CERVICAL DISC DISPLACEMENT, UNSPECIFIED CERVICAL REGION: Chronic | ICD-10-CM

## 2021-06-04 PROCEDURE — G1004: CPT

## 2021-06-04 PROCEDURE — 71250 CT THORAX DX C-: CPT

## 2021-06-04 PROCEDURE — 71250 CT THORAX DX C-: CPT | Mod: 26,ME

## 2021-06-08 ENCOUNTER — NON-APPOINTMENT (OUTPATIENT)
Age: 76
End: 2021-06-08

## 2021-06-28 ENCOUNTER — APPOINTMENT (OUTPATIENT)
Dept: PULMONOLOGY | Facility: CLINIC | Age: 76
End: 2021-06-28
Payer: MEDICARE

## 2021-06-28 VITALS — RESPIRATION RATE: 16 BRPM | OXYGEN SATURATION: 93 % | HEART RATE: 90 BPM

## 2021-06-28 VITALS — DIASTOLIC BLOOD PRESSURE: 70 MMHG | SYSTOLIC BLOOD PRESSURE: 120 MMHG | WEIGHT: 230 LBS | BODY MASS INDEX: 33.97 KG/M2

## 2021-06-28 PROCEDURE — 99215 OFFICE O/P EST HI 40 MIN: CPT

## 2021-06-28 RX ORDER — PREDNISONE 10 MG/1
10 TABLET ORAL
Qty: 35 | Refills: 6 | Status: COMPLETED | COMMUNITY
Start: 2021-05-07 | End: 2021-06-28

## 2021-06-28 RX ORDER — AZITHROMYCIN 500 MG/1
500 TABLET, FILM COATED ORAL
Qty: 7 | Refills: 0 | Status: COMPLETED | COMMUNITY
Start: 2020-07-14 | End: 2021-06-28

## 2021-06-28 RX ORDER — DEXAMETHASONE 1 MG/1
1 TABLET ORAL
Qty: 30 | Refills: 0 | Status: COMPLETED | COMMUNITY
Start: 2021-03-02 | End: 2021-06-28

## 2021-06-28 RX ORDER — DOXYCYCLINE HYCLATE 100 MG/1
100 CAPSULE ORAL
Qty: 10 | Refills: 3 | Status: COMPLETED | COMMUNITY
Start: 2021-05-07 | End: 2021-06-28

## 2021-06-28 NOTE — REVIEW OF SYSTEMS
[Sinus Problems] : sinus problems [SOB on Exertion] : sob on exertion [Back Pain] : back pain [Fever] : no fever [Chills] : no chills [Nasal Congestion] : no nasal congestion [Postnasal Drip] : no postnasal drip [Cough] : no cough [Chest Tightness] : no chest tightness [Sputum] : no sputum [Dyspnea] : no dyspnea [Pleuritic Pain] : no pleuritic pain [Wheezing] : no wheezing [Chest Discomfort] : no chest discomfort [Edema] : no edema [Palpitations] : no palpitations [Syncope] : no syncope [Seasonal Allergies] : no seasonal allergies [GERD] : no gerd [Abdominal Pain] : no abdominal pain [Nausea] : no nausea [Vomiting] : no vomiting [Diarrhea] : no diarrhea [Constipation] : no constipation [Anemia] : no anemia [Headache] : no headache [Seizures] : no seizures [Dizziness] : no dizziness [Numbness] : no numbness [Paralysis] : no paralysis [Confusion] : no confusion [Depression] : no depression [Anxiety] : no anxiety [Diabetes] : no diabetes [Thyroid Problem] : no thyroid problem

## 2021-06-28 NOTE — DISCUSSION/SUMMARY
[FreeTextEntry1] : \par #1. Will schedule PFTs in near future to assess lung function \par #2. The patient does not appear to require chronic BD therapy at this time\par #3. Diet and exercise for weight loss\par #4. SOBOE is likely related to weight or deconditioning \par #5. Start autoCPAP to treat severe BEV with an AHI of 63.9.\par #6. Follow chest CT for residual scarring from Covid infection per Dr. Cristobal\par #7. Pt had both Covid vaccines \par #8. Reviewed risks of exposure and symptoms of Covid-19 virus, including how the virus is spread and precautions to avoid kendall virus.\par \par Patient's questions were answered and patient appears to understand these recommendations

## 2021-06-28 NOTE — CONSULT LETTER
[Dear  ___] : Dear  [unfilled], [Consult Letter:] : I had the pleasure of evaluating your patient, [unfilled]. [Please see my note below.] : Please see my note below. [Consult Closing:] : Thank you very much for allowing me to participate in the care of this patient.  If you have any questions, please do not hesitate to contact me. [Sincerely,] : Sincerely, [FreeTextEntry3] : Rashad Trent MD, FCCP, D. ABSM\par Pulmonary and Sleep Medicine\par [facility] Sleep [facility] at [place]

## 2021-06-28 NOTE — HISTORY OF PRESENT ILLNESS
[Excessive Daytime Sleepiness] : excessive daytime sleepiness [Witnessed Apnea During Sleep] : witnessed apnea during sleep [Witnessed Gasping During Sleep] : witnessed gasping during sleep [Snoring] : snoring [Unrefreshing Sleep] : unrefreshing sleep [Sleepy When Sedentary] : sleepy when sedentary [Follow-Up - Routine Clinic] : a routine clinic follow-up of [Excess Weight] : excess weight [Currently Experiencing] : The patient is currently experiencing symptoms. [Dyspnea] : dyspnea [Back Pain] : back pain [Dyslipidemia] : dyslipidemia [None] : The patient is not currently being treated for this problem [Sleep Apnea] : sleep apnea [Hypertension] : hypertension [Coronary Artery Disease] : coronary artery disease [High] : high [Low Calorie] : low calorie [Well Balanced Diet] : well balanced meals [Infrequently] : exercises infrequently [TextBox_4] : The patient was hospitalized at [facility] in November 2020 for Covid pneumonia and was d/c'd on O2. He is no longer on supplemental O2 and is followed by Dr. Cristobal for his respiratory status.\par Patient c/o SOBOE but is otherwise without associated respiratory complaints other than mild cough.\par He is on Allegra for allergies.\par Pt is a retired .\par Pt is never smoker.\par Pt presents for f/u after PSG.\par

## 2021-08-07 ENCOUNTER — APPOINTMENT (OUTPATIENT)
Dept: DISASTER EMERGENCY | Facility: CLINIC | Age: 76
End: 2021-08-07

## 2021-08-11 ENCOUNTER — APPOINTMENT (OUTPATIENT)
Dept: PULMONOLOGY | Facility: CLINIC | Age: 76
End: 2021-08-11
Payer: MEDICARE

## 2021-08-11 VITALS — BODY MASS INDEX: 36.41 KG/M2 | WEIGHT: 232 LBS | HEIGHT: 67 IN

## 2021-08-11 VITALS — OXYGEN SATURATION: 98 %

## 2021-08-11 VITALS — HEART RATE: 79 BPM | OXYGEN SATURATION: 94 % | RESPIRATION RATE: 16 BRPM

## 2021-08-11 VITALS — DIASTOLIC BLOOD PRESSURE: 70 MMHG | SYSTOLIC BLOOD PRESSURE: 120 MMHG

## 2021-08-11 PROCEDURE — 99214 OFFICE O/P EST MOD 30 MIN: CPT | Mod: 25

## 2021-08-11 PROCEDURE — 85018 HEMOGLOBIN: CPT | Mod: QW

## 2021-08-11 PROCEDURE — 94729 DIFFUSING CAPACITY: CPT

## 2021-08-11 PROCEDURE — 94727 GAS DIL/WSHOT DETER LNG VOL: CPT

## 2021-08-11 PROCEDURE — 94010 BREATHING CAPACITY TEST: CPT

## 2021-08-11 NOTE — DISCUSSION/SUMMARY
[FreeTextEntry1] : Resolved hypoxemic respiratory failure, post Covid 19 pneumonia,Underlying CAD, \par Severe BEV ( AHI 63) Autopap 4-20 - mask and leak reviewed with Dr Trent\par Will add chin strap- he has sleep MD follow up\par Lung exam is normal, normal sp02\par CT scan 6/21 stable reticular and faint GG- overall improved from 12/20 and stable from 3/21, repeat 6 months\par PFts are essentially normal\par saw cardiologist\par had covid vaccine x 2\par 6 months  or sooner if needed\par

## 2021-08-11 NOTE — CONSULT LETTER
[Dear  ___] : Dear  [unfilled], [Consult Letter:] : I had the pleasure of evaluating your patient, [unfilled]. [Please see my note below.] : Please see my note below. [Consult Closing:] : Thank you very much for allowing me to participate in the care of this patient.  If you have any questions, please do not hesitate to contact me. [Sincerely,] : Sincerely, [FreeTextEntry3] : Maldonado Cristobal DO [facility]\par Pulmonary Critical Care\par Director Pulmonary Division\par Medical Director Respiratory Therapy\par [facility]\par \par

## 2021-08-11 NOTE — PROCEDURE
[FreeTextEntry1] : CTA : neg PE, patchy linear and GG, bilat, worse lower lobes, no change from 1/21\par \par CT scan 6/21: stable soft GG and reticular infiltrates , stable from 3/21\par D dimer negative

## 2021-08-11 NOTE — COUNSELING
[Potential consequences of obesity discussed] : Potential consequences of obesity discussed [Benefits of weight loss discussed] : Benefits of weight loss discussed [Encouraged to increase physical activity] : Encouraged to increase physical activity [FreeTextEntry2] : never smoker

## 2021-08-11 NOTE — HISTORY OF PRESENT ILLNESS
[Excessive Daytime Sleepiness] : excessive daytime sleepiness [Witnessed Apnea During Sleep] : witnessed apnea during sleep [Witnessed Gasping During Sleep] : witnessed gasping during sleep [Snoring] : snoring [Unrefreshing Sleep] : unrefreshing sleep [Sleepy When Sedentary] : sleepy when sedentary [Follow-Up - Routine Clinic] : a routine clinic follow-up of [Excess Weight] : excess weight [Currently Experiencing] : The patient is currently experiencing symptoms. [Dyspnea] : dyspnea [Back Pain] : back pain [None] : The patient is not currently being treated for this problem [Dyslipidemia] : dyslipidemia [Sleep Apnea] : sleep apnea [Hypertension] : hypertension [Coronary Artery Disease] : coronary artery disease [High] : high [Low Calorie] : low calorie [Well Balanced Diet] : well balanced meals [Infrequently] : exercises infrequently [TextBox_4] : prolonged course  November 18, December 4th\par Covid 19 pneumonia\par Hypoxemic respiratory failure\par post Decadron and Remdesivir\par has CAD, followed [facility]\par Severe BEV - on autopap 4-20- bothered by air leak\par near baseline\par no fevers, no no chest pain\par has some sputum\par mostly clear\par on baby asa\par

## 2021-08-11 NOTE — REVIEW OF SYSTEMS
[Sinus Problems] : sinus problems [SOB on Exertion] : sob on exertion [Back Pain] : back pain [Negative] : Psychiatric [Fever] : no fever [Chills] : no chills [Nasal Congestion] : no nasal congestion [Postnasal Drip] : no postnasal drip [Cough] : no cough [Chest Tightness] : no chest tightness [Sputum] : no sputum [Dyspnea] : no dyspnea [Pleuritic Pain] : no pleuritic pain [Wheezing] : no wheezing [Chest Discomfort] : no chest discomfort [Edema] : no edema [Palpitations] : no palpitations [Syncope] : no syncope [Seasonal Allergies] : no seasonal allergies [GERD] : no gerd [Abdominal Pain] : no abdominal pain [Nausea] : no nausea [Vomiting] : no vomiting [Diarrhea] : no diarrhea [Constipation] : no constipation [Anemia] : no anemia [Headache] : no headache [Seizures] : no seizures [Dizziness] : no dizziness [Numbness] : no numbness [Paralysis] : no paralysis [Confusion] : no confusion [Depression] : no depression [Anxiety] : no anxiety [Diabetes] : no diabetes [Thyroid Problem] : no thyroid problem [TextBox_30] : see HPI

## 2021-08-11 NOTE — REASON FOR VISIT
[Follow-Up] : a follow-up visit [Sleep Apnea] : sleep apnea [Shortness of Breath] : shortness of breath [Obesity] : obesity [TextBox_44] : covid 19 pneumonia, hypoxemic resp failure

## 2021-09-09 NOTE — ASU PATIENT PROFILE, ADULT - PSH
Cervical disc displacement  replaced c7 - and c 6 disc, Cervical fusion 2014  History of cataract surgery  b/l few years ago  History of colonoscopy  2018  History of prostate biopsy  multiple- benign  History of shoulder surgery  right rotator cuff x1, left rotater cuff x 2  S/P cardiac catheterization  5/2018 and 6/2018 s/p 4 stents in LAD KARISSA VILLALBA 52y [id number]    Patient is a 52y old  Male who presents with a chief complaint of covid pna (09 Sep 2021 13:29)      Follow Up/CC:  ID following for COVID    Interval History/ROS: no fever, CT clamped per thoracic     Allergies    No Known Allergies    Intolerances        ANTIMICROBIALS:      MEDICATIONS  (STANDING):  benzocaine 15 mG/menthol 3.6 mG (Sugar-Free) Lozenge 1 Lozenge Oral once  benzonatate 200 milliGRAM(s) Oral every 8 hours  budesonide 160 MICROgram(s)/formoterol 4.5 MICROgram(s) Inhaler 2 Puff(s) Inhalation two times a day  cholecalciferol 2000 Unit(s) Oral daily  clonazePAM  Tablet 0.5 milliGRAM(s) Oral every 8 hours  enoxaparin Injectable 40 milliGRAM(s) SubCutaneous daily  FIRST- Mouthwash  BLM 5 milliLiter(s) Swish and Spit every 6 hours  lidocaine   4% Patch 1 Patch Transdermal every 24 hours  melatonin 3 milliGRAM(s) Oral at bedtime  multivitamin 1 Tablet(s) Oral daily  pantoprazole  Injectable 40 milliGRAM(s) IV Push daily  polyethylene glycol 3350 17 Gram(s) Oral daily  senna 2 Tablet(s) Oral at bedtime    MEDICATIONS  (PRN):  acetaminophen   Tablet .. 975 milliGRAM(s) Oral every 6 hours PRN Mild Pain (1 - 3)  albuterol/ipratropium for Nebulization 3 milliLiter(s) Nebulizer every 6 hours PRN Shortness of Breath and/or Wheezing  aluminum hydroxide/magnesium hydroxide/simethicone Suspension 30 milliLiter(s) Oral every 4 hours PRN Dyspepsia  benzocaine 15 mG/menthol 3.6 mG (Sugar-Free) Lozenge 1 Lozenge Oral four times a day PRN Sore Throat  cyclobenzaprine 5 milliGRAM(s) Oral three times a day PRN Muscle Spasm  HYDROmorphone  Injectable 1 milliGRAM(s) IV Push every 4 hours PRN Severe Pain (7 - 10)  sodium chloride 0.65% Nasal 1 Spray(s) Both Nostrils every 2 hours PRN Nasal Congestion  traMADol 25 milliGRAM(s) Oral every 6 hours PRN Moderate Pain (4 - 6)        Vital Signs Last 24 Hrs  T(C): 37.1 (09 Sep 2021 12:00), Max: 37.1 (09 Sep 2021 12:00)  T(F): 98.7 (09 Sep 2021 12:00), Max: 98.7 (09 Sep 2021 12:00)  HR: 95 (09 Sep 2021 11:24) (87 - 111)  BP: 103/71 (09 Sep 2021 11:24) (103/71 - 115/75)  BP(mean): --  RR: 16 (09 Sep 2021 11:24) (16 - 20)  SpO2: 100% (09 Sep 2021 11:24) (86% - 100%)    CBC Full  -  ( 09 Sep 2021 06:45 )  WBC Count : 10.62 K/uL  RBC Count : 3.59 M/uL  Hemoglobin : 9.7 g/dL  Hematocrit : 32.0 %  Platelet Count - Automated : 143 K/uL  Mean Cell Volume : 89.1 fl  Mean Cell Hemoglobin : 27.0 pg  Mean Cell Hemoglobin Concentration : 30.3 gm/dL  Auto Neutrophil # : x  Auto Lymphocyte # : x  Auto Monocyte # : x  Auto Eosinophil # : x  Auto Basophil # : x  Auto Neutrophil % : x  Auto Lymphocyte % : x  Auto Monocyte % : x  Auto Eosinophil % : x  Auto Basophil % : x    09-09    138  |  96  |  4<L>  ----------------------------<  86  3.9   |  30  |  0.77    Ca    9.3      09 Sep 2021 06:45  Phos  3.2     09-09  Mg     2.1     09-09    TPro  6.8  /  Alb  2.6<L>  /  TBili  0.2  /  DBili  x   /  AST  15  /  ALT  18  /  AlkPhos  63  09-09    LIVER FUNCTIONS - ( 09 Sep 2021 06:45 )  Alb: 2.6 g/dL / Pro: 6.8 g/dL / ALK PHOS: 63 U/L / ALT: 18 U/L / AST: 15 U/L / GGT: x               MICROBIOLOGY:  .Body Fluid Pleural Fluid  08-26-21   No growth at 6 days.  Organism seen in Gram stain is non-viable after prolonged  incubation and repeated subculture.  --    polymorphonuclear leukocytes  Gram Negative Rods  by cytocentrifuge      .Blood Blood  08-26-21   No Growth Final  --  --      .Blood Blood  08-25-21   No Growth Final  --  --      .Blood Blood-Peripheral  08-11-21   No Growth Final  --  --              v            RADIOLOGY

## 2021-10-06 PROBLEM — U07.1 PNEUMONIA DUE TO COVID-19 VIRUS: Status: ACTIVE | Noted: 2021-01-19

## 2021-11-04 ENCOUNTER — APPOINTMENT (OUTPATIENT)
Dept: PULMONOLOGY | Facility: CLINIC | Age: 76
End: 2021-11-04
Payer: MEDICARE

## 2021-11-04 VITALS
WEIGHT: 228 LBS | HEART RATE: 96 BPM | SYSTOLIC BLOOD PRESSURE: 128 MMHG | OXYGEN SATURATION: 93 % | BODY MASS INDEX: 35.71 KG/M2 | DIASTOLIC BLOOD PRESSURE: 74 MMHG

## 2021-11-04 DIAGNOSIS — Z86.16 PERSONAL HISTORY OF COVID-19: ICD-10-CM

## 2021-11-04 DIAGNOSIS — Z20.822 SHORTNESS OF BREATH: ICD-10-CM

## 2021-11-04 DIAGNOSIS — R06.02 SHORTNESS OF BREATH: ICD-10-CM

## 2021-11-04 PROCEDURE — 99214 OFFICE O/P EST MOD 30 MIN: CPT

## 2021-11-04 RX ORDER — SUCRALFATE 1 G/1
1 TABLET ORAL
Qty: 120 | Refills: 0 | Status: DISCONTINUED | COMMUNITY
Start: 2020-08-10 | End: 2021-11-04

## 2021-11-04 RX ORDER — FUROSEMIDE 20 MG/1
20 TABLET ORAL
Qty: 60 | Refills: 0 | Status: DISCONTINUED | COMMUNITY
Start: 2020-12-14 | End: 2021-11-04

## 2021-11-04 RX ORDER — RIVAROXABAN 15 MG/1
15 TABLET, FILM COATED ORAL
Qty: 30 | Refills: 0 | Status: DISCONTINUED | COMMUNITY
Start: 2020-12-18 | End: 2021-11-04

## 2021-11-04 RX ORDER — CLOBETASOL PROPIONATE 0.5 MG/G
0.05 OINTMENT TOPICAL
Qty: 15 | Refills: 0 | Status: DISCONTINUED | COMMUNITY
Start: 2020-01-13 | End: 2021-11-04

## 2021-11-04 RX ORDER — OMEPRAZOLE 20 MG/1
20 CAPSULE, DELAYED RELEASE ORAL
Qty: 30 | Refills: 0 | Status: DISCONTINUED | COMMUNITY
Start: 2020-08-10 | End: 2021-11-04

## 2021-11-04 RX ORDER — POTASSIUM CHLORIDE 750 MG/1
10 TABLET, EXTENDED RELEASE ORAL
Qty: 60 | Refills: 0 | Status: DISCONTINUED | COMMUNITY
Start: 2020-12-14 | End: 2021-11-04

## 2021-11-04 NOTE — COUNSELING
[Potential consequences of obesity discussed] : Potential consequences of obesity discussed [Benefits of weight loss discussed] : Benefits of weight loss discussed [Encouraged to increase physical activity] : Encouraged to increase physical activity qtc 467 ms

## 2021-11-18 ENCOUNTER — APPOINTMENT (OUTPATIENT)
Dept: CT IMAGING | Facility: CLINIC | Age: 76
End: 2021-11-18
Payer: MEDICARE

## 2021-11-18 ENCOUNTER — OUTPATIENT (OUTPATIENT)
Dept: OUTPATIENT SERVICES | Facility: HOSPITAL | Age: 76
LOS: 1 days | End: 2021-11-18
Payer: MEDICARE

## 2021-11-18 DIAGNOSIS — Z98.890 OTHER SPECIFIED POSTPROCEDURAL STATES: Chronic | ICD-10-CM

## 2021-11-18 DIAGNOSIS — R91.8 OTHER NONSPECIFIC ABNORMAL FINDING OF LUNG FIELD: ICD-10-CM

## 2021-11-18 DIAGNOSIS — Z98.49 CATARACT EXTRACTION STATUS, UNSPECIFIED EYE: Chronic | ICD-10-CM

## 2021-11-18 DIAGNOSIS — M50.20 OTHER CERVICAL DISC DISPLACEMENT, UNSPECIFIED CERVICAL REGION: Chronic | ICD-10-CM

## 2021-11-18 PROCEDURE — 71250 CT THORAX DX C-: CPT | Mod: 26,MH

## 2021-11-18 PROCEDURE — 71250 CT THORAX DX C-: CPT

## 2021-11-22 ENCOUNTER — NON-APPOINTMENT (OUTPATIENT)
Age: 76
End: 2021-11-22

## 2021-11-24 NOTE — DISCUSSION/SUMMARY
[FreeTextEntry1] : \par #1. PFTs performed today are essentially normal but with a borderline TLC and mildly reduced diffusion capacity which corrected for alveolar volume. \par #2. The patient does not appear to require chronic BD therapy at this time\par #3. SOBOE is likely related to weight or deconditioning given normal PFTs\par #4. Diet and exercise for weight loss \par #5. Continue autoCPAP to treat severe BEV with an AHI of 63.9; will adjust pressures to 10-20 cm of water; asked pt to adjust mask for excess leak; The patient is using and benefitting from CPAP therapy \par #6. Follow chest CT for residual scarring from Covid infection per Dr. Cristobal\par #7. Pt had both Covid vaccines; rec flu vaccine\par #8. F/u 4-6 weeks with compliance\par #9. Reviewed risks of exposure and symptoms of Covid-19 virus, including how the virus is spread and precautions to avoid kendall virus.\par \par Patient's questions were answered and patient appears to understand these recommendations

## 2021-11-24 NOTE — PROCEDURE
[FreeTextEntry1] : PFTs 8/11/21 - normal with borderline TLC and mildly reduced diffusion capacity which corrected for alveolar volume.

## 2021-11-24 NOTE — HISTORY OF PRESENT ILLNESS
[Excessive Daytime Sleepiness] : excessive daytime sleepiness [Unrefreshing Sleep] : unrefreshing sleep [Sleepy When Sedentary] : sleepy when sedentary [Follow-Up - Routine Clinic] : a routine clinic follow-up of [Excess Weight] : excess weight [Currently Experiencing] : The patient is currently experiencing symptoms. [Dyspnea] : dyspnea [Back Pain] : back pain [None] : The patient is not currently being treated for this problem [Dyslipidemia] : dyslipidemia [Sleep Apnea] : sleep apnea [Hypertension] : hypertension [Coronary Artery Disease] : coronary artery disease [High] : high [Low Calorie] : low calorie [Well Balanced Diet] : well balanced meals [Infrequently] : exercises infrequently [TextBox_4] : The patient was hospitalized at [facility] in November 2020 for Covid pneumonia and was d/c'd on O2. He is no longer on supplemental O2 and is followed by Dr. Cristobal for his respiratory status.\par Patient c/o SOBOE but is otherwise without associated respiratory complaints other than mild cough.\par He is on Allegra for allergies.\par Pt is a retired .\par Pt is never smoker.\par Pt presents for f/u after PSG.\par  [Witnessed Apnea During Sleep] : no witnessed apnea during sleep [Witnessed Gasping During Sleep] : no witnessed gasping during sleep [Snoring] : no snoring [Good Compliance] : good compliance with treatment [Good Tolerance] : good tolerance of treatment [Fair Symptom Control] : fair symptom control [de-identified] : APAP

## 2021-11-24 NOTE — CONSULT LETTER
[Dear  ___] : Dear  [unfilled], [Consult Letter:] : I had the pleasure of evaluating your patient, [unfilled]. [Please see my note below.] : Please see my note below. [Consult Closing:] : Thank you very much for allowing me to participate in the care of this patient.  If you have any questions, please do not hesitate to contact me. [Sincerely,] : Sincerely, [FreeTextEntry3] : Rashad Trent MD, FCCP, D. ABSM\par Pulmonary and Sleep Medicine\par [facility] Pulmonary and Sleep Medicine at [place]

## 2021-12-15 NOTE — ED PROVIDER NOTE - PMH
Continue home regimen of trazodone and as needed melatonin   BPH (benign prostatic hyperplasia)    CAD (coronary artery disease)    Hard of hearing, bilateral  wears bilateral hearing aids  History of diverticulitis    History of diverticulosis  on colonoscopy  History of gout    History of hemorrhoids  cauterization ( h/o bleeding due to Plavix )  History of hypertension  no longer on medications  History of psoriasis    History of syncope  2/2019 on the plane to [place], was thoroughly evaluated , off HTN medications, was dehydrated.  Hyperlipidemia    BEV (obstructive sleep apnea)  severe, not using CPAP  Presence of stent in coronary artery  4 stents in LAD 6/2018 . Off

## 2021-12-16 ENCOUNTER — APPOINTMENT (OUTPATIENT)
Dept: PULMONOLOGY | Facility: CLINIC | Age: 76
End: 2021-12-16
Payer: MEDICARE

## 2021-12-16 DIAGNOSIS — U09.9 POST COVID-19 CONDITION, UNSPECIFIED: ICD-10-CM

## 2021-12-16 PROCEDURE — 99446 NTRPROF PH1/NTRNET/EHR 5-10: CPT

## 2021-12-16 NOTE — PROCEDURE
[FreeTextEntry1] : CTA : neg PE, patchy linear and GG, bilat, worse lower lobes, no change from 1/21\par \par CT scan 6/21: stable soft GG and reticular infiltrates , stable from 3/21\par \par CT 11/21 : stable mild scarring, post Covid\par D dimer negative

## 2021-12-16 NOTE — REVIEW OF SYSTEMS
[Fever] : no fever [Chills] : no chills [Nasal Congestion] : no nasal congestion [Postnasal Drip] : no postnasal drip [Sinus Problems] : sinus problems [Cough] : no cough [Chest Tightness] : no chest tightness [Sputum] : no sputum [Dyspnea] : no dyspnea [Pleuritic Pain] : no pleuritic pain [Wheezing] : no wheezing [SOB on Exertion] : sob on exertion [Chest Discomfort] : no chest discomfort [Edema] : no edema [Palpitations] : no palpitations [Syncope] : no syncope [Seasonal Allergies] : no seasonal allergies [GERD] : no gerd [Abdominal Pain] : no abdominal pain [Nausea] : no nausea [Vomiting] : no vomiting [Diarrhea] : no diarrhea [Constipation] : no constipation [Back Pain] : back pain [Anemia] : no anemia [Headache] : no headache [Seizures] : no seizures [Dizziness] : no dizziness [Numbness] : no numbness [Paralysis] : no paralysis [Confusion] : no confusion [Depression] : no depression [Anxiety] : no anxiety [Diabetes] : no diabetes [Thyroid Problem] : no thyroid problem [Negative] : Psychiatric [TextBox_30] : see HPI

## 2021-12-16 NOTE — DISCUSSION/SUMMARY
[FreeTextEntry1] : Resolved hypoxemic respiratory failure, post Covid 19 pneumonia,Underlying CAD, \par Severe BEV ( AHI 63) Autopap 4-20 -\par CT scan 11/21 stable , post Covid \par PFts are essentially normal\par now with Influenza by hx, on Tamiflu\par no acute pulmonary complaints, has pulse oximeter, "97%"\par Followed by  cardiologist\par had covid vaccine x 3\par 4 months  or sooner if needed, to call if any change in status\par

## 2021-12-16 NOTE — HISTORY OF PRESENT ILLNESS
[TextBox_4] : \par has CAD, followed [facility]\par Severe BEV - on autopap 4-20- bothered by air leak\par near baseline\par no fevers, no no chest pain\par has some sputum\par mostly clear\par on baby asa\par saw Dr Cai, told Flu\par not Covid\par on tamiflu\par checks pulse oximeter 97%\par \par

## 2021-12-16 NOTE — CONSULT LETTER
[Dear  ___] : Dear  [unfilled], [Consult Letter:] : I had the pleasure of evaluating your patient, [unfilled]. [Please see my note below.] : Please see my note below. [Consult Closing:] : Thank you very much for allowing me to participate in the care of this patient.  If you have any questions, please do not hesitate to contact me. [Sincerely,] : Sincerely, [FreeTextEntry3] : Maldonado Cristobal DO [facility]\par Pulmonary Critical Care\par Director Pulmonary Division\par Medical Director Respiratory Therapy\par [facility]\par \par

## 2021-12-28 ENCOUNTER — APPOINTMENT (OUTPATIENT)
Dept: PULMONOLOGY | Facility: CLINIC | Age: 76
End: 2021-12-28

## 2022-03-01 NOTE — ED PROVIDER NOTE - NS ED MD DISPO DISCHARGE CCDA
Patient notified via portal.    Racheal Sanchez RN     updated Patient/Caregiver provided printed discharge information.

## 2022-06-04 NOTE — H&P PST ADULT - LAST STRESS TEST
Please call patient to help schedule her injection  [phone]. 5/29/19 - abnormal 4 = No assist / stand by assistance

## 2022-06-09 ENCOUNTER — APPOINTMENT (OUTPATIENT)
Dept: PULMONOLOGY | Facility: CLINIC | Age: 77
End: 2022-06-09
Payer: MEDICARE

## 2022-06-09 VITALS
OXYGEN SATURATION: 93 % | HEART RATE: 107 BPM | RESPIRATION RATE: 16 BRPM | DIASTOLIC BLOOD PRESSURE: 76 MMHG | SYSTOLIC BLOOD PRESSURE: 124 MMHG | WEIGHT: 236 LBS | HEIGHT: 67 IN | BODY MASS INDEX: 37.04 KG/M2

## 2022-06-09 VITALS — OXYGEN SATURATION: 97 %

## 2022-06-09 DIAGNOSIS — J40 CHRONIC SINUSITIS, UNSPECIFIED: ICD-10-CM

## 2022-06-09 DIAGNOSIS — J32.9 CHRONIC SINUSITIS, UNSPECIFIED: ICD-10-CM

## 2022-06-09 PROCEDURE — 99215 OFFICE O/P EST HI 40 MIN: CPT

## 2022-06-09 RX ORDER — DOXYCYCLINE HYCLATE 100 MG/1
100 CAPSULE ORAL
Qty: 10 | Refills: 0 | Status: DISCONTINUED | COMMUNITY
Start: 2021-11-22 | End: 2022-06-09

## 2022-06-09 RX ORDER — EZETIMIBE 10 MG/1
10 TABLET ORAL
Refills: 0 | Status: ACTIVE | COMMUNITY

## 2022-06-09 RX ORDER — EZETIMIBE 10 MG/1
10 TABLET ORAL
Qty: 30 | Refills: 0 | Status: DISCONTINUED | COMMUNITY
Start: 2019-10-15 | End: 2022-06-09

## 2022-06-09 NOTE — HISTORY OF PRESENT ILLNESS
[TextBox_4] : has CAD, followed [facility]\par Severe BEV - on autopap 4-20-\par has not worn mask secondary to sputum\par has been on chronic abx every month with discolored sputum\par per pt feels more sinus congestion\par no fevers, had chest pain this am, resolved post bathroom \par has some whitish \par on baby asa\par \par \par \par

## 2022-06-09 NOTE — DISCUSSION/SUMMARY
[FreeTextEntry1] :  post Covid 19 with residual scarring, traction bronchiectasis,Underlying CAD, \par Severe BEV ( AHI 63) Autopap 4-20 - not using\par CT scan 11/21 stable , post Covid PFts were  essentially normal\par Recurrent sinusitis with cough by hx, using non sedating anti histamine\par Did not like nasal sprays \par Current lung exam is normal, normal sp02\par Advised ER for chest pain earlier today, does not want but he will call Cardiology , importance stressed\par No pain currently \par Will use Nedi pot with saline to facilitate sinus drainage\par CT sinus and ENT evaluation\par restart autopap use\par CT chest follow up post covid scarring\par sputum c/s, AURA\par Trial of Azithromycin monthly\par 3 months or sooner if needed, will call with above\par \par

## 2022-06-09 NOTE — CONSULT LETTER
[Dear  ___] : Dear  [unfilled], [Consult Letter:] : I had the pleasure of evaluating your patient, [unfilled]. [Please see my note below.] : Please see my note below. [Consult Closing:] : Thank you very much for allowing me to participate in the care of this patient.  If you have any questions, please do not hesitate to contact me. [Sincerely,] : Sincerely, [FreeTextEntry3] : Maldonado Cristobal DO [facility]\par Pulmonary Critical Care\par Director Pulmonary Division\par Medical Director Respiratory Therapy\par [facility]\par \par

## 2022-06-17 ENCOUNTER — OUTPATIENT (OUTPATIENT)
Dept: OUTPATIENT SERVICES | Facility: HOSPITAL | Age: 77
LOS: 1 days | End: 2022-06-17
Payer: MEDICARE

## 2022-06-17 ENCOUNTER — APPOINTMENT (OUTPATIENT)
Dept: CT IMAGING | Facility: CLINIC | Age: 77
End: 2022-06-17
Payer: MEDICARE

## 2022-06-17 DIAGNOSIS — J32.9 CHRONIC SINUSITIS, UNSPECIFIED: ICD-10-CM

## 2022-06-17 DIAGNOSIS — Z98.890 OTHER SPECIFIED POSTPROCEDURAL STATES: Chronic | ICD-10-CM

## 2022-06-17 DIAGNOSIS — Z98.49 CATARACT EXTRACTION STATUS, UNSPECIFIED EYE: Chronic | ICD-10-CM

## 2022-06-17 DIAGNOSIS — M50.20 OTHER CERVICAL DISC DISPLACEMENT, UNSPECIFIED CERVICAL REGION: Chronic | ICD-10-CM

## 2022-06-17 DIAGNOSIS — U09.9 POST COVID-19 CONDITION, UNSPECIFIED: ICD-10-CM

## 2022-06-17 PROCEDURE — 71250 CT THORAX DX C-: CPT | Mod: 26,MH

## 2022-06-17 PROCEDURE — 71250 CT THORAX DX C-: CPT

## 2022-06-17 PROCEDURE — 70486 CT MAXILLOFACIAL W/O DYE: CPT | Mod: 26,MH

## 2022-06-17 PROCEDURE — 70486 CT MAXILLOFACIAL W/O DYE: CPT

## 2022-06-19 ENCOUNTER — NON-APPOINTMENT (OUTPATIENT)
Age: 77
End: 2022-06-19

## 2022-06-21 ENCOUNTER — NON-APPOINTMENT (OUTPATIENT)
Age: 77
End: 2022-06-21

## 2022-06-21 DIAGNOSIS — J32.9 CHRONIC SINUSITIS, UNSPECIFIED: ICD-10-CM

## 2022-07-31 ENCOUNTER — OUTPATIENT (OUTPATIENT)
Dept: OUTPATIENT SERVICES | Facility: HOSPITAL | Age: 77
LOS: 1 days | End: 2022-07-31
Payer: MEDICARE

## 2022-07-31 ENCOUNTER — APPOINTMENT (OUTPATIENT)
Dept: MRI IMAGING | Facility: CLINIC | Age: 77
End: 2022-07-31

## 2022-07-31 DIAGNOSIS — Z98.890 OTHER SPECIFIED POSTPROCEDURAL STATES: Chronic | ICD-10-CM

## 2022-07-31 DIAGNOSIS — M50.20 OTHER CERVICAL DISC DISPLACEMENT, UNSPECIFIED CERVICAL REGION: Chronic | ICD-10-CM

## 2022-07-31 DIAGNOSIS — H91.90 UNSPECIFIED HEARING LOSS, UNSPECIFIED EAR: ICD-10-CM

## 2022-07-31 DIAGNOSIS — Z98.49 CATARACT EXTRACTION STATUS, UNSPECIFIED EYE: Chronic | ICD-10-CM

## 2022-07-31 DIAGNOSIS — H90.3 SENSORINEURAL HEARING LOSS, BILATERAL: ICD-10-CM

## 2022-07-31 PROCEDURE — 70553 MRI BRAIN STEM W/O & W/DYE: CPT | Mod: 26,MH

## 2022-07-31 PROCEDURE — 70553 MRI BRAIN STEM W/O & W/DYE: CPT | Mod: MH

## 2022-07-31 PROCEDURE — A9585: CPT

## 2022-08-04 LAB — ACID FAST STN SPT: NORMAL

## 2022-09-07 ENCOUNTER — APPOINTMENT (OUTPATIENT)
Dept: PULMONOLOGY | Facility: CLINIC | Age: 77
End: 2022-09-07

## 2022-09-07 VITALS
OXYGEN SATURATION: 96 % | SYSTOLIC BLOOD PRESSURE: 126 MMHG | HEART RATE: 93 BPM | DIASTOLIC BLOOD PRESSURE: 74 MMHG | RESPIRATION RATE: 16 BRPM

## 2022-09-07 VITALS
SYSTOLIC BLOOD PRESSURE: 126 MMHG | OXYGEN SATURATION: 96 % | BODY MASS INDEX: 35.01 KG/M2 | HEART RATE: 93 BPM | RESPIRATION RATE: 16 BRPM | DIASTOLIC BLOOD PRESSURE: 74 MMHG | WEIGHT: 231 LBS | HEIGHT: 68 IN

## 2022-09-07 DIAGNOSIS — R05.9 COUGH, UNSPECIFIED: ICD-10-CM

## 2022-09-07 DIAGNOSIS — R91.8 OTHER NONSPECIFIC ABNORMAL FINDING OF LUNG FIELD: ICD-10-CM

## 2022-09-07 DIAGNOSIS — G47.33 OBSTRUCTIVE SLEEP APNEA (ADULT) (PEDIATRIC): ICD-10-CM

## 2022-09-07 DIAGNOSIS — R06.00 DYSPNEA, UNSPECIFIED: ICD-10-CM

## 2022-09-07 DIAGNOSIS — E66.9 OBESITY, UNSPECIFIED: ICD-10-CM

## 2022-09-07 DIAGNOSIS — U09.9 DYSPNEA, UNSPECIFIED: ICD-10-CM

## 2022-09-07 PROCEDURE — 99214 OFFICE O/P EST MOD 30 MIN: CPT

## 2022-09-07 RX ORDER — AZITHROMYCIN 250 MG/1
250 TABLET, FILM COATED ORAL
Qty: 1 | Refills: 6 | Status: DISCONTINUED | COMMUNITY
Start: 2022-06-09 | End: 2022-09-07

## 2022-09-07 RX ORDER — CEFUROXIME AXETIL 500 MG/1
500 TABLET ORAL
Qty: 14 | Refills: 3 | Status: DISCONTINUED | COMMUNITY
Start: 2022-06-21 | End: 2022-09-07

## 2022-09-07 NOTE — HISTORY OF PRESENT ILLNESS
[TextBox_4] : has CAD, followed [facility]\par Severe BEV - on autopap 4-20-\par has not worn mask secondary to sputum\par has been on chronic abx every month with discolored sputum\par per pt feels more sinus congestion\par no fevers, had chest pain this am, resolved post bathroom \par has some whitish \par on baby asa\par \par \par 9/7/22\par saw ENT , treated with abx, medrol\par clinically much improved, now on flonase only\par no fever, chill, chest pain\par no sputum\par no sig dyspnea\par has not restarted autopap\par \par \par

## 2022-09-07 NOTE — CONSULT LETTER
[Dear  ___] : Dear  [unfilled], [Consult Letter:] : I had the pleasure of evaluating your patient, [unfilled]. [Please see my note below.] : Please see my note below. [Consult Closing:] : Thank you very much for allowing me to participate in the care of this patient.  If you have any questions, please do not hesitate to contact me. [Sincerely,] : Sincerely, [FreeTextEntry3] : Maldonado Cristobal DO [facility]\par Pulmonary Critical Care\par Director Pulmonary Division\par Medical Director Respiratory Therapy\par [facility]\par \par
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
## 2022-11-17 ENCOUNTER — OFFICE (OUTPATIENT)
Dept: URBAN - METROPOLITAN AREA CLINIC 12 | Facility: CLINIC | Age: 77
Setting detail: OPHTHALMOLOGY
End: 2022-11-17
Payer: MEDICARE

## 2022-11-17 DIAGNOSIS — H01.001: ICD-10-CM

## 2022-11-17 DIAGNOSIS — H35.3131: ICD-10-CM

## 2022-11-17 DIAGNOSIS — H16.223: ICD-10-CM

## 2022-11-17 DIAGNOSIS — H02.532: ICD-10-CM

## 2022-11-17 DIAGNOSIS — H02.831: ICD-10-CM

## 2022-11-17 DIAGNOSIS — H10.89: ICD-10-CM

## 2022-11-17 DIAGNOSIS — H04.222: ICD-10-CM

## 2022-11-17 DIAGNOSIS — H01.004: ICD-10-CM

## 2022-11-17 DIAGNOSIS — H02.403: ICD-10-CM

## 2022-11-17 DIAGNOSIS — H02.834: ICD-10-CM

## 2022-11-17 DIAGNOSIS — H02.535: ICD-10-CM

## 2022-11-17 DIAGNOSIS — H35.033: ICD-10-CM

## 2022-11-17 PROCEDURE — 92014 COMPRE OPH EXAM EST PT 1/>: CPT | Performed by: OPHTHALMOLOGY

## 2022-11-17 PROCEDURE — 92134 CPTRZ OPH DX IMG PST SGM RTA: CPT | Performed by: OPHTHALMOLOGY

## 2022-11-17 ASSESSMENT — CONFRONTATIONAL VISUAL FIELD TEST (CVF)
OS_FINDINGS: FULL
OD_FINDINGS: FULL

## 2022-11-17 ASSESSMENT — LID EXAM ASSESSMENTS
OD_BLEPHARITIS: RUL T
OS_BLEPHARITIS: LUL 1+ 2+

## 2022-11-17 ASSESSMENT — TONOMETRY
OS_IOP_MMHG: 12
OD_IOP_MMHG: 12

## 2022-11-17 ASSESSMENT — SUPERFICIAL PUNCTATE KERATITIS (SPK)
OD_SPK: 1+
OS_SPK: 1+

## 2022-11-22 ASSESSMENT — KERATOMETRY
METHOD_AUTO_MANUAL: AUTO
OD_K1POWER_DIOPTERS: 41.25
OS_K1POWER_DIOPTERS: 41.75
OS_AXISANGLE_DEGREES: 144
OD_K2POWER_DIOPTERS: 41.75
OS_K2POWER_DIOPTERS: 42.25
OD_AXISANGLE_DEGREES: 002

## 2022-11-22 ASSESSMENT — REFRACTION_AUTOREFRACTION
OD_CYLINDER: -0.75
OS_AXIS: 094
OS_CYLINDER: -1.00
OS_SPHERE: +0.25
OD_SPHERE: +0.75
OD_AXIS: 091

## 2022-11-22 ASSESSMENT — AXIALLENGTH_DERIVED
OD_AL: 24.1955
OS_AL: 24.2586
OS_AL: 24.2586
OD_AL: 24.1955

## 2022-11-22 ASSESSMENT — SPHEQUIV_DERIVED
OS_SPHEQUIV: -0.25
OS_SPHEQUIV: -0.25
OD_SPHEQUIV: 0.375
OD_SPHEQUIV: 0.375

## 2022-11-22 ASSESSMENT — REFRACTION_MANIFEST
OD_SPHERE: +0.75
OD_CYLINDER: -0.75
OS_AXIS: 094
OD_AXIS: 090
OS_VA1: 20/25-3
OD_VA1: 20/20
OS_CYLINDER: -1.00
OS_SPHERE: +0.25

## 2022-11-22 ASSESSMENT — VISUAL ACUITY
OS_BCVA: 20/25-
OD_BCVA: 20/30-

## 2022-12-02 NOTE — PATIENT PROFILE ADULT - BILL PAYMENT
no Implemented All Fall Risk Interventions:  [place] to call system. Call bell, personal items and telephone within reach. Instruct patient to call for assistance. Room bathroom lighting operational. Non-slip footwear when patient is off stretcher. Physically safe environment: no spills, clutter or unnecessary equipment. Stretcher in lowest position, wheels locked, appropriate side rails in place. Provide visual cue, wrist band, yellow gown, etc. Monitor gait and stability. Monitor for mental status changes and reorient to person, place, and time. Review medications for side effects contributing to fall risk. Reinforce activity limits and safety measures with patient and family.

## 2022-12-06 ENCOUNTER — OFFICE (OUTPATIENT)
Dept: URBAN - METROPOLITAN AREA CLINIC 100 | Facility: CLINIC | Age: 77
Setting detail: OPHTHALMOLOGY
End: 2022-12-06
Payer: MEDICARE

## 2022-12-06 ENCOUNTER — RX ONLY (RX ONLY)
Age: 77
End: 2022-12-06

## 2022-12-06 DIAGNOSIS — H02.012: ICD-10-CM

## 2022-12-06 DIAGNOSIS — H04.222: ICD-10-CM

## 2022-12-06 DIAGNOSIS — H01.004: ICD-10-CM

## 2022-12-06 DIAGNOSIS — H01.001: ICD-10-CM

## 2022-12-06 DIAGNOSIS — H02.015: ICD-10-CM

## 2022-12-06 PROCEDURE — 99213 OFFICE O/P EST LOW 20 MIN: CPT | Performed by: OPHTHALMOLOGY

## 2022-12-06 ASSESSMENT — REFRACTION_AUTOREFRACTION
OS_SPHERE: +0.25
OD_SPHERE: +0.75
OS_CYLINDER: -1.00
OS_AXIS: 094
OD_AXIS: 091
OD_CYLINDER: -0.75

## 2022-12-06 ASSESSMENT — SUPERFICIAL PUNCTATE KERATITIS (SPK)
OS_SPK: 1+
OD_SPK: 1+

## 2022-12-06 ASSESSMENT — LID EXAM ASSESSMENTS
OS_BLEPHARITIS: LUL 2+
OD_BLEPHARITIS: RUL 2+

## 2022-12-06 ASSESSMENT — KERATOMETRY
METHOD_AUTO_MANUAL: AUTO
OD_K1POWER_DIOPTERS: 41.25
OD_K2POWER_DIOPTERS: 41.75
OS_AXISANGLE_DEGREES: 144
OS_K1POWER_DIOPTERS: 41.75
OS_K2POWER_DIOPTERS: 42.25
OD_AXISANGLE_DEGREES: 002

## 2022-12-06 ASSESSMENT — SPHEQUIV_DERIVED
OD_SPHEQUIV: 0.375
OS_SPHEQUIV: -0.25

## 2022-12-06 ASSESSMENT — AXIALLENGTH_DERIVED
OS_AL: 24.2586
OD_AL: 24.1955

## 2022-12-06 ASSESSMENT — CONFRONTATIONAL VISUAL FIELD TEST (CVF)
OD_FINDINGS: FULL
OS_FINDINGS: FULL

## 2022-12-06 ASSESSMENT — VISUAL ACUITY
OS_BCVA: 20/30-2
OD_BCVA: 20/40-2

## 2022-12-19 NOTE — ED STATDOCS - PROGRESS NOTE DETAILS
75 y/o M pt with hx of known internal hemorrhoids (cauterized in past x 3) and CAD presents to ED c/o multiple episodes of bright red rectal bleeding since 3:00 today.  Patient reports multiple BMs with continued bleeding through out the day. Also reports mild lightheadedness since bleeding began. Takes ASA and CoQ-10 daily. Will be transferred to [facility] for further eval.  GI: Belle
85

## 2023-01-30 ENCOUNTER — OFFICE (OUTPATIENT)
Dept: URBAN - METROPOLITAN AREA CLINIC 93 | Facility: CLINIC | Age: 78
Setting detail: OPHTHALMOLOGY
End: 2023-01-30
Payer: MEDICARE

## 2023-01-30 DIAGNOSIS — H02.012: ICD-10-CM

## 2023-01-30 DIAGNOSIS — H02.015: ICD-10-CM

## 2023-01-30 PROCEDURE — 67825 REVISE EYELASHES: CPT | Performed by: OPHTHALMOLOGY

## 2023-01-30 ASSESSMENT — REFRACTION_AUTOREFRACTION
OD_AXIS: 091
OD_CYLINDER: -0.75
OS_SPHERE: +0.25
OD_SPHERE: +0.75
OS_CYLINDER: -1.00
OS_AXIS: 094

## 2023-01-30 ASSESSMENT — LID EXAM ASSESSMENTS
OD_BLEPHARITIS: RUL 2+
OS_BLEPHARITIS: LUL 2+

## 2023-01-30 ASSESSMENT — KERATOMETRY
OD_AXISANGLE_DEGREES: 002
METHOD_AUTO_MANUAL: AUTO
OD_K1POWER_DIOPTERS: 41.25
OD_K2POWER_DIOPTERS: 41.75
OS_AXISANGLE_DEGREES: 144
OS_K1POWER_DIOPTERS: 41.75
OS_K2POWER_DIOPTERS: 42.25

## 2023-01-30 ASSESSMENT — SUPERFICIAL PUNCTATE KERATITIS (SPK)
OS_SPK: 1+
OD_SPK: 1+

## 2023-01-30 ASSESSMENT — VISUAL ACUITY
OD_BCVA: 20/20-2
OS_BCVA: 20/20-2

## 2023-01-30 ASSESSMENT — CONFRONTATIONAL VISUAL FIELD TEST (CVF)
OS_FINDINGS: FULL
OD_FINDINGS: FULL

## 2023-01-30 ASSESSMENT — AXIALLENGTH_DERIVED
OD_AL: 24.1955
OS_AL: 24.2586

## 2023-01-30 ASSESSMENT — SPHEQUIV_DERIVED
OD_SPHEQUIV: 0.375
OS_SPHEQUIV: -0.25

## 2023-04-11 PROBLEM — H25.13 CATARACT SENILE NUCLEAR SCLEROSIS; BOTH EYES: Status: ACTIVE | Noted: 2023-04-11

## 2023-06-09 NOTE — ASU PATIENT PROFILE, ADULT - MEDICATION ADMINISTRATION INFO, PROFILE
Pt is asking for a refill of lasix. She is scheduled for 7/3. Her kidney fx is elevated. no concerns

## 2023-06-12 ENCOUNTER — OFFICE (OUTPATIENT)
Facility: LOCATION | Age: 78
Setting detail: OPHTHALMOLOGY
End: 2023-06-12
Payer: MEDICARE

## 2023-06-12 DIAGNOSIS — H01.001: ICD-10-CM

## 2023-06-12 DIAGNOSIS — H01.004: ICD-10-CM

## 2023-06-12 DIAGNOSIS — H02.015: ICD-10-CM

## 2023-06-12 PROCEDURE — 99213 OFFICE O/P EST LOW 20 MIN: CPT | Performed by: OPHTHALMOLOGY

## 2023-06-12 ASSESSMENT — SPHEQUIV_DERIVED
OD_SPHEQUIV: 0.375
OS_SPHEQUIV: -0.25

## 2023-06-12 ASSESSMENT — REFRACTION_AUTOREFRACTION
OD_CYLINDER: -0.75
OS_CYLINDER: -1.00
OS_SPHERE: +0.25
OD_SPHERE: +0.75
OD_AXIS: 091
OS_AXIS: 094

## 2023-06-12 ASSESSMENT — KERATOMETRY
OS_AXISANGLE_DEGREES: 144
OS_K1POWER_DIOPTERS: 41.75
OS_K2POWER_DIOPTERS: 42.25
OD_K1POWER_DIOPTERS: 41.25
OD_K2POWER_DIOPTERS: 41.75
METHOD_AUTO_MANUAL: AUTO
OD_AXISANGLE_DEGREES: 002

## 2023-06-12 ASSESSMENT — LID EXAM ASSESSMENTS
OD_BLEPHARITIS: RUL 2+
OS_BLEPHARITIS: LUL 2+

## 2023-06-12 ASSESSMENT — CONFRONTATIONAL VISUAL FIELD TEST (CVF)
OD_FINDINGS: FULL
OS_FINDINGS: FULL

## 2023-06-12 ASSESSMENT — AXIALLENGTH_DERIVED
OS_AL: 24.2586
OD_AL: 24.1955

## 2023-06-12 ASSESSMENT — SUPERFICIAL PUNCTATE KERATITIS (SPK)
OS_SPK: 1+
OD_SPK: 1+

## 2023-06-12 ASSESSMENT — VISUAL ACUITY
OD_BCVA: 20/40+
OS_BCVA: 20/25+2

## 2023-06-27 ENCOUNTER — OFFICE (OUTPATIENT)
Dept: URBAN - METROPOLITAN AREA CLINIC 88 | Facility: CLINIC | Age: 78
Setting detail: OPHTHALMOLOGY
End: 2023-06-27
Payer: MEDICARE

## 2023-06-27 DIAGNOSIS — H02.831: ICD-10-CM

## 2023-06-27 DIAGNOSIS — H01.004: ICD-10-CM

## 2023-06-27 DIAGNOSIS — H35.3131: ICD-10-CM

## 2023-06-27 DIAGNOSIS — H01.001: ICD-10-CM

## 2023-06-27 DIAGNOSIS — H16.223: ICD-10-CM

## 2023-06-27 DIAGNOSIS — H10.89: ICD-10-CM

## 2023-06-27 DIAGNOSIS — H35.033: ICD-10-CM

## 2023-06-27 DIAGNOSIS — H04.222: ICD-10-CM

## 2023-06-27 DIAGNOSIS — H02.015: ICD-10-CM

## 2023-06-27 DIAGNOSIS — H02.834: ICD-10-CM

## 2023-06-27 DIAGNOSIS — H02.403: ICD-10-CM

## 2023-06-27 PROCEDURE — 99213 OFFICE O/P EST LOW 20 MIN: CPT | Performed by: OPTOMETRIST

## 2023-06-27 ASSESSMENT — REFRACTION_AUTOREFRACTION
OS_CYLINDER: -0.75
OS_SPHERE: +0.50
OD_AXIS: 105
OD_CYLINDER: -1.00
OS_AXIS: 096
OD_SPHERE: +0.50

## 2023-06-27 ASSESSMENT — KERATOMETRY
OS_K1POWER_DIOPTERS: 42.00
OS_AXISANGLE_DEGREES: 007
OD_K2POWER_DIOPTERS: 42.25
OS_K2POWER_DIOPTERS: 42.25
OD_K1POWER_DIOPTERS: 41.50
OD_AXISANGLE_DEGREES: 013
METHOD_AUTO_MANUAL: AUTO

## 2023-06-27 ASSESSMENT — VISUAL ACUITY
OD_BCVA: 20/25
OS_BCVA: 20/20

## 2023-06-27 ASSESSMENT — TONOMETRY
OS_IOP_MMHG: 13
OD_IOP_MMHG: 12

## 2023-06-27 ASSESSMENT — SPHEQUIV_DERIVED
OD_SPHEQUIV: 0
OS_SPHEQUIV: 0.125

## 2023-06-27 ASSESSMENT — CONFRONTATIONAL VISUAL FIELD TEST (CVF)
OD_FINDINGS: FULL
OS_FINDINGS: FULL

## 2023-06-27 ASSESSMENT — SUPERFICIAL PUNCTATE KERATITIS (SPK)
OD_SPK: 1+
OS_SPK: 1+

## 2023-06-27 ASSESSMENT — LID EXAM ASSESSMENTS
OS_BLEPHARITIS: LUL 2+ 3+
OD_BLEPHARITIS: RUL 2+ 3+

## 2023-06-27 ASSESSMENT — AXIALLENGTH_DERIVED
OS_AL: 24.0573
OD_AL: 24.2043

## 2023-07-12 NOTE — H&P PST ADULT - CORNEAL ABRASION RISK
dry eyes/daily eye drops [place]z Counseling: I discussed with the patient the risks of ixekizumab including but not limited to immunosuppression, serious infections, worsening of inflammatory bowel disease and drug reactions.  The patient understands that monitoring is required including a PPD at baseline and must alert us or the primary physician if symptoms of infection or other concerning signs are noted.

## 2023-09-06 ENCOUNTER — OFFICE (OUTPATIENT)
Dept: URBAN - METROPOLITAN AREA CLINIC 12 | Facility: CLINIC | Age: 78
Setting detail: OPHTHALMOLOGY
End: 2023-09-06
Payer: MEDICARE

## 2023-09-06 DIAGNOSIS — H01.001: ICD-10-CM

## 2023-09-06 DIAGNOSIS — H02.015: ICD-10-CM

## 2023-09-06 DIAGNOSIS — H01.004: ICD-10-CM

## 2023-09-06 DIAGNOSIS — H16.223: ICD-10-CM

## 2023-09-06 PROCEDURE — 99213 OFFICE O/P EST LOW 20 MIN: CPT | Performed by: OPTOMETRIST

## 2023-09-06 PROCEDURE — 67820 REVISE EYELASHES: CPT | Performed by: OPTOMETRIST

## 2023-09-06 ASSESSMENT — KERATOMETRY
OS_K1POWER_DIOPTERS: 42.00
OD_AXISANGLE_DEGREES: 177
OS_AXISANGLE_DEGREES: 167
METHOD_AUTO_MANUAL: AUTO
OS_K2POWER_DIOPTERS: 42.50
OD_K1POWER_DIOPTERS: 41.75
OD_K2POWER_DIOPTERS: 42.25

## 2023-09-06 ASSESSMENT — LID EXAM ASSESSMENTS
OS_BLEPHARITIS: LUL 2+ 3+
OD_BLEPHARITIS: RUL 2+ 3+

## 2023-09-06 ASSESSMENT — TONOMETRY
OS_IOP_MMHG: 18
OD_IOP_MMHG: 18

## 2023-09-06 ASSESSMENT — SUPERFICIAL PUNCTATE KERATITIS (SPK)
OD_SPK: 1+
OS_SPK: 1+

## 2023-09-06 ASSESSMENT — AXIALLENGTH_DERIVED: OS_AL: 24.1619

## 2023-09-06 ASSESSMENT — REFRACTION_AUTOREFRACTION
OS_AXIS: 081
OS_CYLINDER: -1.00
OD_AXIS: 097
OD_SPHERE: PLANO
OD_CYLINDER: -0.75
OS_SPHERE: +0.25

## 2023-09-06 ASSESSMENT — CONFRONTATIONAL VISUAL FIELD TEST (CVF)
OD_FINDINGS: FULL
OS_FINDINGS: FULL

## 2023-09-06 ASSESSMENT — VISUAL ACUITY
OD_BCVA: 20/50-1
OS_BCVA: 20/25+2

## 2023-09-06 ASSESSMENT — SPHEQUIV_DERIVED: OS_SPHEQUIV: -0.25

## 2023-09-19 ENCOUNTER — OFFICE (OUTPATIENT)
Dept: URBAN - METROPOLITAN AREA CLINIC 100 | Facility: CLINIC | Age: 78
Setting detail: OPHTHALMOLOGY
End: 2023-09-19
Payer: MEDICARE

## 2023-09-19 DIAGNOSIS — H01.001: ICD-10-CM

## 2023-09-19 DIAGNOSIS — H02.015: ICD-10-CM

## 2023-09-19 DIAGNOSIS — H01.004: ICD-10-CM

## 2023-09-19 DIAGNOSIS — H16.223: ICD-10-CM

## 2023-09-19 PROBLEM — H02.012 TRICHIASIS; RIGHT LOWER LID, LEFT LOWER LID: Status: ACTIVE | Noted: 2023-09-19

## 2023-09-19 PROCEDURE — 99213 OFFICE O/P EST LOW 20 MIN: CPT | Performed by: OPHTHALMOLOGY

## 2023-09-19 ASSESSMENT — KERATOMETRY
OS_AXISANGLE_DEGREES: 167
OS_K1POWER_DIOPTERS: 42.00
OD_K1POWER_DIOPTERS: 41.75
METHOD_AUTO_MANUAL: AUTO
OD_AXISANGLE_DEGREES: 177
OS_K2POWER_DIOPTERS: 42.50
OD_K2POWER_DIOPTERS: 42.25

## 2023-09-19 ASSESSMENT — LID EXAM ASSESSMENTS
OD_BLEPHARITIS: RUL 2+ 3+
OS_BLEPHARITIS: LUL 2+ 3+

## 2023-09-19 ASSESSMENT — VISUAL ACUITY
OD_BCVA: 20/30-1
OS_BCVA: 20/25-2

## 2023-09-19 ASSESSMENT — REFRACTION_AUTOREFRACTION
OS_SPHERE: +0.25
OS_AXIS: 081
OD_CYLINDER: -0.75
OD_SPHERE: PLANO
OD_AXIS: 097
OS_CYLINDER: -1.00

## 2023-09-19 ASSESSMENT — CONFRONTATIONAL VISUAL FIELD TEST (CVF)
OS_FINDINGS: FULL
OD_FINDINGS: FULL

## 2023-09-19 ASSESSMENT — SUPERFICIAL PUNCTATE KERATITIS (SPK)
OD_SPK: 1+
OS_SPK: 1+

## 2023-09-19 ASSESSMENT — AXIALLENGTH_DERIVED: OS_AL: 24.1619

## 2023-09-19 ASSESSMENT — SPHEQUIV_DERIVED: OS_SPHEQUIV: -0.25

## 2023-11-17 ENCOUNTER — RX ONLY (RX ONLY)
Age: 78
End: 2023-11-17

## 2023-11-17 ENCOUNTER — OFFICE (OUTPATIENT)
Dept: URBAN - METROPOLITAN AREA CLINIC 115 | Facility: CLINIC | Age: 78
Setting detail: OPHTHALMOLOGY
End: 2023-11-17
Payer: MEDICARE

## 2023-11-17 DIAGNOSIS — H02.012: ICD-10-CM

## 2023-11-17 DIAGNOSIS — H02.015: ICD-10-CM

## 2023-11-17 PROCEDURE — 99212 OFFICE O/P EST SF 10 MIN: CPT | Performed by: OPTOMETRIST

## 2023-11-17 ASSESSMENT — LID EXAM ASSESSMENTS
OS_TRICHIASIS: LLL
OS_BLEPHARITIS: LUL 2+ 3+
OD_TRICHIASIS: RLL
OD_BLEPHARITIS: RUL 2+ 3+

## 2023-11-17 ASSESSMENT — CONFRONTATIONAL VISUAL FIELD TEST (CVF)
OD_FINDINGS: FULL
OS_FINDINGS: FULL

## 2023-11-17 ASSESSMENT — SPHEQUIV_DERIVED: OS_SPHEQUIV: -0.25

## 2023-11-17 ASSESSMENT — REFRACTION_AUTOREFRACTION
OS_SPHERE: +0.25
OS_CYLINDER: -1.00
OD_CYLINDER: -0.75
OS_AXIS: 081
OD_AXIS: 097
OD_SPHERE: PLANO

## 2023-11-17 ASSESSMENT — SUPERFICIAL PUNCTATE KERATITIS (SPK)
OD_SPK: 1+
OS_SPK: 1+

## 2023-12-11 ENCOUNTER — OFFICE (OUTPATIENT)
Dept: URBAN - METROPOLITAN AREA CLINIC 12 | Facility: CLINIC | Age: 78
Setting detail: OPHTHALMOLOGY
End: 2023-12-11
Payer: MEDICARE

## 2023-12-11 DIAGNOSIS — H02.015: ICD-10-CM

## 2023-12-11 DIAGNOSIS — H02.014: ICD-10-CM

## 2023-12-11 DIAGNOSIS — H02.012: ICD-10-CM

## 2023-12-11 DIAGNOSIS — H02.011: ICD-10-CM

## 2023-12-11 PROCEDURE — 92014 COMPRE OPH EXAM EST PT 1/>: CPT | Performed by: OPTOMETRIST

## 2023-12-11 PROCEDURE — 67820 REVISE EYELASHES: CPT | Mod: E1,E2,E3,E4 | Performed by: OPTOMETRIST

## 2023-12-11 ASSESSMENT — LID EXAM ASSESSMENTS
OS_BLEPHARITIS: LUL 2+ 3+
OS_TRICHIASIS: LLL
OD_TRICHIASIS: RLL
OD_BLEPHARITIS: RUL 2+ 3+

## 2023-12-11 ASSESSMENT — REFRACTION_AUTOREFRACTION
OD_AXIS: 097
OS_AXIS: 081
OS_CYLINDER: -1.00
OD_SPHERE: +0.75
OS_SPHERE: +0.50
OD_CYLINDER: -1.00

## 2023-12-11 ASSESSMENT — CONFRONTATIONAL VISUAL FIELD TEST (CVF)
OS_FINDINGS: FULL
OD_FINDINGS: FULL

## 2023-12-11 ASSESSMENT — SUPERFICIAL PUNCTATE KERATITIS (SPK)
OD_SPK: 1+
OS_SPK: 1+

## 2023-12-11 ASSESSMENT — SPHEQUIV_DERIVED
OS_SPHEQUIV: 0
OD_SPHEQUIV: 0.25

## 2024-01-23 ENCOUNTER — OUTPATIENT (OUTPATIENT)
Dept: OUTPATIENT SERVICES | Facility: HOSPITAL | Age: 79
LOS: 1 days | End: 2024-01-23
Payer: MEDICARE

## 2024-01-23 ENCOUNTER — APPOINTMENT (OUTPATIENT)
Dept: CT IMAGING | Facility: CLINIC | Age: 79
End: 2024-01-23
Payer: MEDICARE

## 2024-01-23 DIAGNOSIS — Z98.49 CATARACT EXTRACTION STATUS, UNSPECIFIED EYE: Chronic | ICD-10-CM

## 2024-01-23 DIAGNOSIS — Z98.890 OTHER SPECIFIED POSTPROCEDURAL STATES: Chronic | ICD-10-CM

## 2024-01-23 DIAGNOSIS — M50.20 OTHER CERVICAL DISC DISPLACEMENT, UNSPECIFIED CERVICAL REGION: Chronic | ICD-10-CM

## 2024-01-23 DIAGNOSIS — Z00.8 ENCOUNTER FOR OTHER GENERAL EXAMINATION: ICD-10-CM

## 2024-01-23 PROCEDURE — 70486 CT MAXILLOFACIAL W/O DYE: CPT | Mod: MH

## 2024-01-23 PROCEDURE — 70486 CT MAXILLOFACIAL W/O DYE: CPT | Mod: 26,MH

## 2024-01-25 ENCOUNTER — OFFICE (OUTPATIENT)
Dept: URBAN - METROPOLITAN AREA CLINIC 115 | Facility: CLINIC | Age: 79
Setting detail: OPHTHALMOLOGY
End: 2024-01-25
Payer: MEDICARE

## 2024-01-25 DIAGNOSIS — H02.012: ICD-10-CM

## 2024-01-25 DIAGNOSIS — H01.004: ICD-10-CM

## 2024-01-25 DIAGNOSIS — H01.001: ICD-10-CM

## 2024-01-25 DIAGNOSIS — H02.015: ICD-10-CM

## 2024-01-25 PROCEDURE — 99213 OFFICE O/P EST LOW 20 MIN: CPT | Performed by: OPTOMETRIST

## 2024-01-25 ASSESSMENT — LID EXAM ASSESSMENTS
OD_TRICHIASIS: RLL
OS_TRICHIASIS: LLL
OD_BLEPHARITIS: RUL 2+ 3+
OS_BLEPHARITIS: LUL 2+ 3+

## 2024-01-25 ASSESSMENT — CONFRONTATIONAL VISUAL FIELD TEST (CVF)
OD_FINDINGS: FULL
OS_FINDINGS: FULL

## 2024-01-25 ASSESSMENT — SUPERFICIAL PUNCTATE KERATITIS (SPK)
OS_SPK: 1+
OD_SPK: 1+

## 2024-01-25 ASSESSMENT — SPHEQUIV_DERIVED
OD_SPHEQUIV: 0.25
OS_SPHEQUIV: 0

## 2024-01-25 ASSESSMENT — REFRACTION_AUTOREFRACTION
OS_SPHERE: +0.50
OS_CYLINDER: -1.00
OD_AXIS: 097
OD_CYLINDER: -1.00
OS_AXIS: 081
OD_SPHERE: +0.75

## 2024-02-07 PROBLEM — H53.421 SCOTOMA BLIND SPOT; RIGHT EYE: Status: ACTIVE | Noted: 2024-02-07

## 2024-03-13 NOTE — DISCHARGE NOTE ADULT - CARE PROVIDERS DIRECT ADDRESSES
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    Deedee Phase I: Deedee Score: 9    Deedee Phase II:      Anesthesia Post Evaluation    Patient location during evaluation: PACU  Patient participation: complete - patient participated  Level of consciousness: awake and alert  Airway patency: patent  Nausea & Vomiting: no nausea  Cardiovascular status: hemodynamically stable  Respiratory status: acceptable  Hydration status: euvolemic  Multimodal analgesia pain management approach  Pain management: adequate        No notable events documented.   ,DirectAddress_Unknown

## 2024-03-22 ENCOUNTER — OFFICE (OUTPATIENT)
Dept: URBAN - METROPOLITAN AREA CLINIC 115 | Facility: CLINIC | Age: 79
Setting detail: OPHTHALMOLOGY
End: 2024-03-22
Payer: MEDICARE

## 2024-03-22 DIAGNOSIS — H04.222: ICD-10-CM

## 2024-03-22 DIAGNOSIS — H02.403: ICD-10-CM

## 2024-03-22 DIAGNOSIS — H01.001: ICD-10-CM

## 2024-03-22 DIAGNOSIS — H01.004: ICD-10-CM

## 2024-03-22 DIAGNOSIS — H02.014: ICD-10-CM

## 2024-03-22 DIAGNOSIS — H02.011: ICD-10-CM

## 2024-03-22 DIAGNOSIS — H02.012: ICD-10-CM

## 2024-03-22 DIAGNOSIS — H16.223: ICD-10-CM

## 2024-03-22 DIAGNOSIS — H02.015: ICD-10-CM

## 2024-03-22 PROCEDURE — 99213 OFFICE O/P EST LOW 20 MIN: CPT | Performed by: OPTOMETRIST

## 2024-03-22 ASSESSMENT — LID EXAM ASSESSMENTS
OD_TRICHIASIS: RLL RUL
OS_TRICHIASIS: LLL LUL
OD_BLEPHARITIS: RUL 2+ 3+
OS_BLEPHARITIS: LUL 2+ 3+

## 2024-03-28 ENCOUNTER — APPOINTMENT (OUTPATIENT)
Dept: PULMONOLOGY | Facility: CLINIC | Age: 79
End: 2024-03-28

## 2024-03-28 ENCOUNTER — OFFICE (OUTPATIENT)
Dept: URBAN - METROPOLITAN AREA CLINIC 115 | Facility: CLINIC | Age: 79
Setting detail: OPHTHALMOLOGY
End: 2024-03-28
Payer: MEDICARE

## 2024-03-28 DIAGNOSIS — H02.011: ICD-10-CM

## 2024-03-28 DIAGNOSIS — H02.012: ICD-10-CM

## 2024-03-28 PROCEDURE — 92012 INTRM OPH EXAM EST PATIENT: CPT | Performed by: OPTOMETRIST

## 2024-03-28 ASSESSMENT — LID EXAM ASSESSMENTS
OS_TRICHIASIS: LLL LUL
OD_BLEPHARITIS: RUL 2+ 3+
OD_TRICHIASIS: RLL RUL
OS_BLEPHARITIS: LUL 2+ 3+

## 2024-04-08 ENCOUNTER — OFFICE (OUTPATIENT)
Facility: LOCATION | Age: 79
Setting detail: OPHTHALMOLOGY
End: 2024-04-08
Payer: MEDICARE

## 2024-04-08 DIAGNOSIS — H02.012: ICD-10-CM

## 2024-04-08 DIAGNOSIS — H02.015: ICD-10-CM

## 2024-04-08 DIAGNOSIS — H02.011: ICD-10-CM

## 2024-04-08 PROCEDURE — 99213 OFFICE O/P EST LOW 20 MIN: CPT | Performed by: OPHTHALMOLOGY

## 2024-04-08 ASSESSMENT — LID EXAM ASSESSMENTS
OS_TRICHIASIS: LLL LUL
OD_BLEPHARITIS: RUL 2+ 3+
OD_TRICHIASIS: RLL RUL
OS_BLEPHARITIS: LUL 2+ 3+

## 2024-05-13 ENCOUNTER — OFFICE (OUTPATIENT)
Facility: LOCATION | Age: 79
Setting detail: OPHTHALMOLOGY
End: 2024-05-13
Payer: MEDICARE

## 2024-05-13 DIAGNOSIS — H02.011: ICD-10-CM

## 2024-05-13 DIAGNOSIS — H02.015: ICD-10-CM

## 2024-05-13 DIAGNOSIS — H02.012: ICD-10-CM

## 2024-05-13 PROCEDURE — 67825 REVISE EYELASHES: CPT | Mod: 50 | Performed by: OPHTHALMOLOGY

## 2024-05-13 ASSESSMENT — LID EXAM ASSESSMENTS
OD_BLEPHARITIS: RUL 2+ 3+
OS_BLEPHARITIS: LUL 2+ 3+
OD_TRICHIASIS: RLL RUL
OS_TRICHIASIS: LLL LUL

## 2024-05-13 ASSESSMENT — CONFRONTATIONAL VISUAL FIELD TEST (CVF)
OD_FINDINGS: FULL
OS_FINDINGS: FULL

## 2024-05-28 ENCOUNTER — EMERGENCY (EMERGENCY)
Facility: HOSPITAL | Age: 79
LOS: 1 days | Discharge: HOME | End: 2024-05-28
Attending: EMERGENCY MEDICINE
Payer: SELF-PAY

## 2024-05-28 VITALS
TEMPERATURE: 98 F | HEIGHT: 68 IN | DIASTOLIC BLOOD PRESSURE: 72 MMHG | WEIGHT: 226.64 LBS | RESPIRATION RATE: 16 BRPM | HEART RATE: 100 BPM | SYSTOLIC BLOOD PRESSURE: 107 MMHG | OXYGEN SATURATION: 95 %

## 2024-05-28 DIAGNOSIS — Z98.890 OTHER SPECIFIED POSTPROCEDURAL STATES: Chronic | ICD-10-CM

## 2024-05-28 DIAGNOSIS — M50.20 OTHER CERVICAL DISC DISPLACEMENT, UNSPECIFIED CERVICAL REGION: Chronic | ICD-10-CM

## 2024-05-28 DIAGNOSIS — Z98.49 CATARACT EXTRACTION STATUS, UNSPECIFIED EYE: Chronic | ICD-10-CM

## 2024-05-28 PROCEDURE — 72125 CT NECK SPINE W/O DYE: CPT | Mod: 26,MC

## 2024-05-28 PROCEDURE — 72131 CT LUMBAR SPINE W/O DYE: CPT | Mod: 26,MC

## 2024-05-28 PROCEDURE — 99284 EMERGENCY DEPT VISIT MOD MDM: CPT

## 2024-05-28 PROCEDURE — 70450 CT HEAD/BRAIN W/O DYE: CPT | Mod: 26,MC

## 2024-05-28 RX ORDER — METHOCARBAMOL 500 MG/1
1 TABLET, FILM COATED ORAL
Qty: 15 | Refills: 0
Start: 2024-05-28

## 2024-05-28 RX ORDER — ACETAMINOPHEN 500 MG
650 TABLET ORAL ONCE
Refills: 0 | Status: COMPLETED | OUTPATIENT
Start: 2024-05-28 | End: 2024-05-28

## 2024-05-28 RX ORDER — LIDOCAINE 4 G/100G
1 CREAM TOPICAL ONCE
Refills: 0 | Status: COMPLETED | OUTPATIENT
Start: 2024-05-28 | End: 2024-05-28

## 2024-05-28 RX ADMIN — Medication 650 MILLIGRAM(S): at 17:28

## 2024-05-28 RX ADMIN — LIDOCAINE 1 PATCH: 4 CREAM TOPICAL at 17:28

## 2024-05-28 NOTE — ED ADULT NURSE NOTE - NS_NURSE_DISC_TEACHING_YN_ED_ALL_ED
Patient called to renew her prescription of drospirenone-ethinyl estradiol (LORYNA) 3-0.02 MG per tablet. Patient scheduled her CPE on the next available appointment on 4/18/19, and is currently on the wait list for earlier availability. Pharmacy attached.   No

## 2024-05-28 NOTE — ED PROVIDER NOTE - PROGRESS NOTE DETAILS
Jyotsna Cosme PA : pt signed out to me to f/u ct results, no fx, results dw pt and family will dc outpt spine and pcp f/u, return precautions
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
## 2024-05-28 NOTE — ED PROVIDER NOTE - PATIENT PORTAL LINK FT
You can access the FollowMyHealth Patient Portal offered by [facility] by registering at the following website: [email or web address]. By joining [facility]’s FollowMyHealth portal, you will also be able to view your health information using other applications (apps) compatible with our system.

## 2024-05-28 NOTE — ED PROVIDER NOTE - PHYSICAL EXAMINATION
Constitutional - well-developed; well nourished. Head - NCAT. Airway patent. Eyes - PERRL. CV - RRR. no murmur. no edema. Pulm - CTAB. Abd - soft, nt. no rebound. no guarding. Neuro - A&Ox3. strength 5/5 x4. sensation intact x4. normal gait. Skin - No rash. MSK - normal ROM, +right sided paraspinal tenderness to cervical muscles and right lumbar paraspinal muscles, no midline tenderness, able to straight leg raise.
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
## 2024-05-28 NOTE — ED ADULT NURSE NOTE - NSFALLUNIVINTERV_ED_ALL_ED
Bed/Stretcher in lowest position, wheels locked, appropriate side rails in place/Call bell, personal items and telephone in reach/Instruct patient to call for assistance before getting out of bed/chair/stretcher/Non-slip footwear applied when patient is off stretcher/[place] to call system/Physically safe environment - no spills, clutter or unnecessary equipment/Purposeful proactive rounding/Room/bathroom lighting operational, light cord in reach

## 2024-05-28 NOTE — ED PROVIDER NOTE - CARE PROVIDER_API CALL
Con Gutierrez  Neurosurgery  [address] [place] [id number]  Phone: [phone]  Fax: [phone]  Follow Up Time:

## 2024-06-10 ENCOUNTER — OFFICE (OUTPATIENT)
Facility: LOCATION | Age: 79
Setting detail: OPHTHALMOLOGY
End: 2024-06-10
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
## 2024-06-10 PROCEDURE — NO SHOW FE NO SHOW FEE: Performed by: OPHTHALMOLOGY

## 2024-06-17 ENCOUNTER — OFFICE (OUTPATIENT)
Dept: URBAN - METROPOLITAN AREA CLINIC 113 | Facility: CLINIC | Age: 79
Setting detail: OPHTHALMOLOGY
End: 2024-06-17
Payer: MEDICARE

## 2024-06-17 DIAGNOSIS — H02.012: ICD-10-CM

## 2024-06-17 DIAGNOSIS — H02.015: ICD-10-CM

## 2024-06-17 PROCEDURE — 67820 REVISE EYELASHES: CPT | Mod: 50 | Performed by: STUDENT IN AN ORGANIZED HEALTH CARE EDUCATION/TRAINING PROGRAM

## 2024-06-17 PROCEDURE — 99213 OFFICE O/P EST LOW 20 MIN: CPT | Performed by: STUDENT IN AN ORGANIZED HEALTH CARE EDUCATION/TRAINING PROGRAM

## 2024-06-17 ASSESSMENT — CONFRONTATIONAL VISUAL FIELD TEST (CVF)
OS_FINDINGS: FULL
OD_FINDINGS: FULL

## 2024-06-17 ASSESSMENT — LID EXAM ASSESSMENTS
OD_BLEPHARITIS: RUL 2+ 3+
OS_BLEPHARITIS: LUL 2+ 3+
OD_TRICHIASIS: RLL 2+
OS_TRICHIASIS: LLL 1+

## 2024-06-29 PROCEDURE — 72131 CT LUMBAR SPINE W/O DYE: CPT | Mod: MC

## 2024-06-29 PROCEDURE — 70450 CT HEAD/BRAIN W/O DYE: CPT | Mod: MC

## 2024-06-29 PROCEDURE — 72125 CT NECK SPINE W/O DYE: CPT | Mod: MC

## 2024-06-29 PROCEDURE — 99284 EMERGENCY DEPT VISIT MOD MDM: CPT | Mod: 25

## 2024-07-08 ENCOUNTER — OFFICE (OUTPATIENT)
Facility: LOCATION | Age: 79
Setting detail: OPHTHALMOLOGY
End: 2024-07-08
Payer: MEDICARE

## 2024-07-08 DIAGNOSIS — H16.223: ICD-10-CM

## 2024-07-08 DIAGNOSIS — H02.012: ICD-10-CM

## 2024-07-08 DIAGNOSIS — H02.015: ICD-10-CM

## 2024-07-08 PROCEDURE — 99213 OFFICE O/P EST LOW 20 MIN: CPT | Performed by: OPHTHALMOLOGY

## 2024-07-08 ASSESSMENT — LID EXAM ASSESSMENTS
OS_TRICHIASIS: LLL T
OS_BLEPHARITIS: LUL 2+ 3+
OD_TRICHIASIS: RLL T
OD_BLEPHARITIS: RUL 2+ 3+

## 2024-07-08 ASSESSMENT — CONFRONTATIONAL VISUAL FIELD TEST (CVF)
OD_FINDINGS: FULL
OS_FINDINGS: FULL

## 2024-07-24 ENCOUNTER — OFFICE (OUTPATIENT)
Dept: URBAN - METROPOLITAN AREA CLINIC 115 | Facility: CLINIC | Age: 79
Setting detail: OPHTHALMOLOGY
End: 2024-07-24
Payer: MEDICARE

## 2024-07-24 DIAGNOSIS — H02.015: ICD-10-CM

## 2024-07-24 DIAGNOSIS — H02.012: ICD-10-CM

## 2024-07-24 PROCEDURE — 99212 OFFICE O/P EST SF 10 MIN: CPT | Performed by: OPTOMETRIST

## 2024-07-24 ASSESSMENT — LID EXAM ASSESSMENTS
OD_TRICHIASIS: RLL T
OD_BLEPHARITIS: RUL 2+ 3+
OS_BLEPHARITIS: LUL 2+ 3+
OS_TRICHIASIS: LLL T

## 2024-07-24 ASSESSMENT — CONFRONTATIONAL VISUAL FIELD TEST (CVF)
OS_FINDINGS: FULL
OD_FINDINGS: FULL

## 2024-07-27 ENCOUNTER — OFFICE (OUTPATIENT)
Dept: URBAN - METROPOLITAN AREA CLINIC 12 | Facility: CLINIC | Age: 79
Setting detail: OPHTHALMOLOGY
End: 2024-07-27
Payer: MEDICARE

## 2024-07-27 ENCOUNTER — RX ONLY (RX ONLY)
Age: 79
End: 2024-07-27

## 2024-07-27 DIAGNOSIS — H01.001: ICD-10-CM

## 2024-07-27 DIAGNOSIS — H01.004: ICD-10-CM

## 2024-07-27 PROBLEM — B88.0 ACARIASIS, INFESTATION OF MITES AND OR TICS ; BOTH EYES: Status: ACTIVE | Noted: 2024-07-24

## 2024-07-27 PROCEDURE — 92012 INTRM OPH EXAM EST PATIENT: CPT | Performed by: OPTOMETRIST

## 2024-07-27 ASSESSMENT — CONFRONTATIONAL VISUAL FIELD TEST (CVF)
OD_FINDINGS: FULL
OS_FINDINGS: FULL

## 2024-07-27 ASSESSMENT — LID EXAM ASSESSMENTS
OD_BLEPHARITIS: RUL 2+ 3+
OS_BLEPHARITIS: LUL 2+ 3+
OD_TRICHIASIS: RLL T
OS_TRICHIASIS: LLL T

## 2024-08-21 NOTE — ED PROVIDER NOTE - NS ED MD DISPO ADMITTING SERVICE
Follow up tomorrow 8/22/2024 at [facility] @ 7:45 AM - [id number] [address]     Keep patch and shield on eye until follow up tomorrow. No drops in OPERATIVE eye today, continue any prior drops into NONOPERATIVE eye as normally done.     No water directly into operative eye. No bending, lifting, heavy straining.    
MED

## 2024-09-07 ENCOUNTER — OFFICE (OUTPATIENT)
Dept: URBAN - METROPOLITAN AREA CLINIC 12 | Facility: CLINIC | Age: 79
Setting detail: OPHTHALMOLOGY
End: 2024-09-07
Payer: MEDICARE

## 2024-09-07 DIAGNOSIS — H02.012: ICD-10-CM

## 2024-09-07 DIAGNOSIS — H35.3131: ICD-10-CM

## 2024-09-07 DIAGNOSIS — H01.001: ICD-10-CM

## 2024-09-07 DIAGNOSIS — H01.004: ICD-10-CM

## 2024-09-07 PROBLEM — H43.393 VITREOUS FLOATERS; BOTH EYES: Status: ACTIVE | Noted: 2024-09-07

## 2024-09-07 PROBLEM — H35.373 EPIRETINAL MEMBRANE; BOTH EYES: Status: ACTIVE | Noted: 2024-09-07

## 2024-09-07 PROBLEM — H35.40 PERIPAPILLARY ATROPHY ; BOTH EYES: Status: ACTIVE | Noted: 2024-09-07

## 2024-09-07 PROCEDURE — 92014 COMPRE OPH EXAM EST PT 1/>: CPT | Performed by: OPTOMETRIST

## 2024-09-07 PROCEDURE — 92250 FUNDUS PHOTOGRAPHY W/I&R: CPT | Performed by: OPTOMETRIST

## 2024-09-07 ASSESSMENT — LID EXAM ASSESSMENTS
OS_TRICHIASIS: LLL T
OS_BLEPHARITIS: LUL 3+
OD_TRICHIASIS: RLL 1+
OD_BLEPHARITIS: RUL 3+

## 2024-09-07 ASSESSMENT — CONFRONTATIONAL VISUAL FIELD TEST (CVF)
OS_FINDINGS: FULL
OD_FINDINGS: FULL

## 2024-09-11 NOTE — PATIENT PROFILE ADULT - HAS THE PATIENT BEEN ADMITTED FROM SHORT TERM REHAB?
Per patient he is going to follow up with his recommendation of employer. [facility] offered [facility] health. At this this time he is not interested. He will follow up with whom he is referred to. No further action is needed by [facility].     Alena Rushing RN, BSN  [facility]   Work Remotely T: [phone], F: [phone]      
no

## 2024-10-07 ENCOUNTER — OFFICE (OUTPATIENT)
Dept: URBAN - METROPOLITAN AREA CLINIC 12 | Facility: CLINIC | Age: 79
Setting detail: OPHTHALMOLOGY
End: 2024-10-07
Payer: MEDICARE

## 2024-10-07 DIAGNOSIS — H02.012: ICD-10-CM

## 2024-10-07 DIAGNOSIS — H01.004: ICD-10-CM

## 2024-10-07 DIAGNOSIS — H01.001: ICD-10-CM

## 2024-10-07 DIAGNOSIS — H02.015: ICD-10-CM

## 2024-10-07 PROCEDURE — 99213 OFFICE O/P EST LOW 20 MIN: CPT | Performed by: STUDENT IN AN ORGANIZED HEALTH CARE EDUCATION/TRAINING PROGRAM

## 2024-10-07 ASSESSMENT — LID EXAM ASSESSMENTS
OS_TRICHIASIS: LLL T
OS_BLEPHARITIS: LUL 3+
OD_TRICHIASIS: RLL 1+
OD_BLEPHARITIS: RUL 3+

## 2024-10-07 ASSESSMENT — SUPERFICIAL PUNCTATE KERATITIS (SPK)
OD_SPK: 1+
OS_SPK: 2+

## 2024-10-07 ASSESSMENT — CONFRONTATIONAL VISUAL FIELD TEST (CVF)
OD_FINDINGS: FULL
OS_FINDINGS: FULL

## 2024-10-09 ASSESSMENT — KERATOMETRY
OS_K2POWER_DIOPTERS: 42.50
OD_K1POWER_DIOPTERS: 41.50
METHOD_AUTO_MANUAL: AUTO
OS_K1POWER_DIOPTERS: 42.00
OS_AXISANGLE_DEGREES: 176
OD_K2POWER_DIOPTERS: 42.50
OD_AXISANGLE_DEGREES: 089

## 2024-10-09 ASSESSMENT — REFRACTION_AUTOREFRACTION
OD_CYLINDER: -1.00
OS_CYLINDER: -0.75
OD_SPHERE: +1.25
OD_AXIS: 086
OS_AXIS: 089
OS_SPHERE: +0.25

## 2024-10-09 ASSESSMENT — VISUAL ACUITY
OS_BCVA: 20/20
OD_BCVA: 20/30+

## 2024-10-12 ENCOUNTER — OFFICE (OUTPATIENT)
Dept: URBAN - METROPOLITAN AREA CLINIC 100 | Facility: CLINIC | Age: 79
Setting detail: OPHTHALMOLOGY
End: 2024-10-12
Payer: MEDICARE

## 2024-10-12 ENCOUNTER — RX ONLY (RX ONLY)
Age: 79
End: 2024-10-12

## 2024-10-12 DIAGNOSIS — H02.015: ICD-10-CM

## 2024-10-12 DIAGNOSIS — H01.001: ICD-10-CM

## 2024-10-12 DIAGNOSIS — H01.004: ICD-10-CM

## 2024-10-12 DIAGNOSIS — H02.012: ICD-10-CM

## 2024-10-12 PROCEDURE — 99213 OFFICE O/P EST LOW 20 MIN: CPT | Performed by: STUDENT IN AN ORGANIZED HEALTH CARE EDUCATION/TRAINING PROGRAM

## 2024-10-12 ASSESSMENT — KERATOMETRY
OD_K1POWER_DIOPTERS: 41.50
OS_AXISANGLE_DEGREES: 176
OD_AXISANGLE_DEGREES: 089
METHOD_AUTO_MANUAL: AUTO
OS_K2POWER_DIOPTERS: 42.50
OD_K2POWER_DIOPTERS: 42.50
OS_K1POWER_DIOPTERS: 42.00

## 2024-10-12 ASSESSMENT — CONFRONTATIONAL VISUAL FIELD TEST (CVF)
OS_FINDINGS: FULL
OD_FINDINGS: FULL

## 2024-10-12 ASSESSMENT — REFRACTION_AUTOREFRACTION
OS_CYLINDER: -0.75
OS_SPHERE: +0.25
OS_AXIS: 089
OD_AXIS: 086
OD_CYLINDER: -1.00
OD_SPHERE: +1.25

## 2024-10-12 ASSESSMENT — SUPERFICIAL PUNCTATE KERATITIS (SPK)
OS_SPK: 2+
OD_SPK: 1+

## 2024-10-12 ASSESSMENT — VISUAL ACUITY
OS_BCVA: 20/20
OD_BCVA: 20/25-2

## 2024-10-12 ASSESSMENT — LID EXAM ASSESSMENTS
OD_BLEPHARITIS: RUL 3+
OS_BLEPHARITIS: LUL 3+
OS_TRICHIASIS: LLL T
OD_TRICHIASIS: RLL 1+
OD_COMMENTS: LATERAL RETRACTION MEDIAL RETRACTION LOWER LID POSITIO
OS_COMMENTS: LATERAL RETRACTION MEDIAL RETRACTION LOWER LID POSITIO
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
## 2024-10-22 ENCOUNTER — OFFICE (OUTPATIENT)
Dept: URBAN - METROPOLITAN AREA CLINIC 100 | Facility: CLINIC | Age: 79
Setting detail: OPHTHALMOLOGY
End: 2024-10-22
Payer: MEDICARE

## 2024-10-22 DIAGNOSIS — H01.004: ICD-10-CM

## 2024-10-22 DIAGNOSIS — H02.015: ICD-10-CM

## 2024-10-22 DIAGNOSIS — H02.012: ICD-10-CM

## 2024-10-22 DIAGNOSIS — H01.001: ICD-10-CM

## 2024-10-22 PROCEDURE — 99213 OFFICE O/P EST LOW 20 MIN: CPT | Performed by: OPHTHALMOLOGY

## 2024-10-22 ASSESSMENT — KERATOMETRY
OS_K1POWER_DIOPTERS: 42.00
OS_AXISANGLE_DEGREES: 176
OD_K2POWER_DIOPTERS: 42.50
OD_K1POWER_DIOPTERS: 41.50
METHOD_AUTO_MANUAL: AUTO
OD_AXISANGLE_DEGREES: 089
OS_K2POWER_DIOPTERS: 42.50

## 2024-10-22 ASSESSMENT — LID EXAM ASSESSMENTS
OS_COMMENTS: LATERAL RETRACTION MEDIAL RETRACTION LOWER LID POSITIO
OD_TRICHIASIS: RLL 1+
OD_COMMENTS: LATERAL RETRACTION MEDIAL RETRACTION LOWER LID POSITIO
OD_BLEPHARITIS: RUL 3+
OS_BLEPHARITIS: LUL 3+
OS_TRICHIASIS: LLL T

## 2024-10-22 ASSESSMENT — VISUAL ACUITY
OD_BCVA: 20/25
OS_BCVA: 20/20

## 2024-10-22 ASSESSMENT — REFRACTION_AUTOREFRACTION
OS_SPHERE: +0.25
OD_AXIS: 086
OD_SPHERE: +1.25
OS_CYLINDER: -0.75
OS_AXIS: 089
OD_CYLINDER: -1.00

## 2024-10-22 ASSESSMENT — SUPERFICIAL PUNCTATE KERATITIS (SPK)
OS_SPK: 2+
OD_SPK: 1+

## 2024-11-25 ENCOUNTER — OFFICE (OUTPATIENT)
Dept: URBAN - METROPOLITAN AREA CLINIC 38 | Facility: CLINIC | Age: 79
Setting detail: OPHTHALMOLOGY
End: 2024-11-25
Payer: MEDICARE

## 2024-11-25 ENCOUNTER — RX ONLY (RX ONLY)
Age: 79
End: 2024-11-25

## 2024-11-25 DIAGNOSIS — H02.012: ICD-10-CM

## 2024-11-25 DIAGNOSIS — H01.004: ICD-10-CM

## 2024-11-25 DIAGNOSIS — H01.001: ICD-10-CM

## 2024-11-25 DIAGNOSIS — H02.015: ICD-10-CM

## 2024-11-25 DIAGNOSIS — B88.0: ICD-10-CM

## 2024-11-25 PROCEDURE — 99213 OFFICE O/P EST LOW 20 MIN: CPT | Mod: 25 | Performed by: STUDENT IN AN ORGANIZED HEALTH CARE EDUCATION/TRAINING PROGRAM

## 2024-11-25 PROCEDURE — 67825 REVISE EYELASHES: CPT | Mod: E4,E2 | Performed by: STUDENT IN AN ORGANIZED HEALTH CARE EDUCATION/TRAINING PROGRAM

## 2024-11-25 ASSESSMENT — SUPERFICIAL PUNCTATE KERATITIS (SPK)
OS_SPK: 2+
OD_SPK: 1+

## 2024-11-25 ASSESSMENT — LID EXAM ASSESSMENTS
OD_BLEPHARITIS: RUL 3+
OD_TRICHIASIS: RLL 1+
OS_TRICHIASIS: LLL T
OS_BLEPHARITIS: LUL 3+
OS_COMMENTS: LATERAL RETRACTION MEDIAL RETRACTION LOWER LID POSITIO
OD_COMMENTS: LATERAL RETRACTION MEDIAL RETRACTION LOWER LID POSITIO

## 2024-11-25 ASSESSMENT — REFRACTION_AUTOREFRACTION
OD_CYLINDER: -1.00
OS_AXIS: 089
OS_SPHERE: +0.25
OD_SPHERE: +1.25
OS_CYLINDER: -0.75
OD_AXIS: 086

## 2024-11-25 ASSESSMENT — KERATOMETRY
OD_K1POWER_DIOPTERS: 41.50
METHOD_AUTO_MANUAL: AUTO
OD_K2POWER_DIOPTERS: 42.50
OS_AXISANGLE_DEGREES: 176
OD_AXISANGLE_DEGREES: 089
OS_K1POWER_DIOPTERS: 42.00
OS_K2POWER_DIOPTERS: 42.50

## 2024-11-25 ASSESSMENT — VISUAL ACUITY
OD_BCVA: 20/30-1
OS_BCVA: 20/20

## 2024-11-25 ASSESSMENT — CONFRONTATIONAL VISUAL FIELD TEST (CVF)
OS_FINDINGS: FULL
OD_FINDINGS: FULL

## 2024-12-10 NOTE — ED ADULT NURSE NOTE - PLAN OF CARE DISCUSSED WITH:
[FreeTextEntry1] : discussed in detail treatment for ovarian cyst. LESS Left oophorectomy risks of bleeding, infection, and injury to surrounding organs discussed in detail.  she is to follow up with me for final decision for surgery.  all of her questions were answered she is agreeable with.     I Maryse COUGHLIN am scribing for the presence of Dr. Laguerre the following sections HISTORY OF PRESENT ILLNESS, PAST MEDICAL/FAMILY/SOCIAL HISTORY; REVIEW OF SYSTEMS; VITAL SIGNS; PHYSICAL EXAM; DISPOSITION.    I personally performed the services described in the documentation, reviewed the documentation recorded by the scribe in my presence and it accurately and completely records my words and actions.  
[FreeTextEntry1] : discussed in detail treatment for ovarian cyst. LESS Left oophorectomy risks of bleeding, infection, and injury to surrounding organs discussed in detail.  she is to follow up with me for final decision for surgery.  all of her questions were answered she is agreeable with.     I Maryse COUGHLIN am scribing for the presence of Dr. Laguerre the following sections HISTORY OF PRESENT ILLNESS, PAST MEDICAL/FAMILY/SOCIAL HISTORY; REVIEW OF SYSTEMS; VITAL SIGNS; PHYSICAL EXAM; DISPOSITION.    I personally performed the services described in the documentation, reviewed the documentation recorded by the scribe in my presence and it accurately and completely records my words and actions.  
Patient

## 2025-01-08 PROBLEM — H52.03 HYPEROPIA ; BOTH EYES: Status: ACTIVE | Noted: 2025-01-08

## 2025-01-19 NOTE — ED ADULT NURSE NOTE - AGENT'S NAME
Tamiflu and macrobid as prescribed. Stay well hydrated. Tylenol/motrin for fevers/body aches. Follow-up with PCP if no improvement.   
Kacy Crisostomo  spouse

## 2025-02-02 ENCOUNTER — OFFICE (OUTPATIENT)
Dept: URBAN - METROPOLITAN AREA CLINIC 12 | Facility: CLINIC | Age: 80
Setting detail: OPHTHALMOLOGY
End: 2025-02-02
Payer: MEDICARE

## 2025-02-02 DIAGNOSIS — H43.393: ICD-10-CM

## 2025-02-02 DIAGNOSIS — H16.223: ICD-10-CM

## 2025-02-02 DIAGNOSIS — H35.3131: ICD-10-CM

## 2025-02-02 DIAGNOSIS — H01.001: ICD-10-CM

## 2025-02-02 DIAGNOSIS — H02.015: ICD-10-CM

## 2025-02-02 DIAGNOSIS — H01.004: ICD-10-CM

## 2025-02-02 DIAGNOSIS — Z96.1: ICD-10-CM

## 2025-02-02 DIAGNOSIS — H35.033: ICD-10-CM

## 2025-02-02 DIAGNOSIS — H35.373: ICD-10-CM

## 2025-02-02 DIAGNOSIS — H02.012: ICD-10-CM

## 2025-02-02 DIAGNOSIS — H35.40: ICD-10-CM

## 2025-02-02 PROCEDURE — 92250 FUNDUS PHOTOGRAPHY W/I&R: CPT | Performed by: OPHTHALMOLOGY

## 2025-02-02 PROCEDURE — 92014 COMPRE OPH EXAM EST PT 1/>: CPT | Performed by: OPHTHALMOLOGY

## 2025-02-02 ASSESSMENT — CONFRONTATIONAL VISUAL FIELD TEST (CVF)
OS_FINDINGS: FULL
OD_FINDINGS: FULL

## 2025-02-02 ASSESSMENT — LID EXAM ASSESSMENTS
OS_TRICHIASIS: LLL T
OS_COMMENTS: LATERAL RETRACTION MEDIAL RETRACTION LOWER LID POSITIO
OD_BLEPHARITIS: RUL 3+
OD_COMMENTS: LATERAL RETRACTION MEDIAL RETRACTION LOWER LID POSITIO
OS_BLEPHARITIS: LUL 3+
OD_TRICHIASIS: RLL 1+

## 2025-02-02 ASSESSMENT — TONOMETRY
OS_IOP_MMHG: 13
OD_IOP_MMHG: 14

## 2025-02-02 ASSESSMENT — SUPERFICIAL PUNCTATE KERATITIS (SPK)
OD_SPK: 1+
OS_SPK: 2+

## 2025-02-03 ENCOUNTER — OFFICE (OUTPATIENT)
Dept: URBAN - METROPOLITAN AREA CLINIC 88 | Facility: CLINIC | Age: 80
Setting detail: OPHTHALMOLOGY
End: 2025-02-03
Payer: MEDICARE

## 2025-02-03 DIAGNOSIS — H35.40: ICD-10-CM

## 2025-02-03 DIAGNOSIS — H35.373: ICD-10-CM

## 2025-02-03 DIAGNOSIS — H35.033: ICD-10-CM

## 2025-02-03 DIAGNOSIS — H43.813: ICD-10-CM

## 2025-02-03 DIAGNOSIS — H43.393: ICD-10-CM

## 2025-02-03 DIAGNOSIS — H35.3131: ICD-10-CM

## 2025-02-03 PROCEDURE — 92235 FLUORESCEIN ANGRPH MLTIFRAME: CPT | Performed by: OPHTHALMOLOGY

## 2025-02-03 PROCEDURE — 92134 CPTRZ OPH DX IMG PST SGM RTA: CPT | Performed by: OPHTHALMOLOGY

## 2025-02-03 PROCEDURE — 99214 OFFICE O/P EST MOD 30 MIN: CPT | Performed by: OPHTHALMOLOGY

## 2025-02-03 ASSESSMENT — TONOMETRY
OS_IOP_MMHG: 15
OD_IOP_MMHG: 12

## 2025-02-03 ASSESSMENT — REFRACTION_AUTOREFRACTION
OD_CYLINDER: -0.75
OD_AXIS: 096
OS_AXIS: 082
OD_AXIS: 096
OS_SPHERE: +0.25
OS_CYLINDER: -1.00
OS_CYLINDER: -1.00
OS_SPHERE: +0.25
OD_SPHERE: +0.50
OD_SPHERE: +0.50
OS_AXIS: 082
OD_CYLINDER: -0.75

## 2025-02-03 ASSESSMENT — KERATOMETRY
METHOD_AUTO_MANUAL: AUTO
OS_K2POWER_DIOPTERS: 42.25
OD_K2POWER_DIOPTERS: 41.75
OS_K1POWER_DIOPTERS: 42.25
OS_AXISANGLE_DEGREES: 090
OS_K2POWER_DIOPTERS: 42.25
OS_AXISANGLE_DEGREES: 090
OD_AXISANGLE_DEGREES: 090
METHOD_AUTO_MANUAL: AUTO
OS_K1POWER_DIOPTERS: 42.25
OD_K1POWER_DIOPTERS: 41.75
OD_AXISANGLE_DEGREES: 090
OD_K1POWER_DIOPTERS: 41.75
OD_K2POWER_DIOPTERS: 41.75

## 2025-02-03 ASSESSMENT — CONFRONTATIONAL VISUAL FIELD TEST (CVF)
OD_FINDINGS: FULL
OS_FINDINGS: FULL

## 2025-02-03 ASSESSMENT — SUPERFICIAL PUNCTATE KERATITIS (SPK)
OD_SPK: 1+
OS_SPK: 2+

## 2025-02-03 ASSESSMENT — REFRACTION_MANIFEST
OS_SPHERE: +0.25
OS_CYLINDER: -1.00
OS_AXIS: 080
OD_VA1: 20/NI
OS_VA1: 20/30
OD_SPHERE: +0.50
OD_CYLINDER: -0.75
OD_AXIS: 095

## 2025-02-03 ASSESSMENT — VISUAL ACUITY
OD_BCVA: 20/40-1
OD_BCVA: 20/40-1
OS_BCVA: 20/25-2
OS_BCVA: 20/25-2

## 2025-02-03 ASSESSMENT — LID EXAM ASSESSMENTS
OD_BLEPHARITIS: RUL 3+
OS_BLEPHARITIS: LUL 3+
OS_TRICHIASIS: LLL T
OD_COMMENTS: LATERAL RETRACTION MEDIAL RETRACTION LOWER LID POSITIO
OD_TRICHIASIS: RLL 1+
OS_COMMENTS: LATERAL RETRACTION MEDIAL RETRACTION LOWER LID POSITIO

## 2025-02-08 ENCOUNTER — OFFICE (OUTPATIENT)
Dept: URBAN - METROPOLITAN AREA CLINIC 12 | Facility: CLINIC | Age: 80
Setting detail: OPHTHALMOLOGY
End: 2025-02-08
Payer: MEDICARE

## 2025-02-08 DIAGNOSIS — H16.223: ICD-10-CM

## 2025-02-08 DIAGNOSIS — H02.89: ICD-10-CM

## 2025-02-08 DIAGNOSIS — H02.015: ICD-10-CM

## 2025-02-08 DIAGNOSIS — H02.012: ICD-10-CM

## 2025-02-08 PROCEDURE — 92012 INTRM OPH EXAM EST PATIENT: CPT | Performed by: OPTOMETRIST

## 2025-02-08 ASSESSMENT — KERATOMETRY
OD_K2POWER_DIOPTERS: 41.50
OS_K1POWER_DIOPTERS: 41.75
OS_AXISANGLE_DEGREES: 172
OD_K1POWER_DIOPTERS: 41.50
METHOD_AUTO_MANUAL: AUTO
OD_AXISANGLE_DEGREES: 090
OS_K2POWER_DIOPTERS: 42.50

## 2025-02-08 ASSESSMENT — SUPERFICIAL PUNCTATE KERATITIS (SPK)
OS_SPK: 2+
OD_SPK: 1+

## 2025-02-08 ASSESSMENT — LID EXAM ASSESSMENTS
OS_BLEPHARITIS: LUL 3+
OD_MEIBOMITIS: RUL 3+
OD_BLEPHARITIS: RUL 3+
OD_TRICHIASIS: RLL 2+
OD_COMMENTS: LATERAL RETRACTION MEDIAL RETRACTION LOWER LID POSITIO
OS_MEIBOMITIS: LUL 3+
OS_TRICHIASIS: LLL T
OS_COMMENTS: LATERAL RETRACTION MEDIAL RETRACTION LOWER LID POSITIO

## 2025-02-08 ASSESSMENT — REFRACTION_AUTOREFRACTION
OS_CYLINDER: -1.50
OS_AXIS: 085
OD_AXIS: 099
OD_CYLINDER: -1.00
OD_SPHERE: +1.00
OS_SPHERE: +0.75

## 2025-02-08 ASSESSMENT — VISUAL ACUITY
OS_BCVA: 20/25-
OD_BCVA: 20/40-2

## 2025-02-08 ASSESSMENT — CONFRONTATIONAL VISUAL FIELD TEST (CVF)
OD_FINDINGS: FULL
OS_FINDINGS: FULL

## 2025-02-11 ENCOUNTER — OFFICE (OUTPATIENT)
Dept: URBAN - METROPOLITAN AREA CLINIC 12 | Facility: CLINIC | Age: 80
Setting detail: OPHTHALMOLOGY
End: 2025-02-11
Payer: MEDICARE

## 2025-02-11 DIAGNOSIS — H02.012: ICD-10-CM

## 2025-02-11 DIAGNOSIS — H02.015: ICD-10-CM

## 2025-02-11 DIAGNOSIS — H02.89: ICD-10-CM

## 2025-02-11 DIAGNOSIS — H16.223: ICD-10-CM

## 2025-02-11 DIAGNOSIS — H10.9: ICD-10-CM

## 2025-02-11 PROBLEM — H43.813 POSTERIOR VITREOUS DETACHMENT; BOTH EYES: Status: ACTIVE | Noted: 2025-02-03

## 2025-02-11 PROCEDURE — 92012 INTRM OPH EXAM EST PATIENT: CPT | Performed by: OPHTHALMOLOGY

## 2025-02-11 ASSESSMENT — VISUAL ACUITY
OS_BCVA: 20/25-2
OD_BCVA: 20/40-

## 2025-02-11 ASSESSMENT — SUPERFICIAL PUNCTATE KERATITIS (SPK)
OS_SPK: 2+
OD_SPK: 1+

## 2025-02-11 ASSESSMENT — LID EXAM ASSESSMENTS
OD_COMMENTS: LATERAL RETRACTION MEDIAL RETRACTION LOWER LID POSITIO
OD_TRICHIASIS: RLL 2+
OS_TRICHIASIS: LLL T
OD_MEIBOMITIS: RUL 3+
OS_BLEPHARITIS: LUL 3+
OS_COMMENTS: LATERAL RETRACTION MEDIAL RETRACTION LOWER LID POSITIO
OD_BLEPHARITIS: RUL 3+
OS_MEIBOMITIS: LUL 3+

## 2025-02-11 ASSESSMENT — KERATOMETRY
OD_AXISANGLE_DEGREES: 006
OD_K1POWER_DIOPTERS: 41.25
OD_K2POWER_DIOPTERS: 41.75
OS_K2POWER_DIOPTERS: 42.25
OS_AXISANGLE_DEGREES: 173
OS_K1POWER_DIOPTERS: 41.50
METHOD_AUTO_MANUAL: AUTO

## 2025-02-11 ASSESSMENT — CONFRONTATIONAL VISUAL FIELD TEST (CVF)
OD_FINDINGS: FULL
OS_FINDINGS: FULL

## 2025-02-11 ASSESSMENT — REFRACTION_AUTOREFRACTION
OD_CYLINDER: -0.75
OD_SPHERE: +0.75
OS_CYLINDER: -1.50
OS_AXIS: 079
OS_SPHERE: +1.00
OD_AXIS: 092

## 2025-02-11 ASSESSMENT — TONOMETRY
OS_IOP_MMHG: 16
OD_IOP_MMHG: 15

## 2025-03-20 NOTE — PHYSICAL THERAPY INITIAL EVALUATION ADULT - GAIT DEVIATIONS NOTED, PT EVAL
decreased weight-shifting ability/decreased step length/decreased stride length , Roxana Ross, Jesus Manuel Fair, Bibi Chester, Brianna White, Analia Bell

## 2025-04-14 ENCOUNTER — OFFICE (OUTPATIENT)
Dept: URBAN - METROPOLITAN AREA CLINIC 88 | Facility: CLINIC | Age: 80
Setting detail: OPHTHALMOLOGY
End: 2025-04-14
Payer: MEDICARE

## 2025-04-14 DIAGNOSIS — H43.813: ICD-10-CM

## 2025-04-14 DIAGNOSIS — H35.40: ICD-10-CM

## 2025-04-14 DIAGNOSIS — H35.033: ICD-10-CM

## 2025-04-14 DIAGNOSIS — H43.393: ICD-10-CM

## 2025-04-14 DIAGNOSIS — H35.54: ICD-10-CM

## 2025-04-14 DIAGNOSIS — H35.373: ICD-10-CM

## 2025-04-14 PROCEDURE — 92134 CPTRZ OPH DX IMG PST SGM RTA: CPT | Performed by: OPHTHALMOLOGY

## 2025-04-14 PROCEDURE — 92012 INTRM OPH EXAM EST PATIENT: CPT | Performed by: OPHTHALMOLOGY

## 2025-04-14 ASSESSMENT — LID EXAM ASSESSMENTS
OD_COMMENTS: LATERAL RETRACTION MEDIAL RETRACTION LOWER LID POSITIO
OD_TRICHIASIS: RLL 2+
OS_BLEPHARITIS: LUL 3+
OD_BLEPHARITIS: RUL 3+
OS_COMMENTS: LATERAL RETRACTION MEDIAL RETRACTION LOWER LID POSITIO
OS_TRICHIASIS: LLL T
OS_MEIBOMITIS: LUL 3+
OD_MEIBOMITIS: RUL 3+

## 2025-04-14 ASSESSMENT — VISUAL ACUITY
OS_BCVA: 20/20
OD_BCVA: 20/30

## 2025-04-14 ASSESSMENT — CONFRONTATIONAL VISUAL FIELD TEST (CVF)
OD_FINDINGS: FULL
OS_FINDINGS: FULL

## 2025-04-14 ASSESSMENT — KERATOMETRY
OD_K2POWER_DIOPTERS: 41.75
OS_AXISANGLE_DEGREES: 173
OS_K2POWER_DIOPTERS: 42.25
OS_K1POWER_DIOPTERS: 41.50
METHOD_AUTO_MANUAL: AUTO
OD_AXISANGLE_DEGREES: 006
OD_K1POWER_DIOPTERS: 41.25

## 2025-04-14 ASSESSMENT — REFRACTION_AUTOREFRACTION
OS_AXIS: 079
OS_CYLINDER: -1.50
OS_SPHERE: +1.00
OD_AXIS: 092
OD_CYLINDER: -0.75
OD_SPHERE: +0.75

## 2025-04-14 ASSESSMENT — SUPERFICIAL PUNCTATE KERATITIS (SPK)
OD_SPK: 1+
OS_SPK: 2+

## 2025-04-14 ASSESSMENT — TONOMETRY: OS_IOP_MMHG: 16

## 2025-04-26 ENCOUNTER — OFFICE (OUTPATIENT)
Dept: URBAN - METROPOLITAN AREA CLINIC 12 | Facility: CLINIC | Age: 80
Setting detail: OPHTHALMOLOGY
End: 2025-04-26
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
## 2025-04-26 PROCEDURE — NO SHOW FE NO SHOW FEE: Performed by: OPTOMETRIST

## 2025-05-19 ENCOUNTER — OFFICE (OUTPATIENT)
Dept: URBAN - METROPOLITAN AREA CLINIC 12 | Facility: CLINIC | Age: 80
Setting detail: OPHTHALMOLOGY
End: 2025-05-19
Payer: MEDICARE

## 2025-05-19 DIAGNOSIS — H02.015: ICD-10-CM

## 2025-05-19 DIAGNOSIS — H02.012: ICD-10-CM

## 2025-05-19 PROCEDURE — 67820 REVISE EYELASHES: CPT | Mod: 50 | Performed by: OPTOMETRIST

## 2025-05-19 ASSESSMENT — LID EXAM ASSESSMENTS
OS_TRICHIASIS: LLL T
OD_TRICHIASIS: RLL 2+
OD_MEIBOMITIS: RUL 3+
OD_COMMENTS: LATERAL RETRACTION MEDIAL RETRACTION LOWER LID POSITIO
OS_MEIBOMITIS: LUL 3+
OS_COMMENTS: LATERAL RETRACTION MEDIAL RETRACTION LOWER LID POSITIO
OD_BLEPHARITIS: RUL 3+
OS_BLEPHARITIS: LUL 3+

## 2025-05-19 ASSESSMENT — KERATOMETRY
OD_K2POWER_DIOPTERS: 42.00
OD_AXISANGLE_DEGREES: 007
OS_K2POWER_DIOPTERS: 42.25
METHOD_AUTO_MANUAL: AUTO
OS_K1POWER_DIOPTERS: 41.75
OD_K1POWER_DIOPTERS: 41.75
OS_AXISANGLE_DEGREES: 168

## 2025-05-19 ASSESSMENT — CONFRONTATIONAL VISUAL FIELD TEST (CVF)
OD_FINDINGS: FULL
OS_FINDINGS: FULL

## 2025-05-19 ASSESSMENT — REFRACTION_AUTOREFRACTION
OS_CYLINDER: -0.75
OD_CYLINDER: -0.50
OD_AXIS: 099
OD_SPHERE: +0.50
OS_SPHERE: +0.50
OS_AXIS: 087

## 2025-05-19 ASSESSMENT — TONOMETRY
OD_IOP_MMHG: 14
OS_IOP_MMHG: 15

## 2025-05-19 ASSESSMENT — SUPERFICIAL PUNCTATE KERATITIS (SPK)
OS_SPK: 2+
OD_SPK: 1+

## 2025-05-19 ASSESSMENT — VISUAL ACUITY
OS_BCVA: 20/20-2
OD_BCVA: 20/30+3

## 2025-06-07 ENCOUNTER — OFFICE (OUTPATIENT)
Dept: URBAN - METROPOLITAN AREA CLINIC 12 | Facility: CLINIC | Age: 80
Setting detail: OPHTHALMOLOGY
End: 2025-06-07
Payer: MEDICARE

## 2025-06-07 DIAGNOSIS — H02.89: ICD-10-CM

## 2025-06-07 DIAGNOSIS — H02.012: ICD-10-CM

## 2025-06-07 DIAGNOSIS — H04.123: ICD-10-CM

## 2025-06-07 DIAGNOSIS — H02.015: ICD-10-CM

## 2025-06-07 PROCEDURE — 92012 INTRM OPH EXAM EST PATIENT: CPT | Performed by: OPTOMETRIST

## 2025-06-07 ASSESSMENT — REFRACTION_AUTOREFRACTION
OS_CYLINDER: -0.75
OD_SPHERE: +0.75
OD_CYLINDER: -1.00
OS_AXIS: 085
OD_AXIS: 088
OS_SPHERE: +0.25

## 2025-06-07 ASSESSMENT — KERATOMETRY
METHOD_AUTO_MANUAL: AUTO
OD_AXISANGLE_DEGREES: 090
OD_K1POWER_DIOPTERS: 41.75
OS_K1POWER_DIOPTERS: 41.75
OS_AXISANGLE_DEGREES: 051
OS_K2POWER_DIOPTERS: 42.50
OD_K2POWER_DIOPTERS: 41.75

## 2025-06-07 ASSESSMENT — TONOMETRY
OD_IOP_MMHG: 18
OS_IOP_MMHG: 20

## 2025-06-07 ASSESSMENT — CONFRONTATIONAL VISUAL FIELD TEST (CVF)
OD_FINDINGS: FULL
OS_FINDINGS: FULL

## 2025-06-07 ASSESSMENT — SUPERFICIAL PUNCTATE KERATITIS (SPK)
OD_SPK: 1+
OS_SPK: 2+

## 2025-06-07 ASSESSMENT — LID EXAM ASSESSMENTS
OD_BLEPHARITIS: RUL 3+
OD_COMMENTS: LATERAL RETRACTION MEDIAL RETRACTION LOWER LID POSITIO
OS_COMMENTS: LATERAL RETRACTION MEDIAL RETRACTION LOWER LID POSITIO
OD_TRICHIASIS: RLL 2+
OS_BLEPHARITIS: LUL 3+
OS_MEIBOMITIS: LUL 3+
OD_MEIBOMITIS: RUL 3+
OS_TRICHIASIS: LLL 2+

## 2025-06-07 ASSESSMENT — VISUAL ACUITY
OS_BCVA: 20/25-
OD_BCVA: 20/30+2

## 2025-06-08 ENCOUNTER — RX ONLY (RX ONLY)
Age: 80
End: 2025-06-08

## 2025-06-08 ENCOUNTER — OFFICE (OUTPATIENT)
Dept: URBAN - METROPOLITAN AREA CLINIC 12 | Facility: CLINIC | Age: 80
Setting detail: OPHTHALMOLOGY
End: 2025-06-08
Payer: MEDICARE

## 2025-06-08 DIAGNOSIS — H04.123: ICD-10-CM

## 2025-06-08 DIAGNOSIS — H02.012: ICD-10-CM

## 2025-06-08 DIAGNOSIS — H02.015: ICD-10-CM

## 2025-06-08 PROCEDURE — 67820 REVISE EYELASHES: CPT | Mod: 50 | Performed by: OPTOMETRIST

## 2025-06-08 PROCEDURE — 99213 OFFICE O/P EST LOW 20 MIN: CPT | Mod: 25 | Performed by: OPTOMETRIST

## 2025-06-08 ASSESSMENT — REFRACTION_AUTOREFRACTION
OS_AXIS: 085
OS_CYLINDER: -1.50
OD_SPHERE: +0.75
OD_AXIS: 089
OS_SPHERE: +1.00
OD_CYLINDER: -1.00

## 2025-06-08 ASSESSMENT — SUPERFICIAL PUNCTATE KERATITIS (SPK)
OS_SPK: 2+
OD_SPK: 1+

## 2025-06-08 ASSESSMENT — KERATOMETRY
OD_AXISANGLE_DEGREES: 165
OS_K1POWER_DIOPTERS: 41.50
OD_K2POWER_DIOPTERS: 41.75
OS_K2POWER_DIOPTERS: 42.50
OD_K1POWER_DIOPTERS: 41.50
METHOD_AUTO_MANUAL: AUTO
OS_AXISANGLE_DEGREES: 179

## 2025-06-08 ASSESSMENT — LID EXAM ASSESSMENTS
OS_MEIBOMITIS: LUL 3+
OD_COMMENTS: LATERAL RETRACTION MEDIAL RETRACTION LOWER LID POSITIO
OS_COMMENTS: LATERAL RETRACTION MEDIAL RETRACTION LOWER LID POSITIO
OS_TRICHIASIS: LLL 2+
OD_TRICHIASIS: RLL 2+
OD_BLEPHARITIS: RUL 3+
OS_BLEPHARITIS: LUL 3+
OD_MEIBOMITIS: RUL 3+

## 2025-06-08 ASSESSMENT — TONOMETRY
OS_IOP_MMHG: 21
OD_IOP_MMHG: 21

## 2025-06-08 ASSESSMENT — CONFRONTATIONAL VISUAL FIELD TEST (CVF)
OD_FINDINGS: FULL
OS_FINDINGS: FULL

## 2025-06-08 ASSESSMENT — VISUAL ACUITY
OS_BCVA: 20/20-3
OD_BCVA: 20/50+2

## 2025-06-28 ENCOUNTER — OFFICE (OUTPATIENT)
Dept: URBAN - METROPOLITAN AREA CLINIC 12 | Facility: CLINIC | Age: 80
Setting detail: OPHTHALMOLOGY
End: 2025-06-28
Payer: MEDICARE

## 2025-06-28 DIAGNOSIS — H02.012: ICD-10-CM

## 2025-06-28 DIAGNOSIS — H02.015: ICD-10-CM

## 2025-06-28 PROBLEM — H04.123 DRY EYE SYNDROME LACRIMAL GLAND; BOTH EYES: Status: ACTIVE | Noted: 2025-06-07

## 2025-06-28 PROCEDURE — 99213 OFFICE O/P EST LOW 20 MIN: CPT | Performed by: OPTOMETRIST

## 2025-06-28 PROCEDURE — 67820 REVISE EYELASHES: CPT | Mod: 50 | Performed by: OPTOMETRIST

## 2025-06-28 ASSESSMENT — CONFRONTATIONAL VISUAL FIELD TEST (CVF)
OS_FINDINGS: FULL
OD_FINDINGS: FULL

## 2025-06-28 ASSESSMENT — SUPERFICIAL PUNCTATE KERATITIS (SPK)
OD_SPK: 1+
OS_SPK: 2+

## 2025-06-28 ASSESSMENT — REFRACTION_AUTOREFRACTION
OS_SPHERE: +0.75
OD_CYLINDER: -1.00
OD_SPHERE: +0.75
OS_CYLINDER: -1.25
OD_AXIS: 085
OS_AXIS: 078

## 2025-06-28 ASSESSMENT — LID EXAM ASSESSMENTS
OS_BLEPHARITIS: LUL 3+
OS_TRICHIASIS: LLL 2+
OD_TRICHIASIS: RLL 2+
OD_BLEPHARITIS: RUL 3+
OD_COMMENTS: LATERAL RETRACTION MEDIAL RETRACTION LOWER LID POSITIO
OS_COMMENTS: LATERAL RETRACTION MEDIAL RETRACTION LOWER LID POSITIO
OD_MEIBOMITIS: RUL 3+
OS_MEIBOMITIS: LUL 3+

## 2025-06-28 ASSESSMENT — KERATOMETRY
OS_K2POWER_DIOPTERS: 42.50
OD_AXISANGLE_DEGREES: 162
OS_K1POWER_DIOPTERS: 41.75
OD_K1POWER_DIOPTERS: 41.50
OS_AXISANGLE_DEGREES: 166
METHOD_AUTO_MANUAL: AUTO
OD_K2POWER_DIOPTERS: 41.75

## 2025-06-28 ASSESSMENT — VISUAL ACUITY
OS_BCVA: 20/25-
OD_BCVA: 20/30-2

## 2025-07-09 ENCOUNTER — NON-APPOINTMENT (OUTPATIENT)
Age: 80
End: 2025-07-09

## 2025-07-11 ENCOUNTER — APPOINTMENT (OUTPATIENT)
Dept: PULMONOLOGY | Facility: CLINIC | Age: 80
End: 2025-07-11

## 2025-07-11 VITALS
RESPIRATION RATE: 16 BRPM | OXYGEN SATURATION: 95 % | HEART RATE: 75 BPM | SYSTOLIC BLOOD PRESSURE: 126 MMHG | DIASTOLIC BLOOD PRESSURE: 78 MMHG

## 2025-07-11 VITALS — WEIGHT: 220 LBS | BODY MASS INDEX: 34.53 KG/M2 | HEIGHT: 67 IN

## 2025-07-11 PROBLEM — R06.02 SOBOE (SHORTNESS OF BREATH ON EXERTION): Status: ACTIVE | Noted: 2025-07-11

## 2025-07-11 PROCEDURE — 99215 OFFICE O/P EST HI 40 MIN: CPT

## 2025-07-11 PROCEDURE — G2211 COMPLEX E/M VISIT ADD ON: CPT

## 2025-07-11 RX ORDER — FEXOFENADINE HCL 60 MG
CAPSULE ORAL
Refills: 0 | Status: ACTIVE | COMMUNITY

## 2025-07-13 ENCOUNTER — OUTPATIENT (OUTPATIENT)
Dept: OUTPATIENT SERVICES | Facility: HOSPITAL | Age: 80
LOS: 1 days | End: 2025-07-13
Payer: MEDICARE

## 2025-07-13 DIAGNOSIS — R91.8 OTHER NONSPECIFIC ABNORMAL FINDING OF LUNG FIELD: ICD-10-CM

## 2025-07-13 DIAGNOSIS — Z98.890 OTHER SPECIFIED POSTPROCEDURAL STATES: Chronic | ICD-10-CM

## 2025-07-13 DIAGNOSIS — Z98.49 CATARACT EXTRACTION STATUS, UNSPECIFIED EYE: Chronic | ICD-10-CM

## 2025-07-13 DIAGNOSIS — M50.20 OTHER CERVICAL DISC DISPLACEMENT, UNSPECIFIED CERVICAL REGION: Chronic | ICD-10-CM

## 2025-07-13 DIAGNOSIS — Z86.16 PERSONAL HISTORY OF COVID-19: ICD-10-CM

## 2025-07-13 PROCEDURE — 71250 CT THORAX DX C-: CPT

## 2025-07-22 ENCOUNTER — NON-APPOINTMENT (OUTPATIENT)
Age: 80
End: 2025-07-22

## 2025-08-17 ENCOUNTER — OFFICE (OUTPATIENT)
Dept: URBAN - METROPOLITAN AREA CLINIC 100 | Facility: CLINIC | Age: 80
Setting detail: OPHTHALMOLOGY
End: 2025-08-17
Payer: MEDICARE

## 2025-08-17 DIAGNOSIS — H02.012: ICD-10-CM

## 2025-08-17 DIAGNOSIS — H02.015: ICD-10-CM

## 2025-08-17 PROCEDURE — 67820 REVISE EYELASHES: CPT | Mod: 50 | Performed by: STUDENT IN AN ORGANIZED HEALTH CARE EDUCATION/TRAINING PROGRAM

## 2025-08-17 ASSESSMENT — KERATOMETRY
OD_AXISANGLE_DEGREES: 119
METHOD_AUTO_MANUAL: AUTO
OD_K1POWER_DIOPTERS: 41.75
OS_K2POWER_DIOPTERS: 42.25
OD_K2POWER_DIOPTERS: 42.00
OS_AXISANGLE_DEGREES: 177
OS_K1POWER_DIOPTERS: 41.50

## 2025-08-17 ASSESSMENT — LID EXAM ASSESSMENTS
OD_TRICHIASIS: RLL 2+
OD_BLEPHARITIS: RUL 3+
OS_BLEPHARITIS: LUL 3+
OS_COMMENTS: LATERAL RETRACTION MEDIAL RETRACTION LOWER LID POSITIO
OD_COMMENTS: LATERAL RETRACTION MEDIAL RETRACTION LOWER LID POSITIO
OS_MEIBOMITIS: LUL 3+
OD_MEIBOMITIS: RUL 3+
OS_TRICHIASIS: LLL 2+

## 2025-08-17 ASSESSMENT — REFRACTION_AUTOREFRACTION
OS_AXIS: 090
OS_SPHERE: +0.25
OD_SPHERE: PLANO
OD_CYLINDER: -0.50
OS_CYLINDER: -0.75
OD_AXIS: 088

## 2025-08-17 ASSESSMENT — CONFRONTATIONAL VISUAL FIELD TEST (CVF)
OS_FINDINGS: FULL
OD_FINDINGS: FULL

## 2025-08-17 ASSESSMENT — SUPERFICIAL PUNCTATE KERATITIS (SPK)
OS_SPK: 2+
OD_SPK: 1+

## 2025-08-17 ASSESSMENT — VISUAL ACUITY
OD_BCVA: 20/40-
OS_BCVA: 20/25-3

## 2025-08-23 ENCOUNTER — OFFICE (OUTPATIENT)
Dept: URBAN - METROPOLITAN AREA CLINIC 12 | Facility: CLINIC | Age: 80
Setting detail: OPHTHALMOLOGY
End: 2025-08-23
Payer: MEDICARE

## 2025-08-23 DIAGNOSIS — H11.32: ICD-10-CM

## 2025-08-23 PROCEDURE — 92012 INTRM OPH EXAM EST PATIENT: CPT | Performed by: OPHTHALMOLOGY

## 2025-08-23 ASSESSMENT — LID EXAM ASSESSMENTS
OS_BLEPHARITIS: LUL 3+
OD_COMMENTS: LATERAL RETRACTION MEDIAL RETRACTION LOWER LID POSITIO
OS_TRICHIASIS: LLL 2+
OD_TRICHIASIS: RLL 2+
OD_BLEPHARITIS: RUL 3+
OS_MEIBOMITIS: LUL 3+
OD_MEIBOMITIS: RUL 3+
OS_COMMENTS: LATERAL RETRACTION MEDIAL RETRACTION LOWER LID POSITIO

## 2025-08-23 ASSESSMENT — KERATOMETRY
OS_K2POWER_DIOPTERS: 42.50
OD_K1POWER_DIOPTERS: 41.75
METHOD_AUTO_MANUAL: AUTO
OS_AXISANGLE_DEGREES: 175
OD_AXISANGLE_DEGREES: 090
OD_K2POWER_DIOPTERS: 41.75
OS_K1POWER_DIOPTERS: 42.00

## 2025-08-23 ASSESSMENT — REFRACTION_AUTOREFRACTION
OS_CYLINDER: -1.00
OD_AXIS: 090
OS_SPHERE: +0.75
OS_AXIS: 087
OD_CYLINDER: -1.00
OD_SPHERE: +0.75

## 2025-08-23 ASSESSMENT — SUPERFICIAL PUNCTATE KERATITIS (SPK)
OD_SPK: 1+
OS_SPK: 2+

## 2025-08-23 ASSESSMENT — VISUAL ACUITY
OD_BCVA: 20/30
OS_BCVA: 20/20-2

## 2025-08-23 ASSESSMENT — CONFRONTATIONAL VISUAL FIELD TEST (CVF)
OD_FINDINGS: FULL
OS_FINDINGS: FULL

## 2025-08-23 ASSESSMENT — TONOMETRY
OD_IOP_MMHG: 13
OS_IOP_MMHG: 12